# Patient Record
Sex: FEMALE | Race: WHITE | NOT HISPANIC OR LATINO | Employment: UNEMPLOYED | URBAN - METROPOLITAN AREA
[De-identification: names, ages, dates, MRNs, and addresses within clinical notes are randomized per-mention and may not be internally consistent; named-entity substitution may affect disease eponyms.]

---

## 2018-03-15 ENCOUNTER — HOSPITAL ENCOUNTER (OUTPATIENT)
Dept: MAMMOGRAPHY | Facility: HOSPITAL | Age: 58
Discharge: HOME | End: 2018-03-15
Attending: OBSTETRICS & GYNECOLOGY | Admitting: OBSTETRICS & GYNECOLOGY

## 2018-12-04 ENCOUNTER — HOSPITAL ENCOUNTER (OUTPATIENT)
Dept: LAB | Facility: HOSPITAL | Age: 58
Discharge: HOME | End: 2018-12-04
Attending: OTOLARYNGOLOGY | Admitting: OTOLARYNGOLOGY

## 2018-12-06 LAB — Lab: NORMAL

## 2019-08-11 RX ORDER — CLONAZEPAM 1 MG/1
TABLET ORAL
Qty: 90 TABLET | Refills: 0 | OUTPATIENT
Start: 2019-08-11

## 2019-08-17 RX ORDER — CLONAZEPAM 1 MG/1
TABLET ORAL
Qty: 90 TABLET | Refills: 0 | Status: SHIPPED | OUTPATIENT
Start: 2019-08-17 | End: 2019-09-23 | Stop reason: ALTCHOICE

## 2019-09-06 ENCOUNTER — TELEPHONE (OUTPATIENT)
Dept: PSYCHIATRY | Facility: CLINIC | Age: 59
End: 2019-09-06

## 2019-09-06 RX ORDER — HYDROXYZINE PAMOATE 25 MG/1
25 CAPSULE ORAL 3 TIMES DAILY PRN
Qty: 90 CAPSULE | Refills: 0 | Status: SHIPPED | OUTPATIENT
Start: 2019-09-06

## 2019-09-12 ENCOUNTER — TELEPHONE (OUTPATIENT)
Dept: PSYCHIATRY | Facility: CLINIC | Age: 59
End: 2019-09-12

## 2019-09-12 DIAGNOSIS — F41.1 GENERALIZED ANXIETY DISORDER: Primary | ICD-10-CM

## 2019-09-17 ENCOUNTER — TELEPHONE (OUTPATIENT)
Dept: PSYCHIATRY | Facility: CLINIC | Age: 59
End: 2019-09-17

## 2019-09-17 RX ORDER — BLOOD-GLUCOSE METER
KIT MISCELLANEOUS
Refills: 7 | COMMUNITY
Start: 2019-09-05

## 2019-09-17 RX ORDER — ATORVASTATIN CALCIUM 80 MG/1
TABLET, FILM COATED ORAL
Refills: 4 | COMMUNITY
Start: 2019-07-02

## 2019-09-17 RX ORDER — METFORMIN HYDROCHLORIDE 500 MG/1
TABLET, EXTENDED RELEASE ORAL
Refills: 4 | COMMUNITY
Start: 2019-07-29

## 2019-09-17 RX ORDER — AZELASTINE 1 MG/ML
SPRAY, METERED NASAL
Refills: 6 | COMMUNITY
Start: 2019-08-31

## 2019-09-17 RX ORDER — GLIPIZIDE 10 MG/1
10 TABLET ORAL 2 TIMES DAILY
Refills: 3 | COMMUNITY
Start: 2019-07-11

## 2019-09-17 RX ORDER — FENOFIBRATE 48 MG/1
TABLET, COATED ORAL
Refills: 2 | COMMUNITY
Start: 2019-07-01

## 2019-09-17 RX ORDER — CETIRIZINE HYDROCHLORIDE 10 MG/1
TABLET ORAL
Refills: 6 | COMMUNITY
Start: 2019-09-05

## 2019-09-17 RX ORDER — PANTOPRAZOLE SODIUM 40 MG/1
TABLET, DELAYED RELEASE ORAL
Refills: 4 | COMMUNITY
Start: 2019-07-01

## 2019-09-17 RX ORDER — MONTELUKAST SODIUM 10 MG/1
10 TABLET ORAL DAILY
Refills: 11 | COMMUNITY
Start: 2019-07-03

## 2019-09-17 RX ORDER — DOCUSATE SODIUM 100 MG/1
CAPSULE, LIQUID FILLED ORAL
Refills: 2 | COMMUNITY
Start: 2019-07-01

## 2019-09-17 RX ORDER — FLUTICASONE PROPIONATE 50 MCG
SPRAY, SUSPENSION (ML) NASAL
Refills: 6 | COMMUNITY
Start: 2019-08-01

## 2019-09-17 RX ORDER — DICYCLOMINE HYDROCHLORIDE 10 MG/1
CAPSULE ORAL
Refills: 2 | COMMUNITY
Start: 2019-08-28

## 2019-09-23 RX ORDER — ALPRAZOLAM 1 MG/1
1 TABLET ORAL 3 TIMES DAILY PRN
Qty: 21 TABLET | Refills: 0 | Status: SHIPPED | OUTPATIENT
Start: 2019-09-23 | End: 2019-09-26 | Stop reason: SDUPTHER

## 2019-09-24 ENCOUNTER — OFFICE VISIT (OUTPATIENT)
Dept: PSYCHIATRY | Facility: CLINIC | Age: 59
End: 2019-09-24

## 2019-09-24 DIAGNOSIS — F51.05 INSOMNIA DUE TO MENTAL DISORDER: ICD-10-CM

## 2019-09-24 DIAGNOSIS — F41.1 GENERALIZED ANXIETY DISORDER: ICD-10-CM

## 2019-09-24 DIAGNOSIS — F31.60 BIPOLAR AFFECTIVE DISORDER, MIXED (HCC): Primary | ICD-10-CM

## 2019-09-24 PROCEDURE — 99213 OFFICE O/P EST LOW 20 MIN: CPT | Performed by: PHYSICIAN ASSISTANT

## 2019-09-24 RX ORDER — PAROXETINE HYDROCHLORIDE HEMIHYDRATE 37.5 MG/1
37.5 TABLET, FILM COATED, EXTENDED RELEASE ORAL EVERY MORNING
Qty: 90 TABLET | Refills: 1 | Status: SHIPPED | OUTPATIENT
Start: 2019-09-24 | End: 2020-01-27

## 2019-09-24 RX ORDER — AMITRIPTYLINE HYDROCHLORIDE 25 MG/1
25 TABLET, FILM COATED ORAL NIGHTLY
Qty: 90 TABLET | Refills: 1 | Status: SHIPPED | OUTPATIENT
Start: 2019-09-24 | End: 2020-03-19 | Stop reason: SDUPTHER

## 2019-09-24 RX ORDER — ZIPRASIDONE HYDROCHLORIDE 60 MG/1
CAPSULE ORAL
Qty: 90 CAPSULE | Refills: 1 | Status: SHIPPED | OUTPATIENT
Start: 2019-09-24 | End: 2020-03-19 | Stop reason: SDUPTHER

## 2019-09-26 ENCOUNTER — TELEPHONE (OUTPATIENT)
Dept: PSYCHIATRY | Facility: CLINIC | Age: 59
End: 2019-09-26

## 2019-09-26 DIAGNOSIS — F41.1 GENERALIZED ANXIETY DISORDER: ICD-10-CM

## 2019-09-27 RX ORDER — ALPRAZOLAM 1 MG/1
TABLET ORAL
Qty: 90 TABLET | Refills: 2 | Status: SHIPPED | OUTPATIENT
Start: 2019-09-27 | End: 2019-12-16 | Stop reason: SDUPTHER

## 2019-12-16 DIAGNOSIS — F41.1 GENERALIZED ANXIETY DISORDER: ICD-10-CM

## 2019-12-16 RX ORDER — ALPRAZOLAM 1 MG/1
TABLET ORAL
Qty: 90 TABLET | Refills: 2 | Status: SHIPPED | OUTPATIENT
Start: 2019-12-16 | End: 2020-03-10

## 2020-01-27 DIAGNOSIS — F41.1 GENERALIZED ANXIETY DISORDER: ICD-10-CM

## 2020-01-27 DIAGNOSIS — F31.60 BIPOLAR AFFECTIVE DISORDER, MIXED (HCC): ICD-10-CM

## 2020-01-27 RX ORDER — PAROXETINE HYDROCHLORIDE HEMIHYDRATE 37.5 MG/1
37.5 TABLET, FILM COATED, EXTENDED RELEASE ORAL EVERY MORNING
Qty: 90 TABLET | Refills: 0 | Status: SHIPPED | OUTPATIENT
Start: 2020-01-27 | End: 2020-03-19 | Stop reason: SDUPTHER

## 2020-03-10 DIAGNOSIS — F41.1 GENERALIZED ANXIETY DISORDER: ICD-10-CM

## 2020-03-10 RX ORDER — ALPRAZOLAM 1 MG/1
TABLET ORAL
Qty: 90 TABLET | Refills: 0 | Status: SHIPPED | OUTPATIENT
Start: 2020-03-10 | End: 2020-03-19 | Stop reason: SDUPTHER

## 2020-03-19 ENCOUNTER — OFFICE VISIT (OUTPATIENT)
Dept: PSYCHIATRY | Facility: CLINIC | Age: 60
End: 2020-03-19

## 2020-03-19 DIAGNOSIS — F31.60 BIPOLAR AFFECTIVE DISORDER, MIXED (HCC): Primary | ICD-10-CM

## 2020-03-19 DIAGNOSIS — F51.05 INSOMNIA DUE TO MENTAL DISORDER: ICD-10-CM

## 2020-03-19 DIAGNOSIS — F41.1 GENERALIZED ANXIETY DISORDER: ICD-10-CM

## 2020-03-19 PROCEDURE — 99213 OFFICE O/P EST LOW 20 MIN: CPT | Performed by: PHYSICIAN ASSISTANT

## 2020-03-19 RX ORDER — PAROXETINE HYDROCHLORIDE HEMIHYDRATE 37.5 MG/1
37.5 TABLET, FILM COATED, EXTENDED RELEASE ORAL EVERY MORNING
Qty: 90 TABLET | Refills: 1 | Status: SHIPPED | OUTPATIENT
Start: 2020-03-19 | End: 2020-10-05

## 2020-03-19 RX ORDER — AMITRIPTYLINE HYDROCHLORIDE 25 MG/1
25 TABLET, FILM COATED ORAL NIGHTLY
Qty: 90 TABLET | Refills: 1 | Status: SHIPPED | OUTPATIENT
Start: 2020-03-19 | End: 2020-08-31

## 2020-03-19 RX ORDER — ZIPRASIDONE HYDROCHLORIDE 60 MG/1
CAPSULE ORAL
Qty: 90 CAPSULE | Refills: 1 | Status: SHIPPED | OUTPATIENT
Start: 2020-03-19 | End: 2020-08-03

## 2020-03-19 RX ORDER — ALPRAZOLAM 1 MG/1
1 TABLET ORAL 3 TIMES DAILY PRN
Qty: 90 TABLET | Refills: 2 | Status: SHIPPED | OUTPATIENT
Start: 2020-03-19 | End: 2020-06-25

## 2020-03-21 DIAGNOSIS — F31.60 BIPOLAR AFFECTIVE DISORDER, MIXED (HCC): ICD-10-CM

## 2020-03-21 RX ORDER — ZIPRASIDONE HYDROCHLORIDE 60 MG/1
CAPSULE ORAL
Qty: 90 CAPSULE | Refills: 1 | OUTPATIENT
Start: 2020-03-21

## 2020-03-23 DIAGNOSIS — F51.05 INSOMNIA DUE TO MENTAL DISORDER: ICD-10-CM

## 2020-03-23 RX ORDER — AMITRIPTYLINE HYDROCHLORIDE 25 MG/1
25 TABLET, FILM COATED ORAL NIGHTLY
Qty: 90 TABLET | Refills: 1 | OUTPATIENT
Start: 2020-03-23

## 2020-06-01 ENCOUNTER — TELEPHONE (OUTPATIENT)
Dept: PSYCHIATRY | Facility: CLINIC | Age: 60
End: 2020-06-01

## 2020-06-25 DIAGNOSIS — F41.1 GENERALIZED ANXIETY DISORDER: ICD-10-CM

## 2020-06-25 RX ORDER — ALPRAZOLAM 1 MG/1
1 TABLET ORAL 3 TIMES DAILY PRN
Qty: 90 TABLET | Refills: 2 | Status: SHIPPED | OUTPATIENT
Start: 2020-06-25 | End: 2020-09-22

## 2020-08-01 DIAGNOSIS — F31.60 BIPOLAR AFFECTIVE DISORDER, MIXED (HCC): ICD-10-CM

## 2020-08-03 RX ORDER — ZIPRASIDONE HYDROCHLORIDE 60 MG/1
CAPSULE ORAL
Qty: 90 CAPSULE | Refills: 1 | Status: SHIPPED | OUTPATIENT
Start: 2020-08-03 | End: 2020-09-24 | Stop reason: ALTCHOICE

## 2020-08-31 DIAGNOSIS — F51.05 INSOMNIA DUE TO MENTAL DISORDER: ICD-10-CM

## 2020-08-31 RX ORDER — AMITRIPTYLINE HYDROCHLORIDE 25 MG/1
TABLET, FILM COATED ORAL
Qty: 90 TABLET | Refills: 0 | Status: SHIPPED | OUTPATIENT
Start: 2020-08-31 | End: 2021-01-02

## 2020-09-03 ENCOUNTER — TELEPHONE (OUTPATIENT)
Dept: PSYCHIATRY | Facility: CLINIC | Age: 60
End: 2020-09-03

## 2020-09-05 DIAGNOSIS — F41.1 GENERALIZED ANXIETY DISORDER: ICD-10-CM

## 2020-09-10 RX ORDER — ALPRAZOLAM 1 MG/1
1 TABLET ORAL 3 TIMES DAILY PRN
Qty: 90 TABLET | Refills: 2 | OUTPATIENT
Start: 2020-09-10

## 2020-09-14 ENCOUNTER — TELEPHONE (OUTPATIENT)
Dept: PSYCHIATRY | Facility: CLINIC | Age: 60
End: 2020-09-14

## 2020-09-22 DIAGNOSIS — F41.1 GENERALIZED ANXIETY DISORDER: ICD-10-CM

## 2020-09-22 RX ORDER — ALPRAZOLAM 1 MG/1
1 TABLET ORAL 3 TIMES DAILY PRN
Qty: 90 TABLET | Refills: 0 | Status: SHIPPED | OUTPATIENT
Start: 2020-09-22 | End: 2020-10-27 | Stop reason: SDUPTHER

## 2020-09-24 ENCOUNTER — TELEPHONE (OUTPATIENT)
Dept: PSYCHIATRY | Facility: CLINIC | Age: 60
End: 2020-09-24

## 2020-09-24 DIAGNOSIS — F31.60 BIPOLAR AFFECTIVE DISORDER, MIXED (HCC): Primary | ICD-10-CM

## 2020-10-04 DIAGNOSIS — F41.1 GENERALIZED ANXIETY DISORDER: ICD-10-CM

## 2020-10-04 DIAGNOSIS — F31.60 BIPOLAR AFFECTIVE DISORDER, MIXED (HCC): ICD-10-CM

## 2020-10-05 RX ORDER — PAROXETINE HYDROCHLORIDE HEMIHYDRATE 37.5 MG/1
TABLET, FILM COATED, EXTENDED RELEASE ORAL
Qty: 90 TABLET | Refills: 0 | Status: SHIPPED | OUTPATIENT
Start: 2020-10-05 | End: 2020-12-31

## 2020-10-27 ENCOUNTER — OFFICE VISIT (OUTPATIENT)
Dept: PSYCHIATRY | Facility: CLINIC | Age: 60
End: 2020-10-27

## 2020-10-27 DIAGNOSIS — F41.1 GENERALIZED ANXIETY DISORDER: ICD-10-CM

## 2020-10-27 DIAGNOSIS — F51.05 INSOMNIA DUE TO MENTAL DISORDER: ICD-10-CM

## 2020-10-27 DIAGNOSIS — F31.60 BIPOLAR AFFECTIVE DISORDER, MIXED (HCC): Primary | ICD-10-CM

## 2020-10-27 PROCEDURE — 99442 PR PHYS/QHP TELEPHONE EVALUATION 11-20 MIN: CPT | Performed by: PHYSICIAN ASSISTANT

## 2020-10-27 RX ORDER — ALPRAZOLAM 1 MG/1
1 TABLET ORAL 3 TIMES DAILY PRN
Qty: 90 TABLET | Refills: 2 | Status: SHIPPED | OUTPATIENT
Start: 2020-10-27 | End: 2021-01-09

## 2020-12-05 DIAGNOSIS — F31.60 BIPOLAR AFFECTIVE DISORDER, MIXED (HCC): ICD-10-CM

## 2020-12-07 RX ORDER — CARIPRAZINE 1.5 MG/1
CAPSULE, GELATIN COATED ORAL
Qty: 30 CAPSULE | Refills: 5 | Status: SHIPPED | OUTPATIENT
Start: 2020-12-07 | End: 2021-01-27 | Stop reason: DRUGHIGH

## 2020-12-31 DIAGNOSIS — F31.60 BIPOLAR AFFECTIVE DISORDER, MIXED (HCC): ICD-10-CM

## 2020-12-31 DIAGNOSIS — F41.1 GENERALIZED ANXIETY DISORDER: ICD-10-CM

## 2020-12-31 RX ORDER — PAROXETINE HYDROCHLORIDE HEMIHYDRATE 37.5 MG/1
TABLET, FILM COATED, EXTENDED RELEASE ORAL
Qty: 90 TABLET | Refills: 1 | Status: SHIPPED | OUTPATIENT
Start: 2020-12-31 | End: 2021-01-27

## 2021-01-02 DIAGNOSIS — F51.05 INSOMNIA DUE TO MENTAL DISORDER: ICD-10-CM

## 2021-01-02 RX ORDER — AMITRIPTYLINE HYDROCHLORIDE 25 MG/1
TABLET, FILM COATED ORAL
Qty: 90 TABLET | Refills: 1 | Status: SHIPPED | OUTPATIENT
Start: 2021-01-02 | End: 2021-04-26

## 2021-01-07 DIAGNOSIS — F41.1 GENERALIZED ANXIETY DISORDER: ICD-10-CM

## 2021-01-09 RX ORDER — ALPRAZOLAM 1 MG/1
TABLET ORAL
Qty: 90 TABLET | Refills: 2 | Status: SHIPPED | OUTPATIENT
Start: 2021-01-09 | End: 2021-04-26 | Stop reason: SDUPTHER

## 2021-01-13 ENCOUNTER — TELEPHONE (OUTPATIENT)
Dept: PSYCHIATRY | Facility: CLINIC | Age: 61
End: 2021-01-13

## 2021-01-18 ENCOUNTER — TELEPHONE (OUTPATIENT)
Dept: PSYCHIATRY | Facility: CLINIC | Age: 61
End: 2021-01-18

## 2021-01-26 ENCOUNTER — TELEPHONE (OUTPATIENT)
Dept: PSYCHIATRY | Facility: CLINIC | Age: 61
End: 2021-01-26

## 2021-01-26 DIAGNOSIS — F31.60 BIPOLAR AFFECTIVE DISORDER, MIXED (HCC): Primary | ICD-10-CM

## 2021-04-22 ENCOUNTER — TELEPHONE (OUTPATIENT)
Dept: PSYCHIATRY | Facility: CLINIC | Age: 61
End: 2021-04-22

## 2021-04-22 DIAGNOSIS — F41.1 GENERALIZED ANXIETY DISORDER: ICD-10-CM

## 2021-04-26 DIAGNOSIS — F51.05 INSOMNIA DUE TO MENTAL DISORDER: ICD-10-CM

## 2021-04-26 RX ORDER — AMITRIPTYLINE HYDROCHLORIDE 25 MG/1
TABLET, FILM COATED ORAL
Qty: 90 TABLET | Refills: 1 | Status: SHIPPED | OUTPATIENT
Start: 2021-04-26

## 2021-04-26 RX ORDER — ALPRAZOLAM 1 MG/1
1 TABLET ORAL 3 TIMES DAILY PRN
Qty: 90 TABLET | Refills: 0 | Status: SHIPPED | OUTPATIENT
Start: 2021-04-26

## 2021-05-11 ENCOUNTER — TELEPHONE (OUTPATIENT)
Dept: PSYCHIATRY | Facility: CLINIC | Age: 61
End: 2021-05-11

## 2025-06-10 ENCOUNTER — HOSPITAL ENCOUNTER (INPATIENT)
Facility: HOSPITAL | Age: 65
LOS: 1 days | Discharge: NURSING, REHAB OR LONG-TERM CARE | DRG: 683 | End: 2025-06-12
Attending: EMERGENCY MEDICINE | Admitting: INTERNAL MEDICINE
Payer: MEDICARE

## 2025-06-10 DIAGNOSIS — E83.42 HYPOMAGNESEMIA: ICD-10-CM

## 2025-06-10 DIAGNOSIS — R53.1 WEAKNESS: Primary | ICD-10-CM

## 2025-06-10 DIAGNOSIS — E86.0 DEHYDRATION: ICD-10-CM

## 2025-06-10 LAB
ALBUMIN SERPL-MCNC: 3.8 G/DL (ref 3.5–5.2)
ALBUMIN/GLOB SERPL: 1.9 G/DL
ALP SERPL-CCNC: 84 U/L (ref 39–117)
ALT SERPL W P-5'-P-CCNC: 13 U/L (ref 1–33)
ANION GAP SERPL CALCULATED.3IONS-SCNC: 13.2 MMOL/L (ref 5–15)
AST SERPL-CCNC: 14 U/L (ref 1–32)
BACTERIA UR QL AUTO: NORMAL /HPF
BASOPHILS # BLD AUTO: 0.03 10*3/MM3 (ref 0–0.2)
BASOPHILS NFR BLD AUTO: 0.4 % (ref 0–1.5)
BILIRUB SERPL-MCNC: 0.2 MG/DL (ref 0–1.2)
BILIRUB UR QL STRIP: NEGATIVE
BUN SERPL-MCNC: 21.5 MG/DL (ref 8–23)
BUN/CREAT SERPL: 15.9 (ref 7–25)
CALCIUM SPEC-SCNC: 9.7 MG/DL (ref 8.6–10.5)
CHLORIDE SERPL-SCNC: 95 MMOL/L (ref 98–107)
CLARITY UR: CLEAR
CO2 SERPL-SCNC: 24.8 MMOL/L (ref 22–29)
COLOR UR: YELLOW
CREAT SERPL-MCNC: 1.35 MG/DL (ref 0.57–1)
DEPRECATED RDW RBC AUTO: 50.1 FL (ref 37–54)
EGFRCR SERPLBLD CKD-EPI 2021: 43.2 ML/MIN/1.73
EOSINOPHIL # BLD AUTO: 0.36 10*3/MM3 (ref 0–0.4)
EOSINOPHIL NFR BLD AUTO: 5.2 % (ref 0.3–6.2)
ERYTHROCYTE [DISTWIDTH] IN BLOOD BY AUTOMATED COUNT: 15.8 % (ref 12.3–15.4)
GLOBULIN UR ELPH-MCNC: 2 GM/DL
GLUCOSE BLDC GLUCOMTR-MCNC: 209 MG/DL (ref 70–105)
GLUCOSE SERPL-MCNC: 183 MG/DL (ref 65–99)
GLUCOSE UR STRIP-MCNC: NEGATIVE MG/DL
GRAN CASTS URNS QL MICRO: NORMAL /LPF
HCT VFR BLD AUTO: 28.5 % (ref 34–46.6)
HGB BLD-MCNC: 9.1 G/DL (ref 12–15.9)
HGB UR QL STRIP.AUTO: NEGATIVE
HYALINE CASTS UR QL AUTO: NORMAL /LPF
IMM GRANULOCYTES # BLD AUTO: 0.03 10*3/MM3 (ref 0–0.05)
IMM GRANULOCYTES NFR BLD AUTO: 0.4 % (ref 0–0.5)
KETONES UR QL STRIP: ABNORMAL
LEUKOCYTE ESTERASE UR QL STRIP.AUTO: ABNORMAL
LYMPHOCYTES # BLD AUTO: 1.6 10*3/MM3 (ref 0.7–3.1)
LYMPHOCYTES NFR BLD AUTO: 23.3 % (ref 19.6–45.3)
MAGNESIUM SERPL-MCNC: 1.3 MG/DL (ref 1.6–2.4)
MCH RBC QN AUTO: 27.7 PG (ref 26.6–33)
MCHC RBC AUTO-ENTMCNC: 31.9 G/DL (ref 31.5–35.7)
MCV RBC AUTO: 86.6 FL (ref 79–97)
MONOCYTES # BLD AUTO: 0.6 10*3/MM3 (ref 0.1–0.9)
MONOCYTES NFR BLD AUTO: 8.7 % (ref 5–12)
NEUTROPHILS NFR BLD AUTO: 4.24 10*3/MM3 (ref 1.7–7)
NEUTROPHILS NFR BLD AUTO: 62 % (ref 42.7–76)
NITRITE UR QL STRIP: NEGATIVE
NRBC BLD AUTO-RTO: 0 /100 WBC (ref 0–0.2)
NT-PROBNP SERPL-MCNC: 567 PG/ML (ref 0–900)
PH UR STRIP.AUTO: 5.5 [PH] (ref 5–8)
PLATELET # BLD AUTO: 388 10*3/MM3 (ref 140–450)
PMV BLD AUTO: 8.9 FL (ref 6–12)
POTASSIUM SERPL-SCNC: 4.6 MMOL/L (ref 3.5–5.2)
PROT SERPL-MCNC: 5.8 G/DL (ref 6–8.5)
PROT UR QL STRIP: ABNORMAL
RBC # BLD AUTO: 3.29 10*6/MM3 (ref 3.77–5.28)
RBC # UR STRIP: NORMAL /HPF
REF LAB TEST METHOD: NORMAL
SODIUM SERPL-SCNC: 133 MMOL/L (ref 136–145)
SP GR UR STRIP: 1.01 (ref 1–1.03)
SQUAMOUS #/AREA URNS HPF: NORMAL /HPF
TSH SERPL DL<=0.05 MIU/L-ACNC: 1.26 UIU/ML (ref 0.27–4.2)
UROBILINOGEN UR QL STRIP: ABNORMAL
WBC # UR STRIP: NORMAL /HPF
WBC NRBC COR # BLD AUTO: 6.86 10*3/MM3 (ref 3.4–10.8)

## 2025-06-10 PROCEDURE — 25810000003 SODIUM CHLORIDE 0.9 % SOLUTION

## 2025-06-10 PROCEDURE — 84443 ASSAY THYROID STIM HORMONE: CPT | Performed by: EMERGENCY MEDICINE

## 2025-06-10 PROCEDURE — 99285 EMERGENCY DEPT VISIT HI MDM: CPT

## 2025-06-10 PROCEDURE — G0378 HOSPITAL OBSERVATION PER HR: HCPCS

## 2025-06-10 PROCEDURE — P9612 CATHETERIZE FOR URINE SPEC: HCPCS

## 2025-06-10 PROCEDURE — 81001 URINALYSIS AUTO W/SCOPE: CPT | Performed by: EMERGENCY MEDICINE

## 2025-06-10 PROCEDURE — 94640 AIRWAY INHALATION TREATMENT: CPT

## 2025-06-10 PROCEDURE — 85025 COMPLETE CBC W/AUTO DIFF WBC: CPT | Performed by: EMERGENCY MEDICINE

## 2025-06-10 PROCEDURE — 93005 ELECTROCARDIOGRAM TRACING: CPT | Performed by: EMERGENCY MEDICINE

## 2025-06-10 PROCEDURE — 63710000001 INSULIN LISPRO (HUMAN) PER 5 UNITS: Performed by: NURSE PRACTITIONER

## 2025-06-10 PROCEDURE — 82948 REAGENT STRIP/BLOOD GLUCOSE: CPT | Performed by: NURSE PRACTITIONER

## 2025-06-10 PROCEDURE — 94799 UNLISTED PULMONARY SVC/PX: CPT

## 2025-06-10 PROCEDURE — 25010000002 MAGNESIUM SULFATE 2 GM/50ML SOLUTION: Performed by: STUDENT IN AN ORGANIZED HEALTH CARE EDUCATION/TRAINING PROGRAM

## 2025-06-10 PROCEDURE — 83880 ASSAY OF NATRIURETIC PEPTIDE: CPT | Performed by: NURSE PRACTITIONER

## 2025-06-10 PROCEDURE — 80053 COMPREHEN METABOLIC PANEL: CPT | Performed by: EMERGENCY MEDICINE

## 2025-06-10 PROCEDURE — 36415 COLL VENOUS BLD VENIPUNCTURE: CPT

## 2025-06-10 PROCEDURE — 25810000003 LACTATED RINGERS SOLUTION: Performed by: EMERGENCY MEDICINE

## 2025-06-10 PROCEDURE — 83735 ASSAY OF MAGNESIUM: CPT | Performed by: EMERGENCY MEDICINE

## 2025-06-10 RX ORDER — ONDANSETRON 4 MG/1
4 TABLET, ORALLY DISINTEGRATING ORAL EVERY 6 HOURS PRN
Status: DISCONTINUED | OUTPATIENT
Start: 2025-06-10 | End: 2025-06-12 | Stop reason: HOSPADM

## 2025-06-10 RX ORDER — MAGNESIUM SULFATE HEPTAHYDRATE 40 MG/ML
2 INJECTION, SOLUTION INTRAVENOUS
Status: COMPLETED | OUTPATIENT
Start: 2025-06-10 | End: 2025-06-11

## 2025-06-10 RX ORDER — ALPRAZOLAM 2 MG/1
2 TABLET ORAL 3 TIMES DAILY PRN
COMMUNITY

## 2025-06-10 RX ORDER — ACETAMINOPHEN 160 MG/5ML
650 SOLUTION ORAL EVERY 4 HOURS PRN
Status: DISCONTINUED | OUTPATIENT
Start: 2025-06-10 | End: 2025-06-12 | Stop reason: HOSPADM

## 2025-06-10 RX ORDER — LETROZOLE 2.5 MG/1
2.5 TABLET, FILM COATED ORAL DAILY
Status: DISCONTINUED | OUTPATIENT
Start: 2025-06-11 | End: 2025-06-12 | Stop reason: HOSPADM

## 2025-06-10 RX ORDER — SODIUM CHLORIDE 0.9 % (FLUSH) 0.9 %
10 SYRINGE (ML) INJECTION AS NEEDED
Status: DISCONTINUED | OUTPATIENT
Start: 2025-06-10 | End: 2025-06-12 | Stop reason: HOSPADM

## 2025-06-10 RX ORDER — DOCUSATE SODIUM 100 MG/1
100 CAPSULE, LIQUID FILLED ORAL 2 TIMES DAILY PRN
COMMUNITY

## 2025-06-10 RX ORDER — POLYETHYLENE GLYCOL 3350 17 G/17G
17 POWDER, FOR SOLUTION ORAL DAILY PRN
Status: DISCONTINUED | OUTPATIENT
Start: 2025-06-10 | End: 2025-06-12 | Stop reason: HOSPADM

## 2025-06-10 RX ORDER — DEXTROSE MONOHYDRATE 25 G/50ML
25 INJECTION, SOLUTION INTRAVENOUS
Status: DISCONTINUED | OUTPATIENT
Start: 2025-06-10 | End: 2025-06-12 | Stop reason: HOSPADM

## 2025-06-10 RX ORDER — LAMOTRIGINE 100 MG/1
100 TABLET ORAL NIGHTLY
Status: DISCONTINUED | OUTPATIENT
Start: 2025-06-10 | End: 2025-06-12 | Stop reason: HOSPADM

## 2025-06-10 RX ORDER — BISACODYL 10 MG
10 SUPPOSITORY, RECTAL RECTAL DAILY PRN
Status: DISCONTINUED | OUTPATIENT
Start: 2025-06-10 | End: 2025-06-12 | Stop reason: HOSPADM

## 2025-06-10 RX ORDER — LETROZOLE 2.5 MG/1
2.5 TABLET, FILM COATED ORAL DAILY
COMMUNITY

## 2025-06-10 RX ORDER — NITROGLYCERIN 0.4 MG/1
0.4 TABLET SUBLINGUAL
Status: DISCONTINUED | OUTPATIENT
Start: 2025-06-10 | End: 2025-06-12 | Stop reason: HOSPADM

## 2025-06-10 RX ORDER — IBUPROFEN 600 MG/1
1 TABLET ORAL
Status: DISCONTINUED | OUTPATIENT
Start: 2025-06-10 | End: 2025-06-12 | Stop reason: HOSPADM

## 2025-06-10 RX ORDER — ATORVASTATIN CALCIUM 40 MG/1
80 TABLET, FILM COATED ORAL DAILY
Status: DISCONTINUED | OUTPATIENT
Start: 2025-06-10 | End: 2025-06-12 | Stop reason: HOSPADM

## 2025-06-10 RX ORDER — ONDANSETRON 2 MG/ML
4 INJECTION INTRAMUSCULAR; INTRAVENOUS EVERY 6 HOURS PRN
Status: DISCONTINUED | OUTPATIENT
Start: 2025-06-10 | End: 2025-06-12 | Stop reason: HOSPADM

## 2025-06-10 RX ORDER — DULOXETIN HYDROCHLORIDE 60 MG/1
60 CAPSULE, DELAYED RELEASE ORAL NIGHTLY
COMMUNITY

## 2025-06-10 RX ORDER — SODIUM CHLORIDE 9 MG/ML
40 INJECTION, SOLUTION INTRAVENOUS AS NEEDED
Status: DISCONTINUED | OUTPATIENT
Start: 2025-06-10 | End: 2025-06-12 | Stop reason: HOSPADM

## 2025-06-10 RX ORDER — PANTOPRAZOLE SODIUM 40 MG/1
40 TABLET, DELAYED RELEASE ORAL DAILY
Status: DISCONTINUED | OUTPATIENT
Start: 2025-06-11 | End: 2025-06-12 | Stop reason: HOSPADM

## 2025-06-10 RX ORDER — ALBUTEROL SULFATE 0.83 MG/ML
2.5 SOLUTION RESPIRATORY (INHALATION) EVERY 4 HOURS PRN
Status: DISCONTINUED | OUTPATIENT
Start: 2025-06-10 | End: 2025-06-12 | Stop reason: HOSPADM

## 2025-06-10 RX ORDER — OLANZAPINE 10 MG/1
10 TABLET, FILM COATED ORAL NIGHTLY
Status: DISCONTINUED | OUTPATIENT
Start: 2025-06-10 | End: 2025-06-12 | Stop reason: HOSPADM

## 2025-06-10 RX ORDER — HYDRALAZINE HYDROCHLORIDE 20 MG/ML
10 INJECTION INTRAMUSCULAR; INTRAVENOUS EVERY 6 HOURS PRN
Status: DISPENSED | OUTPATIENT
Start: 2025-06-10 | End: 2025-06-11

## 2025-06-10 RX ORDER — DULOXETIN HYDROCHLORIDE 30 MG/1
60 CAPSULE, DELAYED RELEASE ORAL NIGHTLY
Status: DISCONTINUED | OUTPATIENT
Start: 2025-06-10 | End: 2025-06-12

## 2025-06-10 RX ORDER — BUDESONIDE AND FORMOTEROL FUMARATE DIHYDRATE 160; 4.5 UG/1; UG/1
2 AEROSOL RESPIRATORY (INHALATION)
Status: DISCONTINUED | OUTPATIENT
Start: 2025-06-10 | End: 2025-06-12 | Stop reason: HOSPADM

## 2025-06-10 RX ORDER — ASPIRIN 81 MG/1
81 TABLET ORAL DAILY
Status: DISCONTINUED | OUTPATIENT
Start: 2025-06-10 | End: 2025-06-12 | Stop reason: HOSPADM

## 2025-06-10 RX ORDER — LAMOTRIGINE 100 MG/1
100 TABLET ORAL NIGHTLY
COMMUNITY

## 2025-06-10 RX ORDER — AMOXICILLIN 250 MG
2 CAPSULE ORAL 2 TIMES DAILY PRN
Status: DISCONTINUED | OUTPATIENT
Start: 2025-06-10 | End: 2025-06-12 | Stop reason: HOSPADM

## 2025-06-10 RX ORDER — BISACODYL 5 MG/1
5 TABLET, DELAYED RELEASE ORAL DAILY PRN
Status: DISCONTINUED | OUTPATIENT
Start: 2025-06-10 | End: 2025-06-12 | Stop reason: HOSPADM

## 2025-06-10 RX ORDER — ALPRAZOLAM 1 MG/1
2 TABLET ORAL 3 TIMES DAILY PRN
Status: DISCONTINUED | OUTPATIENT
Start: 2025-06-10 | End: 2025-06-12 | Stop reason: HOSPADM

## 2025-06-10 RX ORDER — ACETAMINOPHEN 325 MG/1
650 TABLET ORAL EVERY 4 HOURS PRN
Status: DISCONTINUED | OUTPATIENT
Start: 2025-06-10 | End: 2025-06-12 | Stop reason: HOSPADM

## 2025-06-10 RX ORDER — MONTELUKAST SODIUM 10 MG/1
10 TABLET ORAL NIGHTLY
Status: DISCONTINUED | OUTPATIENT
Start: 2025-06-10 | End: 2025-06-12 | Stop reason: HOSPADM

## 2025-06-10 RX ORDER — DOCUSATE SODIUM 100 MG/1
100 CAPSULE, LIQUID FILLED ORAL 2 TIMES DAILY PRN
Status: DISCONTINUED | OUTPATIENT
Start: 2025-06-10 | End: 2025-06-12 | Stop reason: HOSPADM

## 2025-06-10 RX ORDER — CETIRIZINE HYDROCHLORIDE 10 MG/1
10 TABLET ORAL DAILY PRN
Status: DISCONTINUED | OUTPATIENT
Start: 2025-06-10 | End: 2025-06-12 | Stop reason: HOSPADM

## 2025-06-10 RX ORDER — INSULIN LISPRO 100 [IU]/ML
2-7 INJECTION, SOLUTION INTRAVENOUS; SUBCUTANEOUS
Status: DISCONTINUED | OUTPATIENT
Start: 2025-06-10 | End: 2025-06-12 | Stop reason: HOSPADM

## 2025-06-10 RX ORDER — SODIUM CHLORIDE 9 MG/ML
100 INJECTION, SOLUTION INTRAVENOUS CONTINUOUS
Status: DISPENSED | OUTPATIENT
Start: 2025-06-10 | End: 2025-06-11

## 2025-06-10 RX ORDER — SODIUM CHLORIDE 0.9 % (FLUSH) 0.9 %
10 SYRINGE (ML) INJECTION EVERY 12 HOURS SCHEDULED
Status: DISCONTINUED | OUTPATIENT
Start: 2025-06-10 | End: 2025-06-12 | Stop reason: HOSPADM

## 2025-06-10 RX ORDER — ECONAZOLE NITRATE 10 MG/G
1 CREAM TOPICAL DAILY PRN
COMMUNITY

## 2025-06-10 RX ORDER — HYDROCODONE BITARTRATE AND ACETAMINOPHEN 7.5; 325 MG/1; MG/1
1 TABLET ORAL ONCE AS NEEDED
Refills: 0 | Status: COMPLETED | OUTPATIENT
Start: 2025-06-10 | End: 2025-06-10

## 2025-06-10 RX ORDER — OLANZAPINE 10 MG/1
10 TABLET, FILM COATED ORAL NIGHTLY
COMMUNITY

## 2025-06-10 RX ORDER — FLUTICASONE PROPIONATE AND SALMETEROL 250; 50 UG/1; UG/1
1 POWDER RESPIRATORY (INHALATION)
COMMUNITY

## 2025-06-10 RX ORDER — DICYCLOMINE HYDROCHLORIDE 10 MG/1
10 CAPSULE ORAL 4 TIMES DAILY PRN
Status: DISCONTINUED | OUTPATIENT
Start: 2025-06-10 | End: 2025-06-12 | Stop reason: HOSPADM

## 2025-06-10 RX ORDER — ALBUTEROL SULFATE 0.83 MG/ML
2.5 SOLUTION RESPIRATORY (INHALATION) EVERY 4 HOURS PRN
COMMUNITY

## 2025-06-10 RX ORDER — NICOTINE POLACRILEX 4 MG
15 LOZENGE BUCCAL
Status: DISCONTINUED | OUTPATIENT
Start: 2025-06-10 | End: 2025-06-12 | Stop reason: HOSPADM

## 2025-06-10 RX ORDER — FENOFIBRATE 48 MG/1
48 TABLET, FILM COATED ORAL DAILY
Status: DISCONTINUED | OUTPATIENT
Start: 2025-06-11 | End: 2025-06-12 | Stop reason: HOSPADM

## 2025-06-10 RX ORDER — ACETAMINOPHEN 650 MG/1
650 SUPPOSITORY RECTAL EVERY 4 HOURS PRN
Status: DISCONTINUED | OUTPATIENT
Start: 2025-06-10 | End: 2025-06-12 | Stop reason: HOSPADM

## 2025-06-10 RX ORDER — MICONAZOLE NITRATE 20 MG/G
1 CREAM TOPICAL DAILY PRN
Status: DISCONTINUED | OUTPATIENT
Start: 2025-06-10 | End: 2025-06-12 | Stop reason: HOSPADM

## 2025-06-10 RX ORDER — CANDESARTAN 8 MG/1
8 TABLET ORAL DAILY
COMMUNITY

## 2025-06-10 RX ORDER — CALCIUM CARBONATE/VITAMIN D3 600 MG-10
1 TABLET ORAL DAILY
COMMUNITY

## 2025-06-10 RX ORDER — IBUPROFEN 600 MG/1
600 TABLET, FILM COATED ORAL 2 TIMES DAILY
COMMUNITY
End: 2025-06-12 | Stop reason: HOSPADM

## 2025-06-10 RX ORDER — GABAPENTIN 400 MG/1
400 CAPSULE ORAL 3 TIMES DAILY
COMMUNITY

## 2025-06-10 RX ORDER — GABAPENTIN 400 MG/1
400 CAPSULE ORAL 3 TIMES DAILY
Status: DISCONTINUED | OUTPATIENT
Start: 2025-06-10 | End: 2025-06-12 | Stop reason: HOSPADM

## 2025-06-10 RX ADMIN — MAGNESIUM SULFATE HEPTAHYDRATE 2 G: 40 INJECTION, SOLUTION INTRAVENOUS at 23:08

## 2025-06-10 RX ADMIN — MONTELUKAST 10 MG: 10 TABLET, FILM COATED ORAL at 22:07

## 2025-06-10 RX ADMIN — Medication 10 ML: at 21:14

## 2025-06-10 RX ADMIN — LAMOTRIGINE 100 MG: 100 TABLET ORAL at 22:08

## 2025-06-10 RX ADMIN — SODIUM CHLORIDE 100 ML/HR: 900 INJECTION, SOLUTION INTRAVENOUS at 21:32

## 2025-06-10 RX ADMIN — SODIUM CHLORIDE, SODIUM LACTATE, POTASSIUM CHLORIDE, AND CALCIUM CHLORIDE 500 ML: .6; .31; .03; .02 INJECTION, SOLUTION INTRAVENOUS at 17:44

## 2025-06-10 RX ADMIN — OLANZAPINE 10 MG: 10 TABLET, FILM COATED ORAL at 22:07

## 2025-06-10 RX ADMIN — ASPIRIN 81 MG: 81 TABLET, COATED ORAL at 21:14

## 2025-06-10 RX ADMIN — INSULIN LISPRO 3 UNITS: 100 INJECTION, SOLUTION INTRAVENOUS; SUBCUTANEOUS at 22:25

## 2025-06-10 RX ADMIN — MAGNESIUM SULFATE HEPTAHYDRATE 2 G: 40 INJECTION, SOLUTION INTRAVENOUS at 21:14

## 2025-06-10 RX ADMIN — DULOXETINE 60 MG: 30 CAPSULE, DELAYED RELEASE ORAL at 22:07

## 2025-06-10 RX ADMIN — HYDROCODONE BITARTRATE AND ACETAMINOPHEN 1 TABLET: 7.5; 325 TABLET ORAL at 22:07

## 2025-06-10 RX ADMIN — GABAPENTIN 400 MG: 400 CAPSULE ORAL at 22:06

## 2025-06-10 RX ADMIN — BUDESONIDE AND FORMOTEROL FUMARATE DIHYDRATE 2 PUFF: 160; 4.5 AEROSOL RESPIRATORY (INHALATION) at 23:11

## 2025-06-10 NOTE — Clinical Note
Level of Care: Telemetry [5]  Diagnosis: Generalized weakness [374361]  Admitting Physician: TANA CARCAMO [[id number]]  Attending Physician: TANA CARCAMO [[id number]]

## 2025-06-10 NOTE — ED PROVIDER NOTES
Subjective   History of Present Illness  65-year-old female presents from the senior living facility with reports of 2 falls today.  She states she is having difficulty bearing weight secondary to generalized weakness.  She apparently had 2 falls yesterday and seen in outlying facility in the emergency room for workup.  She denies any injury from her falls today.  She reports no headache or chest or abdominal pain or focal numbness or weakness.  States she has has a chronic knee pain for which she is seeing orthopedics and scheduled for upcoming surgery.  She reports no fevers or chills.  Review of Systems    Past Medical History:   Diagnosis Date    Anxiety     Bipolar disorder     Depression        Allergies   Allergen Reactions    Lithium Hives    Venlafaxine Hcl Er Hives       No past surgical history on file.    Family History   Problem Relation Age of Onset    Anxiety disorder Brother        Social History     Socioeconomic History    Marital status:    Tobacco Use    Smoking status: Every Day     Current packs/day: 1.50     Types: Cigarettes    Smokeless tobacco: Never   Substance and Sexual Activity    Alcohol use: Not Currently    Drug use: Never    Sexual activity: Defer       Prior to Admission medications    Medication Sig Start Date End Date Taking? Authorizing Provider   ALPRAZolam (XANAX) 1 MG tablet Take 1 tablet by mouth 3 (Three) Times a Day As Needed for Anxiety. 4/26/21   Monet Woody PA-C   amitriptyline (ELAVIL) 25 MG tablet TAKE 1 TABLET BY MOUTH EVERY DAY AT NIGHT 4/26/21   Monet Woody PA-C   aspirin 81 MG EC tablet ASPIRIN 81 MG TBEC 9/16/16   ProviderDarnell MD   atorvastatin (LIPITOR) 80 MG tablet  7/2/19   ProviderDarnell MD   azelastine (ASTELIN) 0.1 % nasal spray U 2 SPRAYS IEN QD IN THE BARBARA 8/31/19   ProviderDarnell MD   Cariprazine HCl (Vraylar) 3 MG capsule capsule Take 1 capsule by mouth Daily. 1/27/21   Monet Woody PA-C   cetirizine (zyrTEC)  "10 MG tablet  9/5/19   Darnell Knott MD   dicyclomine (BENTYL) 10 MG capsule TK 1 C PO QID PRF SPASMS 8/28/19   Darnell Knott MD    MG capsule TK ONE C PO D FOR CONSTIPATION 7/1/19   Darnell Knott MD   fenofibrate (TRICOR) 48 MG tablet TK 1 T PO D FOR TRIGLYCERIDES 7/1/19   Darnell Knott MD   fluticasone (FLONASE) 50 MCG/ACT nasal spray INHALE 2 PUFFS BY NASAL ROUTE D 8/1/19   Darnell Knott MD   FREESTYLE LITE test strip USE 1 STRIP DAILY AND PRN 9/5/19   Darnell Knott MD   glipiZIDE (GLUCOTROL) 10 MG tablet Take 10 mg by mouth 2 (Two) Times a Day. 7/11/19   Darnell Knott MD   HYDROcodone-acetaminophen (NORCO) 7.5-325 MG per tablet HYDROCODONE-ACETAMINOPHEN 7.5-325 MG TABS 9/16/16   Darnell Knott MD   hydrOXYzine pamoate (VISTARIL) 25 MG capsule Take 1 capsule by mouth 3 (Three) Times a Day As Needed for Anxiety. 9/6/19   Monet Woody PA-C   LOSARTAN POTASSIUM PO LOSARTAN POTASSIUM TABS 9/16/16   Darnell Knott MD   metFORMIN ER (GLUCOPHAGE-XR) 500 MG 24 hr tablet TK 3 TS PO WITH THE BARBARA MEAL 7/29/19   Darnell Knott MD   montelukast (SINGULAIR) 10 MG tablet Take 10 mg by mouth Daily. 7/3/19   Darnell Knott MD   pantoprazole (PROTONIX) 40 MG EC tablet TK 1 T PO D FOR STOMACH ACID 7/1/19   Darnell Knott MD   PROAIR  (90 Base) MCG/ACT inhaler INHALE 2 PUFFS Q 4 TO 6 H PRN 8/2/19   Darnell Knott MD     /58   Pulse 85   Temp 98 °F (36.7 °C)   Resp 18   Ht 162.6 cm (64.02\")   Wt 118 kg (260 lb 8 oz)   SpO2 93%   BMI 44.69 kg/m²       Objective   Physical Exam  General: Obese female awake and alert, no acute distress  Eyes: Pupils round and reactive, sclera nonicteric  HEENT: Mucous membranes somewhat dry, no mucosal swelling, normocephalic, atraumatic  Neck: C-spine nontender, supple, no nuchal rigidity, no JVD  Thoracic and lumbar spine nontender   Respirations: Respirations nonlabored, " equal breath sounds bilaterally, clear lungs  Heart regular rate and rhythm,    Abdomen soft nontender nondistended,   Extremities no clubbing cyanosis or edema, calves are symmetric and nontender, no point bony tenderness in extremities  Neuro cranial nerves grossly intact, no focal limb deficits, generally weak  Psych oriented, pleasant affect  Skin no rash, brisk cap refill  Procedures           ED Course      Results for orders placed or performed during the hospital encounter of 06/10/25   ECG 12 Lead Other; weakness    Collection Time: 06/10/25  5:22 PM   Result Value Ref Range    QT Interval 379 ms    QTC Interval 465 ms   Urinalysis With Culture If Indicated - Straight Cath    Collection Time: 06/10/25  5:38 PM    Specimen: Straight Cath; Urine   Result Value Ref Range    Color, UA Yellow Yellow, Straw    Appearance, UA Clear Clear    pH, UA 5.5 5.0 - 8.0    Specific Gravity, UA 1.015 1.005 - 1.030    Glucose, UA Negative Negative    Ketones, UA Trace (A) Negative    Bilirubin, UA Negative Negative    Blood, UA Negative Negative    Protein, UA 30 mg/dL (1+) (A) Negative    Leuk Esterase, UA Trace (A) Negative    Nitrite, UA Negative Negative    Urobilinogen, UA 1.0 E.U./dL 0.2 - 1.0 E.U./dL   Urinalysis, Microscopic Only - Straight Cath    Collection Time: 06/10/25  5:38 PM    Specimen: Straight Cath; Urine   Result Value Ref Range    RBC, UA None Seen None Seen, 0-2 /HPF    WBC, UA 0-2 None Seen, 0-2 /HPF    Bacteria, UA None Seen None Seen /HPF    Squamous Epithelial Cells, UA 0-2 None Seen, 0-2 /HPF    Hyaline Casts, UA 21-30 None Seen /LPF    Granular Casts, UA 0-2 None Seen /LPF    Methodology Manual Light Microscopy    Comprehensive Metabolic Panel    Collection Time: 06/10/25  5:47 PM    Specimen: Blood   Result Value Ref Range    Glucose 183 (H) 65 - 99 mg/dL    BUN 21.5 8.0 - 23.0 mg/dL    Creatinine 1.35 (H) 0.57 - 1.00 mg/dL    Sodium 133 (L) 136 - 145 mmol/L    Potassium 4.6 3.5 - 5.2 mmol/L     Chloride 95 (L) 98 - 107 mmol/L    CO2 24.8 22.0 - 29.0 mmol/L    Calcium 9.7 8.6 - 10.5 mg/dL    Total Protein 5.8 (L) 6.0 - 8.5 g/dL    Albumin 3.8 3.5 - 5.2 g/dL    ALT (SGPT) 13 1 - 33 U/L    AST (SGOT) 14 1 - 32 U/L    Alkaline Phosphatase 84 39 - 117 U/L    Total Bilirubin 0.2 0.0 - 1.2 mg/dL    Globulin 2.0 gm/dL    A/G Ratio 1.9 g/dL    BUN/Creatinine Ratio 15.9 7.0 - 25.0    Anion Gap 13.2 5.0 - 15.0 mmol/L    eGFR 43.2 (L) >60.0 mL/min/1.73   Magnesium    Collection Time: 06/10/25  5:47 PM    Specimen: Blood   Result Value Ref Range    Magnesium 1.3 (L) 1.6 - 2.4 mg/dL   TSH Rfx On Abnormal To Free T4    Collection Time: 06/10/25  5:47 PM    Specimen: Blood   Result Value Ref Range    TSH 1.260 0.270 - 4.200 uIU/mL   CBC Auto Differential    Collection Time: 06/10/25  5:47 PM    Specimen: Blood   Result Value Ref Range    WBC 6.86 3.40 - 10.80 10*3/mm3    RBC 3.29 (L) 3.77 - 5.28 10*6/mm3    Hemoglobin 9.1 (L) 12.0 - 15.9 g/dL    Hematocrit 28.5 (L) 34.0 - 46.6 %    MCV 86.6 79.0 - 97.0 fL    MCH 27.7 26.6 - 33.0 pg    MCHC 31.9 31.5 - 35.7 g/dL    RDW 15.8 (H) 12.3 - 15.4 %    RDW-SD 50.1 37.0 - 54.0 fl    MPV 8.9 6.0 - 12.0 fL    Platelets 388 140 - 450 10*3/mm3    Neutrophil % 62.0 42.7 - 76.0 %    Lymphocyte % 23.3 19.6 - 45.3 %    Monocyte % 8.7 5.0 - 12.0 %    Eosinophil % 5.2 0.3 - 6.2 %    Basophil % 0.4 0.0 - 1.5 %    Immature Grans % 0.4 0.0 - 0.5 %    Neutrophils, Absolute 4.24 1.70 - 7.00 10*3/mm3    Lymphocytes, Absolute 1.60 0.70 - 3.10 10*3/mm3    Monocytes, Absolute 0.60 0.10 - 0.90 10*3/mm3    Eosinophils, Absolute 0.36 0.00 - 0.40 10*3/mm3    Basophils, Absolute 0.03 0.00 - 0.20 10*3/mm3    Immature Grans, Absolute 0.03 0.00 - 0.05 10*3/mm3    nRBC 0.0 0.0 - 0.2 /100 WBC                                                      Medical Decision Making  Orthostatic measurements were attempted but patient unable to stand or walk.  She had some mild drop from laying to sitting.  She was ordered  some IV fluids she does appear mildly dehydrated as well.  In discussion with the patient she does not receive care from the senior living facility and is unable to manage at her current facility.  She is agreeable plan of admission with plan for further functional assessment and potential disposition to a skilled nursing or rehab facility.  Case discussed with Mariana with hospitalist service who is agreeable plan of admission.    Problems Addressed:  Dehydration: complicated acute illness or injury  Hypomagnesemia: complicated acute illness or injury  Weakness: complicated acute illness or injury    Amount and/or Complexity of Data Reviewed  External Data Reviewed: radiology.     Details: I reviewed the radiology reports from yesterday showing negative head CT and C-spine CT as well as chest x-ray and bilateral knee x-ray  Labs: ordered. Decision-making details documented in ED Course.     Details: CBC shows some anemia, no leukocytosis, TSH normal, magnesium somewhat low, comprehensive metabolic panel shows some mild hyponatremia and renal insufficiency, urinalysis negative for bacteria  ECG/medicine tests: ordered and independent interpretation performed.     Details: My EKG interpretation sinus rhythm rate of 90, right bundle branch block    Risk  OTC drugs.  Prescription drug management.  Decision regarding hospitalization.        Final diagnoses:   Weakness   Dehydration   Hypomagnesemia       ED Disposition  ED Disposition       ED Disposition   Decision to Admit    Condition   --    Comment   Level of Care: Med/Surg [1]   Admitting Physician: TANA CARCAMO [[id number]]   Attending Physician: TANA CARCAMO [[id number]]                 No follow-up provider specified.       Medication List      No changes were made to your prescriptions during this visit.            Nick Delgado MD  06/10/25 [id number]       Nick Delgado MD  06/10/25 [id number]

## 2025-06-10 NOTE — LETTER
EMS Transport Request  For use at [facility], [place], [place], [place], and [place] only   Patient Name: Jayda Mclean : 1960   Weight:120 kg (264 lb 8.8 oz) Pick-up Location: [facility] BLS/ALS: BLS/ALS: BLS   Insurance: [employer] MEDICARE REPLACEMENT Auth End Date:    Pre-Cert #: D/C Summary complete:    Destination: Other [facility] in [place]  Room 218.   Contact Precautions: None   Equipment (O2, Fluids, etc.): None   Arrive By Date/Time: 26 Stretcher/WC: Wheelchair   CM Requesting: Chrissie Becerra RN Ext:   [phone]   Notes/Medical Necessity: Mod assist for transfers.  (WILL CALL Please)     ______________________________________________________________________    *Only 2 patient bags OR 1 carry-on size bag are permitted.  Wheelchairs and walkers CANNOT transported with the patient. Acknowledge: Yes

## 2025-06-10 NOTE — H&P
[facility] Medicine Services    Hospitalist History and Physical     Jayda Mclean : 1960 MRN:[id number] LOS:0 ROOM: 260/1     Reason for admission: Generalized weakness     Assessment / Plan     Generalized Weakness  Frequent falls  Impaired mobility  Bilateral knee pain/ osteoarthrosis causing all above  Bilateral lower extremity edema (suspected dependent edema from impaired mobility)  - XR's bilateral knees with no acute fractures, osteoarthrosis  - pt awaiting knee surgery  - her knee pain has increased causing decreased mobility and increased falls  - pt states she cannot take hydrocodone any longer because her psychiatrist said she would have her anxiety medication taken away if she is on hydrocodone.   - start voltaren gel   - PT/OT eval, case management, may need placement   - check echo, proBNP (no c/o soa)    Acute kidney injury  Hypomagnesemia  - creatinine 1.35, Mg 1.3  - low rate IVFs for 12 hours started in ER  - electrolyte replacement protocol  - holding NSAIDs overnight, hold ARB, hold metformin     H/o DCIS right breast   - cont home letrozole     COPD  - not in exacerbation  - duonebs prn, symbicort, singulair    Hypertension  - hold home ARB due to JANUSZ above   - prn hydralazine     DM type II  - glucose 183  - hold metformin  - add SSI    Bipolar depression/PTSD  - cont home lamictal, cymbalta, zyprexa, prn xanax       Code Status (Patient has no pulse and is not breathing): CPR (Attempt to Resuscitate)  Medical Interventions (Patient has pulse or is breathing): Full Support       Nutrition:   Diet: Diabetic; Consistent Carbohydrate; Fluid Consistency: Thin (IDDSI 0)     VTE Prophylaxis:  Mechanical VTE prophylaxis orders are present.         History of Present illness     Jayda Mclean is a 65 y.o. female with PMH of DCIS right breast, COPD, HTN, DM type II, bipolar disorder/PTSD, chronic back pain who presented to [facility] ED from [facility] 6/10/2025 with  "reports of 2-3 falls. She stated her knees are \"bone on bone\" and she is scheduled for surgery on her knees August 17th. She is unsure which knee or what type of surgery she is having. She states the pain in her knees continues to worsen, which she feels has caused her falls. She states she feels she is becoming weaker and weaker due to her knee pain. She stated she is off hydrocodone because her psychiatrist told her that she either needs to stop her anxiety medications or stop the hydrocodone because she cannot take both. She has been off the hydrocodone for quite some time and takes ibuprofen 600mg every 8 hours for pain. She was at [facility] yesterday and knee xrays that showed No evidence of dislocation.  No significant joint effusion.  Mild-moderate joint space narrowing is present, worst in the medial compartment in the right knee and worst in the lateral compartment in the left knee. SOFT TISSUES: No radiographic evidence of soft tissue swelling.  No radiopaque foreign body. If the patient has persistent pain, a follow-up radiographic evaluation in 10-14 days, MRI, or three phase bone scan would be recommended to exclude an occult fracture.   She has bilateral lower extremity edema from her knees down and patient stated she was not aware her legs were swollen. She denies any chest pain or shortness of breath or history of Chf.     In the ED here the patient was found to have creatinine 1.35, Mg 1.3, glucose 183, hgb 9.1. UA showed trace leukocytes but otherwise unremarkable. She was given Mg replacement, 500cc LR. She was not able to ambulate in the ER. She was admitted for further treatment of frequent falls, weakness, JANUSZ, hypomagnesemia.       Subjective / Review of systems     Review of Systems   HENT: Negative.     Eyes: Negative.    Respiratory: Negative.     Cardiovascular: Negative.    Gastrointestinal: Negative.    Genitourinary: Negative.    Musculoskeletal:         Bilateral knee " pain    Skin: Negative.    Neurological:  Positive for weakness.        Weakness in bilateral legs due to bilateral knee pain   Psychiatric/Behavioral: Negative.          Past Medical/Surgical/Social/Family History & Allergies     Past Medical History:   Diagnosis Date    Anxiety     Bipolar disorder     Depression       Past Surgical History:   Procedure Laterality Date     SECTION      KNEE CARTILAGE SURGERY Right       Social History     Socioeconomic History    Marital status:    Tobacco Use    Smoking status: Every Day     Current packs/day: 1.50     Types: Cigarettes    Smokeless tobacco: Never   Vaping Use    Vaping status: Never Used   Substance and Sexual Activity    Alcohol use: Not Currently    Drug use: Never    Sexual activity: Defer      Family History   Problem Relation Age of Onset    Anxiety disorder Brother       Allergies   Allergen Reactions    Lithium Hives    Venlafaxine Hcl Er Hives    Latuda [Lurasidone] Hives    Vraylar [Cariprazine Hcl] Dizziness      Social Drivers of Health     Tobacco Use: High Risk (6/10/2025)    Patient History     Smoking Tobacco Use: Every Day     Smokeless Tobacco Use: Never     Passive Exposure: Not on file   Alcohol Use: Not At Risk (6/10/2025)    AUDIT-C     Frequency of Alcohol Consumption: Never     Average Number of Drinks: Patient does not drink     Frequency of Binge Drinking: Never   Financial Resource Strain: Not on file   Food Insecurity: Not on file   Transportation Needs: Not on file   Physical Activity: Not on file   Stress: Not on file   Social Connections: Not on file   Interpersonal Safety: Not At Risk (6/10/2025)    Abuse Screen     Unsafe at Home or Work/School: no     Feels Threatened by Someone?: no     Does Anyone Keep You from Contacting Others or Doint Things Outside the Home?: no     Physical Sign of Abuse Present: no   Depression: At risk (10/27/2020)    PHQ-2     PHQ-2 Score: 4   Housing Stability: Unknown (6/10/2025)     Housing Stability     Current Living Arrangements: home     Potentially Unsafe Housing Conditions: Not on file   Utilities: Not on file   Health Literacy: Not on file   Employment: Not on file   Disabilities: At Risk (6/10/2025)    Disabilities     Concentrating, Remembering, or Making Decisions Difficulty: no     Doing Errands Independently Difficulty: yes        Home Medications     Prior to Admission medications    Medication Sig Start Date End Date Taking? Authorizing Provider   albuterol (PROVENTIL) (2.5 MG/3ML) 0.083% nebulizer solution Take 2.5 mg by nebulization Every 4 (Four) Hours As Needed for Wheezing.   Yes Darnell Knott MD   ALPRAZolam (XANAX) 2 MG tablet Take 1 tablet by mouth 3 (Three) Times a Day As Needed for Anxiety.   Yes Darnell Knott MD   calcium carb-cholecalciferol (Calcium + Vitamin D3) 600-10 MG-MCG tablet per tablet Take 1 tablet by mouth Daily.   Yes Darnell Knott MD   candesartan (ATACAND) 8 MG tablet Take 1 tablet by mouth Daily.   Yes Darnell Knott MD   docusate sodium (COLACE) 100 MG capsule Take 1 capsule by mouth 2 (Two) Times a Day As Needed for Constipation.   Yes Darnell Knott MD   DULoxetine (CYMBALTA) 60 MG capsule Take 1 capsule by mouth Every Night.   Yes Darnell Knott MD   econazole nitrate (SPECTAZOLE) 1 % cream Apply 1 Application topically to the appropriate area as directed Daily As Needed.   Yes Darnell Knott MD   Fluticasone-Salmeterol (Wixela Inhub) 250-50 MCG/ACT DISKUS Inhale 1 puff 2 (Two) Times a Day.   Yes Darnell Knott MD   gabapentin (NEURONTIN) 400 MG capsule Take 1 capsule by mouth 3 (Three) Times a Day.   Yes Darnell Knott MD   ibuprofen (ADVIL,MOTRIN) 600 MG tablet Take 1 tablet by mouth 2 (Two) Times a Day.   Yes Darnell Knott MD   lamoTRIgine (LaMICtal) 100 MG tablet Take 1 tablet by mouth Every Night.   Yes Darnell Knott MD   letrozole (FEMARA) 2.5 MG tablet Take 1  tablet by mouth Daily.   Yes Darnell Knott MD   OLANZapine (zyPREXA) 10 MG tablet Take 1 tablet by mouth Every Night.   Yes Darnell Knott MD   aspirin 81 MG EC tablet Take 1 tablet by mouth Daily. 9/16/16  Yes Darnell Knott MD   atorvastatin (LIPITOR) 80 MG tablet Take 1 tablet by mouth Daily. 7/2/19  Yes Darnell Knott MD   cetirizine (zyrTEC) 10 MG tablet Take 1 tablet by mouth Daily As Needed. 9/5/19  Yes Darnell Knott MD   dicyclomine (BENTYL) 10 MG capsule Take 1 capsule by mouth 4 (Four) Times a Day As Needed. 8/28/19  Yes Darnell Knott MD   fenofibrate (TRICOR) 48 MG tablet Take 1 tablet by mouth Daily. 7/1/19  Yes Darnell Knott MD   fluticasone (FLONASE) 50 MCG/ACT nasal spray Administer 1 spray into the nostril(s) as directed by provider Daily. 8/1/19  Yes Darnell Knott MD   FREESTYLE LITE test strip USE 1 STRIP DAILY AND PRN 9/5/19   Darnell Knott MD   metFORMIN ER (GLUCOPHAGE-XR) 500 MG 24 hr tablet Take 2 tablets by mouth 2 (Two) Times a Day. 7/29/19  Yes Darnell Knott MD   montelukast (SINGULAIR) 10 MG tablet Take 1 tablet by mouth Every Night. 7/3/19  Yes Darnell Knott MD   pantoprazole (PROTONIX) 40 MG EC tablet Take 1 tablet by mouth Daily. 7/1/19  Yes ProviderDarnell MD   PROAIR  (90 Base) MCG/ACT inhaler Inhale 1 puff Every 4 (Four) Hours As Needed. 8/2/19  Yes Darnell Knott MD   ALPRAZolam (XANAX) 1 MG tablet Take 1 tablet by mouth 3 (Three) Times a Day As Needed for Anxiety. 4/26/21 6/10/25  Monet Woody PA-C   amitriptyline (ELAVIL) 25 MG tablet TAKE 1 TABLET BY MOUTH EVERY DAY AT NIGHT 4/26/21 6/10/25  Monet Woody PA-C   azelastine (ASTELIN) 0.1 % nasal spray U 2 SPRAYS IEN QD IN THE BARBARA 8/31/19 6/10/25  Provider, MD Darnell   Cariprazine HCl (Vraylar) 3 MG capsule capsule Take 1 capsule by mouth Daily. 1/27/21 6/10/25  Monet Woody PA-C    MG capsule Take 1  capsule by mouth 2 (Two) Times a Day As Needed. 7/1/19 6/10/25 Yes ProviderDarnell MD   glipiZIDE (GLUCOTROL) 10 MG tablet Take 10 mg by mouth 2 (Two) Times a Day. 7/11/19 6/10/25  Darnell Knott MD   HYDROcodone-acetaminophen (NORCO) 7.5-325 MG per tablet HYDROCODONE-ACETAMINOPHEN 7.5-325 MG TABS 9/16/16 6/10/25  Darnell Knott MD   hydrOXYzine pamoate (VISTARIL) 25 MG capsule Take 1 capsule by mouth 3 (Three) Times a Day As Needed for Anxiety. 9/6/19 6/10/25  Monet Woody PA-C   LOSARTAN POTASSIUM PO LOSARTAN POTASSIUM TABS 9/16/16 6/10/25  Darnell Knott MD        Objective / Physical Exam     Vital signs:  Temp: 97.4 °F (36.3 °C)  BP: (!) 187/73  Heart Rate: 89  Resp: 18  SpO2: 96 %  Weight: 118 kg (259 lb 0.7 oz)    Admission Weight: Weight: 118 kg (260 lb 8 oz)    Physical Exam  Vitals and nursing note reviewed.   Constitutional:       Appearance: She is obese.   HENT:      Head: Normocephalic and atraumatic.   Eyes:      Extraocular Movements: Extraocular movements intact.      Pupils: Pupils are equal, round, and reactive to light.   Cardiovascular:      Rate and Rhythm: Normal rate and regular rhythm.      Pulses: Normal pulses.      Heart sounds: Murmur heard.   Pulmonary:      Effort: Pulmonary effort is normal.      Breath sounds: Normal breath sounds.   Abdominal:      General: Bowel sounds are normal.      Palpations: Abdomen is soft.      Tenderness: There is no abdominal tenderness.   Musculoskeletal:      Right lower leg: Edema present.      Left lower leg: Edema present.      Comments: Patient is unable to move bilateral lower extremities   2+ pitting edema bilateral lower extremities    Skin:     General: Skin is warm and dry.   Neurological:      Mental Status: She is alert and oriented to person, place, and time.   Psychiatric:         Mood and Affect: Mood normal.         Behavior: Behavior normal.          Labs     Results from last 7 days   Lab Units  06/10/25  1747   WBC 10*3/mm3 6.86   HEMOGLOBIN g/dL 9.1*   HEMATOCRIT % 28.5*   PLATELETS 10*3/mm3 388      Results from last 7 days   Lab Units 06/10/25  1747   ALK PHOS U/L 84   AST (SGOT) U/L 14   ALT (SGPT) U/L 13           Results from last 7 days   Lab Units 06/10/25  1747   SODIUM mmol/L 133*   POTASSIUM mmol/L 4.6   CHLORIDE mmol/L 95*   CO2 mmol/L 24.8   BUN mg/dL 21.5   CREATININE mg/dL 1.35*   GLUCOSE mg/dL 183*     Current Medications     Scheduled Meds:  aspirin, 81 mg, Oral, Daily  atorvastatin, 80 mg, Oral, Daily  budesonide-formoterol, 2 puff, Inhalation, BID - RT  [START ON 6/11/2025] calcium 500 mg vitamin D 5 mcg (200 UT), 1 tablet, Oral, Daily  Diclofenac Sodium, 4 g, Topical, 4x Daily  DULoxetine, 60 mg, Oral, Nightly  [START ON 6/11/2025] fenofibrate, 48 mg, Oral, Daily  gabapentin, 400 mg, Oral, TID  insulin lispro, 2-7 Units, Subcutaneous, 4x Daily AC & at Bedtime  lamoTRIgine, 100 mg, Oral, Nightly  [START ON 6/11/2025] letrozole, 2.5 mg, Oral, Daily  magnesium sulfate, 2 g, Intravenous, Q2H  montelukast, 10 mg, Oral, Nightly  OLANZapine, 10 mg, Oral, Nightly  [START ON 6/11/2025] pantoprazole, 40 mg, Oral, Daily  sodium chloride, 10 mL, Intravenous, Q12H         Continuous Infusions:  sodium chloride, 100 mL/hr, Last Rate: 100 mL/hr (06/10/25 2132)           Casie Mora, [facility] Medicine  06/10/25   22:05 EDT

## 2025-06-11 ENCOUNTER — APPOINTMENT (OUTPATIENT)
Dept: CARDIOLOGY | Facility: HOSPITAL | Age: 65
DRG: 683 | End: 2025-06-11
Payer: MEDICARE

## 2025-06-11 LAB
ANION GAP SERPL CALCULATED.3IONS-SCNC: 9.5 MMOL/L (ref 5–15)
AORTIC DIMENSIONLESS INDEX: 0.61 (DI)
AV MEAN PRESS GRAD SYS DOP V1V2: 20.5 MMHG
AV VMAX SYS DOP: 288.3 CM/SEC
BASOPHILS # BLD AUTO: 0.04 10*3/MM3 (ref 0–0.2)
BASOPHILS NFR BLD AUTO: 0.7 % (ref 0–1.5)
BH CV ECHO LEFT VENTRICLE GLOBAL LONGITUDINAL STRAIN: -16 %
BH CV ECHO MEAS - ACS: 1.7 CM
BH CV ECHO MEAS - AO MAX PG: 33.2 MMHG
BH CV ECHO MEAS - AO V2 VTI: 72.7 CM
BH CV ECHO MEAS - AVA(I,D): 1.65 CM2
BH CV ECHO MEAS - EDV(CUBED): 47.2 ML
BH CV ECHO MEAS - EDV(MOD-SP4): 200 ML
BH CV ECHO MEAS - EF(MOD-SP4): 70 %
BH CV ECHO MEAS - ESV(CUBED): 11.6 ML
BH CV ECHO MEAS - ESV(MOD-SP4): 60.1 ML
BH CV ECHO MEAS - FS: 37.3 %
BH CV ECHO MEAS - IVS/LVPW: 0.95 CM
BH CV ECHO MEAS - IVSD: 1.16 CM
BH CV ECHO MEAS - LA DIMENSION: 4.1 CM
BH CV ECHO MEAS - LAT PEAK E' VEL: 7.6 CM/SEC
BH CV ECHO MEAS - LV DIASTOLIC VOL/BSA (35-75): 91.6 CM2
BH CV ECHO MEAS - LV MASS(C)D: 140.6 GRAMS
BH CV ECHO MEAS - LV MAX PG: 10.9 MMHG
BH CV ECHO MEAS - LV MEAN PG: 6.2 MMHG
BH CV ECHO MEAS - LV SYSTOLIC VOL/BSA (12-30): 27.5 CM2
BH CV ECHO MEAS - LV V1 MAX: 165.3 CM/SEC
BH CV ECHO MEAS - LV V1 VTI: 44 CM
BH CV ECHO MEAS - LVIDD: 3.6 CM
BH CV ECHO MEAS - LVIDS: 2.26 CM
BH CV ECHO MEAS - LVOT AREA: 2.7 CM2
BH CV ECHO MEAS - LVOT DIAM: 1.86 CM
BH CV ECHO MEAS - LVPWD: 1.22 CM
BH CV ECHO MEAS - MED PEAK E' VEL: 6.7 CM/SEC
BH CV ECHO MEAS - MV A MAX VEL: 143.3 CM/SEC
BH CV ECHO MEAS - MV DEC SLOPE: 837.9 CM/SEC2
BH CV ECHO MEAS - MV DEC TIME: 0.17 SEC
BH CV ECHO MEAS - MV E MAX VEL: 197 CM/SEC
BH CV ECHO MEAS - MV E/A: 1.37
BH CV ECHO MEAS - MV MAX PG: 15.5 MMHG
BH CV ECHO MEAS - MV MEAN PG: 6.3 MMHG
BH CV ECHO MEAS - MV P1/2T: 68.3 MSEC
BH CV ECHO MEAS - MV V2 VTI: 44.4 CM
BH CV ECHO MEAS - MVA(P1/2T): 3.2 CM2
BH CV ECHO MEAS - MVA(VTI): 2.7 CM2
BH CV ECHO MEAS - PA ACC TIME: 0.14 SEC
BH CV ECHO MEAS - PA V2 MAX: 128.3 CM/SEC
BH CV ECHO MEAS - RAP SYSTOLE: 8 MMHG
BH CV ECHO MEAS - RV MAX PG: 3.5 MMHG
BH CV ECHO MEAS - RV V1 MAX: 93.3 CM/SEC
BH CV ECHO MEAS - RV V1 VTI: 20.2 CM
BH CV ECHO MEAS - SV(LVOT): 120.1 ML
BH CV ECHO MEAS - SV(MOD-SP4): 139.9 ML
BH CV ECHO MEAS - SVI(LVOT): 55 ML/M2
BH CV ECHO MEAS - SVI(MOD-SP4): 64.1 ML/M2
BH CV ECHO MEAS - TAPSE (>1.6): 2.7 CM
BH CV ECHO MEASUREMENTS AVERAGE E/E' RATIO: 27.55
BH CV LOWER VASCULAR LEFT COMMON FEMORAL AUGMENT: NORMAL
BH CV LOWER VASCULAR LEFT COMMON FEMORAL COMPETENT: NORMAL
BH CV LOWER VASCULAR LEFT COMMON FEMORAL COMPRESS: NORMAL
BH CV LOWER VASCULAR LEFT COMMON FEMORAL PHASIC: NORMAL
BH CV LOWER VASCULAR LEFT COMMON FEMORAL SPONT: NORMAL
BH CV LOWER VASCULAR LEFT DISTAL FEMORAL COMPRESS: NORMAL
BH CV LOWER VASCULAR LEFT GREATER SAPH AK COMPRESS: NORMAL
BH CV LOWER VASCULAR LEFT MID FEMORAL AUGMENT: NORMAL
BH CV LOWER VASCULAR LEFT MID FEMORAL COMPETENT: NORMAL
BH CV LOWER VASCULAR LEFT MID FEMORAL COMPRESS: NORMAL
BH CV LOWER VASCULAR LEFT MID FEMORAL PHASIC: NORMAL
BH CV LOWER VASCULAR LEFT MID FEMORAL SPONT: NORMAL
BH CV LOWER VASCULAR LEFT PERONEAL COMPRESS: NORMAL
BH CV LOWER VASCULAR LEFT POPLITEAL AUGMENT: NORMAL
BH CV LOWER VASCULAR LEFT POPLITEAL COMPETENT: NORMAL
BH CV LOWER VASCULAR LEFT POPLITEAL COMPRESS: NORMAL
BH CV LOWER VASCULAR LEFT POPLITEAL PHASIC: NORMAL
BH CV LOWER VASCULAR LEFT POPLITEAL SPONT: NORMAL
BH CV LOWER VASCULAR LEFT POSTERIOR TIBIAL COMPRESS: NORMAL
BH CV LOWER VASCULAR LEFT PROXIMAL FEMORAL COMPRESS: NORMAL
BH CV LOWER VASCULAR LEFT SAPHENOFEMORAL JUNCTION COMPRESS: NORMAL
BH CV LOWER VASCULAR RIGHT COMMON FEMORAL AUGMENT: NORMAL
BH CV LOWER VASCULAR RIGHT COMMON FEMORAL COMPETENT: NORMAL
BH CV LOWER VASCULAR RIGHT COMMON FEMORAL COMPRESS: NORMAL
BH CV LOWER VASCULAR RIGHT COMMON FEMORAL PHASIC: NORMAL
BH CV LOWER VASCULAR RIGHT COMMON FEMORAL SPONT: NORMAL
BH CV LOWER VASCULAR RIGHT DISTAL FEMORAL COMPRESS: NORMAL
BH CV LOWER VASCULAR RIGHT GREATER SAPH AK COMPRESS: NORMAL
BH CV LOWER VASCULAR RIGHT MID FEMORAL AUGMENT: NORMAL
BH CV LOWER VASCULAR RIGHT MID FEMORAL COMPETENT: NORMAL
BH CV LOWER VASCULAR RIGHT MID FEMORAL COMPRESS: NORMAL
BH CV LOWER VASCULAR RIGHT MID FEMORAL PHASIC: NORMAL
BH CV LOWER VASCULAR RIGHT MID FEMORAL SPONT: NORMAL
BH CV LOWER VASCULAR RIGHT POPLITEAL AUGMENT: NORMAL
BH CV LOWER VASCULAR RIGHT POPLITEAL COMPETENT: NORMAL
BH CV LOWER VASCULAR RIGHT POPLITEAL COMPRESS: NORMAL
BH CV LOWER VASCULAR RIGHT POPLITEAL PHASIC: NORMAL
BH CV LOWER VASCULAR RIGHT POPLITEAL SPONT: NORMAL
BH CV LOWER VASCULAR RIGHT PROXIMAL FEMORAL COMPRESS: NORMAL
BH CV LOWER VASCULAR RIGHT SAPHENOFEMORAL JUNCTION COMPRESS: NORMAL
BH CV XLRA - RV BASE: 4.3 CM
BH CV XLRA - RV LENGTH: 9.4 CM
BH CV XLRA - RV MID: 3.4 CM
BH CV XLRA - TDI S': 13.4 CM/SEC
BUN SERPL-MCNC: 20.6 MG/DL (ref 8–23)
BUN/CREAT SERPL: 18.1 (ref 7–25)
CALCIUM SPEC-SCNC: 9.4 MG/DL (ref 8.6–10.5)
CHLORIDE SERPL-SCNC: 95 MMOL/L (ref 98–107)
CO2 SERPL-SCNC: 26.5 MMOL/L (ref 22–29)
CREAT SERPL-MCNC: 1.14 MG/DL (ref 0.57–1)
DEPRECATED RDW RBC AUTO: 51.8 FL (ref 37–54)
EGFRCR SERPLBLD CKD-EPI 2021: 52.9 ML/MIN/1.73
EOSINOPHIL # BLD AUTO: 0.52 10*3/MM3 (ref 0–0.4)
EOSINOPHIL NFR BLD AUTO: 8.6 % (ref 0.3–6.2)
ERYTHROCYTE [DISTWIDTH] IN BLOOD BY AUTOMATED COUNT: 16.1 % (ref 12.3–15.4)
GLUCOSE BLDC GLUCOMTR-MCNC: 133 MG/DL (ref 70–105)
GLUCOSE BLDC GLUCOMTR-MCNC: 167 MG/DL (ref 70–105)
GLUCOSE BLDC GLUCOMTR-MCNC: 169 MG/DL (ref 70–105)
GLUCOSE BLDC GLUCOMTR-MCNC: 241 MG/DL (ref 70–105)
GLUCOSE SERPL-MCNC: 129 MG/DL (ref 65–99)
HCT VFR BLD AUTO: 28.7 % (ref 34–46.6)
HGB BLD-MCNC: 9.2 G/DL (ref 12–15.9)
IMM GRANULOCYTES # BLD AUTO: 0.04 10*3/MM3 (ref 0–0.05)
IMM GRANULOCYTES NFR BLD AUTO: 0.7 % (ref 0–0.5)
LEFT ATRIUM VOLUME INDEX: 31.2 ML/M2
LYMPHOCYTES # BLD AUTO: 1.88 10*3/MM3 (ref 0.7–3.1)
LYMPHOCYTES NFR BLD AUTO: 31.1 % (ref 19.6–45.3)
MAGNESIUM SERPL-MCNC: 3.3 MG/DL (ref 1.6–2.4)
MCH RBC QN AUTO: 28.1 PG (ref 26.6–33)
MCHC RBC AUTO-ENTMCNC: 32.1 G/DL (ref 31.5–35.7)
MCV RBC AUTO: 87.8 FL (ref 79–97)
MONOCYTES # BLD AUTO: 0.58 10*3/MM3 (ref 0.1–0.9)
MONOCYTES NFR BLD AUTO: 9.6 % (ref 5–12)
NEUTROPHILS NFR BLD AUTO: 2.99 10*3/MM3 (ref 1.7–7)
NEUTROPHILS NFR BLD AUTO: 49.3 % (ref 42.7–76)
NRBC BLD AUTO-RTO: 0 /100 WBC (ref 0–0.2)
PLATELET # BLD AUTO: 384 10*3/MM3 (ref 140–450)
PMV BLD AUTO: 9 FL (ref 6–12)
POTASSIUM SERPL-SCNC: 4.5 MMOL/L (ref 3.5–5.2)
QT INTERVAL: 379 MS
QTC INTERVAL: 465 MS
RBC # BLD AUTO: 3.27 10*6/MM3 (ref 3.77–5.28)
SINUS: 2.9 CM
SODIUM SERPL-SCNC: 131 MMOL/L (ref 136–145)
STJ: 2.38 CM
WBC NRBC COR # BLD AUTO: 6.05 10*3/MM3 (ref 3.4–10.8)

## 2025-06-11 PROCEDURE — 93306 TTE W/DOPPLER COMPLETE: CPT

## 2025-06-11 PROCEDURE — 63710000001 INSULIN LISPRO (HUMAN) PER 5 UNITS: Performed by: NURSE PRACTITIONER

## 2025-06-11 PROCEDURE — 93970 EXTREMITY STUDY: CPT

## 2025-06-11 PROCEDURE — 85025 COMPLETE CBC W/AUTO DIFF WBC: CPT

## 2025-06-11 PROCEDURE — 97166 OT EVAL MOD COMPLEX 45 MIN: CPT

## 2025-06-11 PROCEDURE — 83735 ASSAY OF MAGNESIUM: CPT

## 2025-06-11 PROCEDURE — 94761 N-INVAS EAR/PLS OXIMETRY MLT: CPT

## 2025-06-11 PROCEDURE — 82948 REAGENT STRIP/BLOOD GLUCOSE: CPT | Performed by: NURSE PRACTITIONER

## 2025-06-11 PROCEDURE — 93970 EXTREMITY STUDY: CPT | Performed by: STUDENT IN AN ORGANIZED HEALTH CARE EDUCATION/TRAINING PROGRAM

## 2025-06-11 PROCEDURE — 94799 UNLISTED PULMONARY SVC/PX: CPT

## 2025-06-11 PROCEDURE — 25010000002 HYDRALAZINE PER 20 MG: Performed by: NURSE PRACTITIONER

## 2025-06-11 PROCEDURE — 80048 BASIC METABOLIC PNL TOTAL CA: CPT

## 2025-06-11 PROCEDURE — G0378 HOSPITAL OBSERVATION PER HR: HCPCS

## 2025-06-11 PROCEDURE — 25010000002 MAGNESIUM SULFATE 2 GM/50ML SOLUTION: Performed by: STUDENT IN AN ORGANIZED HEALTH CARE EDUCATION/TRAINING PROGRAM

## 2025-06-11 PROCEDURE — 93356 MYOCRD STRAIN IMG SPCKL TRCK: CPT | Performed by: INTERNAL MEDICINE

## 2025-06-11 PROCEDURE — 94664 DEMO&/EVAL PT USE INHALER: CPT

## 2025-06-11 PROCEDURE — 93356 MYOCRD STRAIN IMG SPCKL TRCK: CPT

## 2025-06-11 PROCEDURE — 82948 REAGENT STRIP/BLOOD GLUCOSE: CPT

## 2025-06-11 PROCEDURE — 97162 PT EVAL MOD COMPLEX 30 MIN: CPT

## 2025-06-11 PROCEDURE — 93306 TTE W/DOPPLER COMPLETE: CPT | Performed by: INTERNAL MEDICINE

## 2025-06-11 RX ORDER — METOPROLOL TARTRATE 25 MG/1
25 TABLET, FILM COATED ORAL EVERY 12 HOURS SCHEDULED
Status: DISCONTINUED | OUTPATIENT
Start: 2025-06-11 | End: 2025-06-11

## 2025-06-11 RX ORDER — HYDRALAZINE HYDROCHLORIDE 25 MG/1
25 TABLET, FILM COATED ORAL EVERY 8 HOURS SCHEDULED
Status: DISCONTINUED | OUTPATIENT
Start: 2025-06-11 | End: 2025-06-12 | Stop reason: HOSPADM

## 2025-06-11 RX ORDER — HYDROCODONE BITARTRATE AND ACETAMINOPHEN 10; 325 MG/1; MG/1
1 TABLET ORAL ONCE AS NEEDED
Refills: 0 | Status: COMPLETED | OUTPATIENT
Start: 2025-06-11 | End: 2025-06-11

## 2025-06-11 RX ORDER — HYDRALAZINE HYDROCHLORIDE 25 MG/1
25 TABLET, FILM COATED ORAL EVERY 8 HOURS SCHEDULED
Status: DISCONTINUED | OUTPATIENT
Start: 2025-06-11 | End: 2025-06-11

## 2025-06-11 RX ORDER — METOPROLOL TARTRATE 25 MG/1
25 TABLET, FILM COATED ORAL EVERY 12 HOURS SCHEDULED
Status: DISCONTINUED | OUTPATIENT
Start: 2025-06-11 | End: 2025-06-12 | Stop reason: HOSPADM

## 2025-06-11 RX ADMIN — Medication 10 ML: at 19:32

## 2025-06-11 RX ADMIN — OLANZAPINE 10 MG: 10 TABLET, FILM COATED ORAL at 19:31

## 2025-06-11 RX ADMIN — FENOFIBRATE 48 MG: 48 TABLET ORAL at 08:23

## 2025-06-11 RX ADMIN — LETROZOLE 2.5 MG: 2.5 TABLET ORAL at 08:23

## 2025-06-11 RX ADMIN — SENNOSIDES AND DOCUSATE SODIUM 2 TABLET: 50; 8.6 TABLET ORAL at 08:31

## 2025-06-11 RX ADMIN — PANTOPRAZOLE SODIUM 40 MG: 40 TABLET, DELAYED RELEASE ORAL at 08:23

## 2025-06-11 RX ADMIN — BUDESONIDE AND FORMOTEROL FUMARATE DIHYDRATE 2 PUFF: 160; 4.5 AEROSOL RESPIRATORY (INHALATION) at 08:44

## 2025-06-11 RX ADMIN — MONTELUKAST 10 MG: 10 TABLET, FILM COATED ORAL at 19:31

## 2025-06-11 RX ADMIN — LAMOTRIGINE 100 MG: 100 TABLET ORAL at 19:31

## 2025-06-11 RX ADMIN — INSULIN LISPRO 3 UNITS: 100 INJECTION, SOLUTION INTRAVENOUS; SUBCUTANEOUS at 20:56

## 2025-06-11 RX ADMIN — ACETAMINOPHEN 650 MG: 325 TABLET, FILM COATED ORAL at 08:31

## 2025-06-11 RX ADMIN — DICLOFENAC SODIUM 4 G: 10 GEL TOPICAL at 08:23

## 2025-06-11 RX ADMIN — METOPROLOL TARTRATE 25 MG: 25 TABLET, FILM COATED ORAL at 18:27

## 2025-06-11 RX ADMIN — HYDRALAZINE HYDROCHLORIDE 10 MG: 20 INJECTION INTRAMUSCULAR; INTRAVENOUS at 16:33

## 2025-06-11 RX ADMIN — GABAPENTIN 400 MG: 400 CAPSULE ORAL at 19:31

## 2025-06-11 RX ADMIN — ATORVASTATIN CALCIUM 80 MG: 40 TABLET, FILM COATED ORAL at 08:23

## 2025-06-11 RX ADMIN — HYDRALAZINE HYDROCHLORIDE 25 MG: 25 TABLET ORAL at 18:26

## 2025-06-11 RX ADMIN — DULOXETINE 60 MG: 30 CAPSULE, DELAYED RELEASE ORAL at 19:31

## 2025-06-11 RX ADMIN — DICLOFENAC SODIUM 4 G: 10 GEL TOPICAL at 12:15

## 2025-06-11 RX ADMIN — ASPIRIN 81 MG: 81 TABLET, COATED ORAL at 08:23

## 2025-06-11 RX ADMIN — MAGNESIUM SULFATE HEPTAHYDRATE 2 G: 40 INJECTION, SOLUTION INTRAVENOUS at 01:33

## 2025-06-11 RX ADMIN — GABAPENTIN 400 MG: 400 CAPSULE ORAL at 08:23

## 2025-06-11 RX ADMIN — INSULIN LISPRO 2 UNITS: 100 INJECTION, SOLUTION INTRAVENOUS; SUBCUTANEOUS at 16:38

## 2025-06-11 RX ADMIN — ACETAMINOPHEN 650 MG: 325 TABLET, FILM COATED ORAL at 18:29

## 2025-06-11 RX ADMIN — GABAPENTIN 400 MG: 400 CAPSULE ORAL at 15:45

## 2025-06-11 RX ADMIN — HYDROCODONE BITARTRATE AND ACETAMINOPHEN 1 TABLET: 10; 325 TABLET ORAL at 20:56

## 2025-06-11 RX ADMIN — Medication 10 ML: at 08:23

## 2025-06-11 RX ADMIN — DICLOFENAC SODIUM 4 G: 10 GEL TOPICAL at 17:10

## 2025-06-11 RX ADMIN — Medication 1 TABLET: at 08:23

## 2025-06-11 RX ADMIN — BUDESONIDE AND FORMOTEROL FUMARATE DIHYDRATE 2 PUFF: 160; 4.5 AEROSOL RESPIRATORY (INHALATION) at 20:13

## 2025-06-11 RX ADMIN — INSULIN LISPRO 2 UNITS: 100 INJECTION, SOLUTION INTRAVENOUS; SUBCUTANEOUS at 12:15

## 2025-06-11 RX ADMIN — DICLOFENAC SODIUM 4 G: 10 GEL TOPICAL at 19:31

## 2025-06-11 NOTE — PLAN OF CARE
Goal Outcome Evaluation:  Plan of Care Reviewed With: patient           Outcome Evaluation: 65 y.o. female with PMH of DCIS right breast, COPD, HTN, DM type II, bipolar disorder/PTSD, chronic back pain who presented to [facility] ED from [facility] independent living facility 6/10/2025 with reports of 2-3 falls, reportedly due to bilateral knee pain which has caused progressive weakness. Imaging of knees done at [facility] shows mild-moderate joint space narrowing in both knees. Also noted to have BLE swelling. She is scheduled for knee surgery 8/17/25, though she reports she doesn't know what the operation is going to be. At baseline, she lives in an independent living facility where she is IND with functional mobility using a rollator. She uses a w/c for mobility in the community. She is IND with dressing, though she has a caregiver 4 days/week who assists with bathing and all IADLs. She does however admit to  falling several times recently. This date, pt A&Ox4. She is in the chair upon arrival. From chair level, she is Max A-Dependent for lower body self-care including dressing. She requires Mod A to come to standing with RW. She declined attempts to ambulate secondary to pain, however was willing to attempt marching in place. After marching ~10 seconds, she reported pain in knees to be unbearable and uncontrollably returned to sitting in recliner. OT used gait belt to slow the sit for safety concerns. Currently, pt is far from her baseline level. She is unsafe to return to ILF in current state, with concerns for safety during mobility and functional task completion. OT will follow, recommending SNF at [place].    Anticipated Discharge Disposition (OT): skilled nursing facility

## 2025-06-11 NOTE — CASE MANAGEMENT/SOCIAL WORK
Discharge Planning Assessment   [place]     Patient Name: Jayda Mclean  MRN: [id number]  Today's Date: 6/11/2025    Admit Date: 6/10/2025    Plan: D/C Plan: [facility].  SHEILA approved. Ins auth initiated 6/11/25 at 1445.  Transport TBD   Discharge Needs Assessment       Row Name 06/11/25 1525       Living Environment    People in Home alone    Current Living Arrangements home    Potentially Unsafe Housing Conditions none    In the past 12 months has the electric, gas, oil, or water company threatened to shut off services in your home? No    Primary Care Provided by self    Provides Primary Care For no one    Quality of Family Relationships unable to assess    Able to Return to Prior Arrangements other (see comments)  Skilled rehab is recommended       Resource/Environmental Concerns    Resource/Environmental Concerns none    Transportation Concerns none       Transportation Needs    In the past 12 months, has lack of transportation kept you from medical appointments or from getting medications? no    In the past 12 months, has lack of transportation kept you from meetings, work, or from getting things needed for daily living? No       Food Insecurity    Within the past 12 months, you worried that your food would run out before you got the money to buy more. Never true    Within the past 12 months, the food you bought just didn't last and you didn't have money to get more. Never true       Transition Planning    Patient/Family Anticipates Transition to long-term care facility    Patient/Family Anticipated Services at Transition skilled nursing    Transportation Anticipated other (see comments)       Discharge Needs Assessment    Readmission Within the Last 30 Days no previous admission in last 30 days    Concerns to be Addressed discharge planning    Do you want help finding or keeping work or a job? I do not need or want help    Do you want help with school or training? For example, starting or  completing job training or getting a high school diploma, GED or equivalent No    Anticipated Changes Related to Illness none    Equipment Needed After Discharge none    Outpatient/Agency/Support Group Needs skilled nursing facility    Discharge Facility/Level of Care Needs nursing facility, skilled    Provided Post Acute Provider List? Yes    Post Acute Provider List Nursing Home    Delivered To Patient    Method of Delivery In person    Offered/Gave Vendor List yes    Patient's Choice of Community Agency(s) [place]    Current Discharge Risk lives alone                   Discharge Plan       Row Name 06/11/25 1527       Plan    Plan D/C Plan: [facility] skillled unit.  PASRR approved. Ins auth initiated 6/11/25 at 1445.  Transport TBD    Plan Comments Met with pt at bedside. Lives at [facility] apts. Has 30hr a week caregiver thru [facility] waiver program. Pt has needed DME for home use. Caregiver assist with bathing cooking cleaning and transportation. No difficulties paying for meds. Not eligible for M2B's.  Will need skilled rehab prior to rerturning to ..  Pt chose [facility] which is on same campus as her apt. Referral made.  Barrier to D/C: Medical clearance.                  Continued Care and Services - Admitted Since 6/10/2025       Destination       Service Provider Request Status Services Address Phone Fax Patient Preferred    [facility] Accepted -- [address] [place] [address] [phone] --                  Expected Discharge Date and Time       Expected Discharge Date Expected Discharge Time    Jun 12, 2025            Demographic Summary       Row Name 06/11/25 1524       General Information    Admission Type observation    Arrived From emergency department    Required Notices Provided Observation Status Notice    Referral Source admission list    Reason for Consult discharge planning    Preferred Language English      Row Name 06/11/25 1519       General  Information    Admission Type inpatient    Arrived From emergency department    Required Notices Provided Important Message from Medicare    Referral Source admission list    Reason for Consult discharge planning    Preferred Language English                   Functional Status       Row Name 06/11/25 1524       Functional Status    Usual Activity Tolerance good    Current Activity Tolerance moderate       Functional Status, IADL    Medications independent    Meal Preparation assistive person    Housekeeping assistive person    Laundry assistive person    Shopping assistive person    If for any reason you need help with day-to-day activities such as bathing, preparing meals, shopping, managing finances, etc., do you get the help you need? I get all the help I need       Mental Status    General Appearance WDL WDL       Mental Status Summary    Recent Changes in Mental Status/Cognitive Functioning no changes       Employment/    Employment Status disabled    Current or Previous Occupation not applicable                        Chrissie Becerra RN  RN/.  Office Ph. [phone]  Cell Ph.  [phone]

## 2025-06-11 NOTE — THERAPY EVALUATION
Patient Name: Jayda Mclean  : 1960    MRN: [id number]                              Today's Date: 2025       Admit Date: 6/10/2025    Visit Dx:     ICD-10-CM ICD-9-CM   1. Weakness  R53.1 780.79   2. Dehydration  E86.0 276.51   3. Hypomagnesemia  E83.42 275.2     Patient Active Problem List   Diagnosis    Bipolar affective disorder, mixed    Generalized anxiety disorder    Insomnia due to mental disorder    Generalized weakness     Past Medical History:   Diagnosis Date    Anxiety     Bipolar disorder     Depression      Past Surgical History:   Procedure Laterality Date     SECTION      KNEE CARTILAGE SURGERY Right       General Information       Row Name 25 0855          Physical Therapy Time and Intention    Document Type evaluation  -AO     Mode of Treatment physical therapy  -AO       Row Name 25 0855          General Information    Patient Profile Reviewed yes  -AO     Prior Level of Function --  MOD I w/ short-distance household ambulation w/ Rollator walker, has w/c but wasn't using, no BSC, assist for bathing from paid caregiver, reports 5-10 falls over the past year  -AO     Existing Precautions/Restrictions fall  -AO     Barriers to Rehab previous functional deficit  B knee pain, [place] (awaiting hearing aides, appointment 2025)  -AO       Row Name 25 0855          Living Environment    Current Living Arrangements apartment  -AO     People in Home alone  Has paid caregiver M/W//Fri for 6 hours/day  -AO       Row Name 25 0855          Home Main Entrance    Number of Stairs, Main Entrance none  -AO       Row Name 25 0855          Stairs Within Home, Primary    Number of Stairs, Within Home, Primary none  -AO       Row Name 25 0855          Cognition    Orientation Status (Cognition) oriented x 4  -AO       Row Name 25 0855          Safety Issues/Impairments Affecting Functional Mobility    Safety Issues Affecting Function (Mobility)  judgment;positioning of assistive device;problem-solving  -AO     Impairments Affecting Function (Mobility) balance;endurance/activity tolerance;pain;range of motion (ROM);sensation/sensory awareness;strength  -AO               User Key  (r) = Recorded By, (t) = Taken By, (c) = Cosigned By      Initials Name Provider Type    AO Janis Araiza, PT Physical Therapist                   Mobility       Row Name 06/11/25 0855          Bed Mobility    Bed Mobility supine-sit  -AO     Supine-Sit [place] (Bed Mobility) contact guard  -AO     Assistive Device (Bed Mobility) bed rails  -AO     Comment, (Bed Mobility) Increased time to complete, difficulty scooting to EOB  -AO       Row Name 06/11/25 0855          Bed-Chair Transfer    Bed-Chair [place] (Transfers) minimum assist (75% patient effort)  -AO     Assistive Device (Bed-Chair Transfers) walker, front-wheeled  -AO       Row Name 06/11/25 0855          Sit-Stand Transfer    Sit-Stand [place] (Transfers) moderate assist (50% patient effort)  -AO     Assistive Device (Sit-Stand Transfers) walker, front-wheeled  -AO       Row Name 06/11/25 0855          Gait/Stairs (Locomotion)    [place] Level (Gait) minimum assist (75% patient effort)  -AO     Assistive Device (Gait) walker, front-wheeled  -AO     Patient was able to Ambulate yes  -AO     Distance in Feet (Gait) 3  to recliner  -AO     Deviations/Abnormal Patterns (Gait) antalgic;christianne decreased;stride length decreased;gait speed decreased  -AO     Bilateral Gait Deviations forward flexed posture;heel strike decreased  -AO       Row Name 06/11/25 0855          Mobility    Extremity Weight-bearing Status left lower extremity;right lower extremity  -AO     Left Lower Extremity (Weight-bearing Status) weight-bearing as tolerated (WBAT)  -AO     Right Lower Extremity (Weight-bearing Status) weight-bearing as tolerated (WBAT)  -AO               User Key  (r) = Recorded By, (t) = Taken By, (c) =  Cosigned By      Initials Name Provider Type    AO Janis Araiza, PT Physical Therapist                   Obj/Interventions       Row Name 06/11/25 0855          Range of Motion Comprehensive    General Range of Motion lower extremity range of motion deficits identified  -AO     Comment, General Range of Motion B knee flexion/extension impaired ~25% by pain/weakness  -AO       Row Name 06/11/25 0855          Strength Comprehensive (MMT)    General Manual Muscle Testing (MMT) Assessment lower extremity strength deficits identified  -AO     Comment, General Manual Muscle Testing (MMT) Assessment B hip flexion: 4-/5, B knee flexion/extension: 3-/5  -AO       Row Name 06/11/25 0855          Balance    Balance Assessment sitting static balance;sitting dynamic balance;standing static balance;standing dynamic balance  -AO     Static Sitting Balance standby assist  -AO     Dynamic Sitting Balance contact guard  -AO     Position, Sitting Balance unsupported;sitting edge of bed  -AO     Static Standing Balance contact guard  -AO     Dynamic Standing Balance minimal assist  -AO     Position/Device Used, Standing Balance supported;walker, rolling  -AO     Balance Interventions weight shifting activity  -AO       Row Name 06/11/25 0855          Sensory Assessment (Somatosensory)    Sensory Assessment (Somatosensory) --  Peripheral neuropathy B feet  -AO               User Key  (r) = Recorded By, (t) = Taken By, (c) = Cosigned By      Initials Name Provider Type    Janis Desir, PT Physical Therapist                   Goals/Plan       Row Name 06/11/25 0855          Bed Mobility Goal 1 (PT)    Activity/Assistive Device (Bed Mobility Goal 1, PT) bed mobility activities, all  -AO     [place] Level/Cues Needed (Bed Mobility Goal 1, PT) modified independence  -AO     Time Frame (Bed Mobility Goal 1, PT) long term goal (LTG);2 weeks  -AO     Progress/Outcomes (Bed Mobility Goal 1, PT) goal not met  -AO       Row Name  06/11/25 0855          Transfer Goal 1 (PT)    Activity/Assistive Device (Transfer Goal 1, PT) transfers, all;walker, rolling  -AO     [place] Level/Cues Needed (Transfer Goal 1, PT) standby assist  -AO     Time Frame (Transfer Goal 1, PT) long term goal (LTG);2 weeks  -AO     Progress/Outcome (Transfer Goal 1, PT) goal not met  -AO       Row Name 06/11/25 0855          Gait Training Goal 1 (PT)    Activity/Assistive Device (Gait Training Goal 1, PT) gait (walking locomotion);walker, rolling  -AO     [place] Level (Gait Training Goal 1, PT) contact guard required  -AO     Distance (Gait Training Goal 1, PT) 25 ft  -AO     Time Frame (Gait Training Goal 1, PT) long term goal (LTG);2 weeks  -AO     Progress/Outcome (Gait Training Goal 1, PT) goal not met  -AO       Row Name 06/11/25 0855          Therapy Assessment/Plan (PT)    Planned Therapy Interventions (PT) balance training;bed mobility training;gait training;home exercise program;neuromuscular re-education;patient/family education;ROM (range of motion);strengthening;transfer training  -AO               User Key  (r) = Recorded By, (t) = Taken By, (c) = Cosigned By      Initials Name Provider Type    AO Janis Araiza, PT Physical Therapist                   Clinical Impression       Row Name 06/11/25 0855          Pain    Pretreatment Pain Rating 10/10  -AO     Posttreatment Pain Rating 10/10  -AO     Pain Location knee  -AO     Pain Side/Orientation bilateral  -AO     Pain Management Interventions nursing notified;positioning techniques utilized  -AO     Response to Pain Interventions activity participation with increased pain  -AO       Row Name 06/11/25 0855          Plan of Care Review    Plan of Care Reviewed With patient  -AO     Progress no change  -AO     Outcome Evaluation Pt is a 66 y/o F who presented to [facility] w/ increased weakness, multiple falls (most recent falling out of recliner 6/10/2025), severe B knee pain/edema (radiographs (-) for  "acute fx/dislocation), and w/ hx of 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 Pt reports she is scheduled for a knee surgery in August 2025, unable to indicate which knee, reports she believes it is a TKA (chart review indicates sx scheduled for 8/17/2025) and that she was required to quit smoking (quit 3 weeks ago) and \"lose 50 lbs.\" At baseline, pt lives alone in an apartment w/ 0 BRISSA, was MOD I w/ short-distance household ambulation w/ Rollator walker, has w/c but wasn't using, no BSC, assist for bathing from paid caregiver, reports 5-10 falls over the past year. Pt had paid caregiver M/W/Th/Fri for 6 hours/day. Pt reports 5-10 falls over the past year and progressive decline in mobility related to severe B knee pain. During eval, pt alert/oriented x 4, required CGA for supine to sit transfer, MOD A for sit to/from stand transfer w/ RW, MIN A for ambulatory transfer 3 ft to recliner w/ RW. Mobility limited by 10/10 B knee pain (RN aware) this date (though with no instability noted, no buckling or hyperextension, etc.) and pt with poor activity tolerance. At this time, recommending SNF at d/c, as pt not safe for home alone and at high risk for continued falls. Plan to see 5x/week at [facility] for progression of mobility. PPE: gloves, mask  -AO       Row Name 06/11/25 0855          Therapy Assessment/Plan (PT)    Rehab Potential (PT) fair  -AO     Criteria for Skilled Interventions Met (PT) yes;meets criteria;skilled treatment is necessary  -AO     Therapy Frequency (PT) 5 times/wk  -AO     Predicted Duration of Therapy Intervention (PT) until d/c  -AO       Row Name 06/11/25 0855          Vital Signs    Recovery Time vitals stable and WNL on RA throughout session  -AO       Row Name 06/11/25 0855          Positioning and Restraints    Pre-Treatment Position in bed  -AO     Post Treatment Position chair  -AO     In Chair notified nsg;reclined;call light within reach;encouraged to call for assist;exit alarm " on;with family/caregiver;RUE elevated;LUE elevated  -AO               User Key  (r) = Recorded By, (t) = Taken By, (c) = Cosigned By      Initials Name Provider Type    Janis Desir, PT Physical Therapist                   Outcome Measures       Row Name 06/11/25 0855 06/11/25 0818       How much help from another person do you currently need...    Turning from your back to your side while in flat bed without using bedrails? 4  -AO 3  -RM    Moving from lying on back to sitting on the side of a flat bed without bedrails? 3  -AO 3  -RM    Moving to and from a bed to a chair (including a wheelchair)? 3  -AO 3  -RM    Standing up from a chair using your arms (e.g., wheelchair, bedside chair)? 2  -AO 3  -RM    Climbing 3-5 steps with a railing? 1  -AO 2  -RM    To walk in hospital room? 2  -AO 2  -RM    AM-PAC 6 Clicks Score (PT) 15  -AO 16  -RM    Highest Level of Mobility Goal Move to Chair/Commode-4  -AO Stand (1 or More Minutes)-5  -RM      Row Name 06/11/25 0855          Functional Assessment    Outcome Measure Options AM-PAC 6 Clicks Basic Mobility (PT)  -AO               User Key  (r) = Recorded By, (t) = Taken By, (c) = Cosigned By      Initials Name Provider Type    Janis Desir, PT Physical Therapist    Sofy Arevalo, RN Registered Nurse                                 Physical Therapy Education       Title: PT OT SLP Therapies (In Progress)       Topic: Physical Therapy (In Progress)       Point: Mobility training (Done)       Learning Progress Summary            Patient Acceptance, E,TB, VU by AO at 6/11/2025 1028                      Point: Home exercise program (Not Started)       Learner Progress:  Not documented in this visit.              Point: Body mechanics (Done)       Learning Progress Summary            Patient Acceptance, E,TB, VU by AO at 6/11/2025 1028                      Point: Precautions (Not Started)       Learner Progress:  Not documented in this visit.                  "             User Key       Initials Effective Dates Name Provider Type Discipline    AO 06/16/21 -  Jnais Araiza, PT Physical Therapist PT                  PT Recommendation and Plan  Planned Therapy Interventions (PT): balance training, bed mobility training, gait training, home exercise program, neuromuscular re-education, patient/family education, ROM (range of motion), strengthening, transfer training  Progress: no change  Outcome Evaluation: Pt is a 66 y/o F who presented to [facility] w/ increased weakness, multiple falls (most recent falling out of recliner 6/10/2025), severe B knee pain/edema (radiographs (-) for acute fx/dislocation), and w/ hx of 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 Pt reports she is scheduled for a knee surgery in August 2025, unable to indicate which knee, reports she believes it is a TKA (chart review indicates sx scheduled for 8/17/2025) and that she was required to quit smoking (quit 3 weeks ago) and \"lose 50 lbs.\" At baseline, pt lives alone in an apartment w/ 0 BRISSA, was MOD I w/ short-distance household ambulation w/ Rollator walker, has w/c but wasn't using, no BSC, assist for bathing from paid caregiver, reports 5-10 falls over the past year. Pt had paid caregiver M/W/Th/Fri for 6 hours/day. Pt reports 5-10 falls over the past year and progressive decline in mobility related to severe B knee pain. During eval, pt alert/oriented x 4, required CGA for supine to sit transfer, MOD A for sit to/from stand transfer w/ RW, MIN A for ambulatory transfer 3 ft to recliner w/ RW. Mobility limited by 10/10 B knee pain (RN aware) this date (though with no instability noted, no buckling or hyperextension, etc.) and pt with poor activity tolerance. At this time, recommending SNF at d/c, as pt not safe for home alone and at high risk for continued falls. Plan to see 5x/week at [facility] for progression of mobility. PPE: gloves, mask     Time Calculation:   PT Evaluation " Complexity  History, PT Evaluation Complexity: 3 or more personal factors and/or comorbidities  Examination of Body Systems (PT Eval Complexity): total of 3 or more elements  Clinical Presentation (PT Evaluation Complexity): evolving  Clinical Decision Making (PT Evaluation Complexity): moderate complexity  Overall Complexity (PT Evaluation Complexity): moderate complexity     PT Charges       Row Name 06/11/25 1030             Time Calculation    Start Time 0855  -AO      Stop Time 0932  -AO      Time Calculation (min) 37 min  -AO      PT Received On 06/11/25  -AO      PT - Next Appointment 06/12/25  -AO      PT Goal Re-Cert Due Date 06/25/25  -AO         Time Calculation- PT    Total Timed Code Minutes- PT 0 minute(s)  -AO                User Key  (r) = Recorded By, (t) = Taken By, (c) = Cosigned By      Initials Name Provider Type    AO Janis Araiza, PT Physical Therapist                  Therapy Charges for Today       Code Description Service Date Service Provider Modifiers Qty    [id number] HC-PT EVAL MOD COMPLEXITY 5 6/11/2025 Janis Araiza, PT  1            PT G-Codes  Outcome Measure Options: AM-PAC 6 Clicks Basic Mobility (PT)  AM-PAC 6 Clicks Score (PT): 15  PT Discharge Summary  Anticipated Discharge Disposition (PT): skilled nursing facility    Janis Araiza PT  6/11/2025

## 2025-06-11 NOTE — PLAN OF CARE
"Goal Outcome Evaluation:  Plan of Care Reviewed With: patient        Progress: no change  Outcome Evaluation: Pt is a 64 y/o F who presented to [facility] w/ increased weakness, multiple falls (most recent falling out of recliner 6/10/2025), severe B knee pain/edema (radiographs (-) for acute fx/dislocation), and w/ hx of 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 Pt reports she is scheduled for a knee surgery in August 2025, unable to indicate which knee, reports she believes it is a TKA (chart review indicates sx scheduled for 8/17/2025) and that she was required to quit smoking (quit 3 weeks ago) and \"lose 50 lbs.\" At baseline, pt lives alone in an apartment w/ 0 BRISSA, was MOD I w/ short-distance household ambulation w/ Rollator walker, has w/c but wasn't using, no BSC, assist for bathing from paid caregiver, reports 5-10 falls over the past year. Pt had paid caregiver M/W/Th/Fri for 6 hours/day. Pt reports 5-10 falls over the past year and progressive decline in mobility related to severe B knee pain. During eval, pt alert/oriented x 4, required CGA for supine to sit transfer, MOD A for sit to/from stand transfer w/ RW, MIN A for ambulatory transfer 3 ft to recliner w/ RW. Mobility limited by 10/10 B knee pain (RN aware) this date (though with no instability noted, no buckling or hyperextension, etc.) and pt with poor activity tolerance. At this time, recommending SNF at d/c, as pt not safe for home alone and at high risk for continued falls. Plan to see 5x/week at [facility] for progression of mobility. PPE: gloves, mask    Anticipated Discharge Disposition (PT): skilled nursing facility                        "

## 2025-06-11 NOTE — THERAPY EVALUATION
Patient Name: Jayda Mclean  : 1960    MRN: [id number]                              Today's Date: 2025       Admit Date: 6/10/2025    Visit Dx:     ICD-10-CM ICD-9-CM   1. Weakness  R53.1 780.79   2. Dehydration  E86.0 276.51   3. Hypomagnesemia  E83.42 275.2     Patient Active Problem List   Diagnosis    Bipolar affective disorder, mixed    Generalized anxiety disorder    Insomnia due to mental disorder    Generalized weakness     Past Medical History:   Diagnosis Date    Anxiety     Bipolar disorder     Depression      Past Surgical History:   Procedure Laterality Date     SECTION      KNEE CARTILAGE SURGERY Right       General Information       Row Name 25 1306          OT Time and Intention    Document Type evaluation  -LS     Mode of Treatment occupational therapy  -       Row Name 25 1306          General Information    Patient Profile Reviewed yes  -LS     Prior Level of Function independent:;dressing;mod assist:;bathing;home management;cooking;cleaning  Pt lives in independent living facility. Caregiver 4x/weelk. She uses a rollator in the apartment, a w/c in community.  -LS     Existing Precautions/Restrictions fall  -LS     Barriers to Rehab previous functional deficit;hearing deficit  -LS       Row Name 25 1306          Living Environment    Current Living Arrangements independent living facility  -LS     People in Home alone  Has paid caregiver M/W//Fri for 6 hours/day  -LS       Row Name 25 1306          Home Main Entrance    Number of Stairs, Main Entrance none  -LS       Row Name 25 1306          Stairs Within Home, Primary    Number of Stairs, Within Home, Primary none  -LS       Row Name 25 1306          Cognition    Orientation Status (Cognition) oriented x 4  -LS       Row Name 25 1306          Safety Issues/Impairments Affecting Functional Mobility    Impairments Affecting Function (Mobility) balance;endurance/activity  tolerance;pain;range of motion (ROM);sensation/sensory awareness;strength  -               User Key  (r) = Recorded By, (t) = Taken By, (c) = Cosigned By      Initials Name Provider Type    Scott Flores OT Occupational Therapist                     Mobility/ADL's       Row Name 06/11/25 1308          Bed Mobility    Comment, (Bed Mobility) In chair upon arrival  -       Row Name 06/11/25 1308          Transfers    Transfers sit-stand transfer  -       Row Name 06/11/25 1308          Sit-Stand Transfer    Sit-Stand [place] (Transfers) moderate assist (50% patient effort)  -     Assistive Device (Sit-Stand Transfers) walker, front-wheeled  -       Row Name 06/11/25 1308          Functional Mobility    Functional Mobility- Comment Attempted marching in place as pt reported unable to ambulate d/t pain. Lost balance and OT assisted with controlled sit to chair.  -     Patient was able to Ambulate yes  -       Row Name 06/11/25 1308          Activities of Daily Living    BADL Assessment/Intervention lower body dressing  -       Row Name 06/11/25 1308          Lower Body Dressing Assessment/Training    [place] Level (Lower Body Dressing) lower body dressing skills;don;pants/bottoms;socks;dependent (less than 25% patient effort)  -               User Key  (r) = Recorded By, (t) = Taken By, (c) = Cosigned By      Initials Name Provider Type    Scott Flores OT Occupational Therapist                   Obj/Interventions       Row Name 06/11/25 1309          Sensory Assessment (Somatosensory)    Sensory Assessment (Somatosensory) UE sensation intact  -       Row Name 06/11/25 1309          Vision Assessment/Intervention    Visual Impairment/Limitations corrective lenses full-time  -       Row Name 06/11/25 1309          Range of Motion Comprehensive    General Range of Motion bilateral upper extremity ROM WFL  -       Row Name 06/11/25 1309          Strength Comprehensive (MMT)     Comment, General Manual Muscle Testing (MMT) Assessment BUEs grossly 4-/5  -LS       Row Name 06/11/25 1309          Balance    Balance Assessment sitting static balance;sitting dynamic balance;standing static balance;standing dynamic balance  -LS     Static Sitting Balance standby assist  -LS     Dynamic Sitting Balance contact guard  -LS     Position, Sitting Balance unsupported  -LS     Static Standing Balance contact guard  -LS     Dynamic Standing Balance minimal assist;moderate assist  -LS     Position/Device Used, Standing Balance walker, front-wheeled  -LS               User Key  (r) = Recorded By, (t) = Taken By, (c) = Cosigned By      Initials Name Provider Type    LS Scott Carson OT Occupational Therapist                   Goals/Plan       Row Name 06/11/25 1322          Bed Mobility Goal 1 (OT)    Activity/Assistive Device (Bed Mobility Goal 1, OT) bed mobility activities, all  -LS     [place] Level/Cues Needed (Bed Mobility Goal 1, OT) modified independence  -LS     Time Frame (Bed Mobility Goal 1, OT) long term goal (LTG);2 weeks  -       Row Name 06/11/25 1322          Transfer Goal 1 (OT)    Activity/Assistive Device (Transfer Goal 1, OT) transfers, all  -LS     [place] Level/Cues Needed (Transfer Goal 1, OT) modified independence  -LS     Time Frame (Transfer Goal 1, OT) long term goal (LTG);2 weeks  -       Row Name 06/11/25 1322          Dressing Goal 1 (OT)    Activity/Device (Dressing Goal 1, OT) dressing skills, all  -LS     [place]/Cues Needed (Dressing Goal 1, OT) modified independence  -LS     Time Frame (Dressing Goal 1, OT) long term goal (LTG);2 weeks  -       Row Name 06/11/25 1322          Toileting Goal 1 (OT)    Activity/Device (Toileting Goal 1, OT) toileting skills, all  -LS     [place] Level/Cues Needed (Toileting Goal 1, OT) modified independence  -LS     Time Frame (Toileting Goal 1, OT) long term goal (LTG);2 weeks  -       Row Name 06/11/25 1322           Therapy Assessment/Plan (OT)    Planned Therapy Interventions (OT) activity tolerance training;BADL retraining;functional balance retraining;IADL retraining;occupation/activity based interventions;ROM/therapeutic exercise;strengthening exercise;transfer/mobility retraining;patient/caregiver education/training;neuromuscular control/coordination retraining  -LS               User Key  (r) = Recorded By, (t) = Taken By, (c) = Cosigned By      Initials Name Provider Type    LS Scott Carson OT Occupational Therapist                   Clinical Impression       Row Name 06/11/25 1311          Pain Assessment    Pretreatment Pain Rating 6/10  -LS     Posttreatment Pain Rating 8/10  -LS     Pain Location knee  -LS     Pain Side/Orientation bilateral  -LS       Row Name 06/11/25 1311          Plan of Care Review    Plan of Care Reviewed With patient  -LS     Outcome Evaluation 65 y.o. female with PMH of DCIS right breast, COPD, HTN, DM type II, bipolar disorder/PTSD, chronic back pain who presented to [facility] ED from [facility] independent living facility 6/10/2025 with reports of 2-3 falls, reportedly due to bilateral knee pain which has caused progressive weakness. Imaging of knees done at [facility] shows mild-moderate joint space narrowing in both knees. Also noted to have BLE swelling. She is scheduled for knee surgery 8/17/25, though she reports she doesn't know what the operation is going to be. At baseline, she lives in an independent living facility where she is IND with functional mobility using a rollator. She uses a w/c for mobility in the community. She is IND with dressing, though she has a caregiver 4 days/week who assists with bathing and all IADLs. She does however admit to  falling several times recently. This date, pt A&Ox4. She is in the chair upon arrival. From chair level, she is Max A-Dependent for lower body self-care including dressing. She requires Mod A to come to standing with RW. She declined  attempts to ambulate secondary to pain, however was willing to attempt marching in place. After marching ~10 seconds, she reported pain in knees to be unbearable and uncontrollably returned to sitting in recliner. OT used gait belt to slow the sit for safety concerns. Currently, pt is far from her baseline level. She is unsafe to return to [facility] in current state, with concerns for safety during mobility and functional task completion. OT will follow, recommending SNF at [place].  -       Row Name 06/11/25 1311          Therapy Assessment/Plan (OT)    Rehab Potential (OT) good  -LS     Criteria for Skilled Therapeutic Interventions Met (OT) yes;skilled treatment is necessary  -     Therapy Frequency (OT) 3 times/wk  -LS     Predicted Duration of Therapy Intervention (OT) until dc  -       Row Name 06/11/25 1311          Therapy Plan Review/Discharge Plan (OT)    Anticipated Discharge Disposition (OT) skilled nursing facility  -       Row Name 06/11/25 1311          Vital Signs    O2 Delivery Pre Treatment room air  -LS     O2 Delivery Intra Treatment room air  -LS     O2 Delivery Post Treatment room air  -LS     Pre Patient Position Sitting  -LS     Intra Patient Position Standing  -LS     Post Patient Position Sitting  -LS       Row Name 06/11/25 1311          Positioning and Restraints    Pre-Treatment Position sitting in chair/recliner  -LS     Post Treatment Position chair  -LS     In Chair notified nsg;reclined;call light within reach;encouraged to call for assist;exit alarm on  -LS               User Key  (r) = Recorded By, (t) = Taken By, (c) = Cosigned By      Initials Name Provider Type    LS Scott Carson OT Occupational Therapist                   Outcome Measures       Row Name 06/11/25 1322          How much help from another is currently needed...    Putting on and taking off regular lower body clothing? 2  -LS     Bathing (including washing, rinsing, and drying) 2  -LS     Toileting (which includes  using toilet bed pan or urinal) 2  -LS     Putting on and taking off regular upper body clothing 3  -LS     Taking care of personal grooming (such as brushing teeth) 3  -LS     Eating meals 4  -LS     AM-PAC 6 Clicks Score (OT) 16  -LS       Row Name 06/11/25 0855 06/11/25 0818       How much help from another person do you currently need...    Turning from your back to your side while in flat bed without using bedrails? 4  -AO 3  -RM    Moving from lying on back to sitting on the side of a flat bed without bedrails? 3  -AO 3  -RM    Moving to and from a bed to a chair (including a wheelchair)? 3  -AO 3  -RM    Standing up from a chair using your arms (e.g., wheelchair, bedside chair)? 2  -AO 3  -RM    Climbing 3-5 steps with a railing? 1  -AO 2  -RM    To walk in hospital room? 2  -AO 2  -RM    AM-PAC 6 Clicks Score (PT) 15  -AO 16  -RM    Highest Level of Mobility Goal Move to Chair/Commode-4  -AO Stand (1 or More Minutes)-5  -RM      Row Name 06/11/25 1322 06/11/25 0855       Functional Assessment    Outcome Measure Options AM-PAC 6 Clicks Daily Activity (OT)  -LS AM-PAC 6 Clicks Basic Mobility (PT)  -AO              User Key  (r) = Recorded By, (t) = Taken By, (c) = Cosigned By      Initials Name Provider Type    AO Janis Araiza, PT Physical Therapist    Sofy Arevalo, RN Registered Nurse    Scott Flores OT Occupational Therapist                    Occupational Therapy Education       Title: PT OT SLP Therapies (In Progress)       Topic: Occupational Therapy (Done)       Point: ADL training (Done)       Learning Progress Summary            Patient Acceptance, E,TB, VU by LS at 6/11/2025 1322                      Point: Precautions (Done)       Learning Progress Summary            Patient Acceptance, E,TB, VU by LS at 6/11/2025 1322                      Point: Body mechanics (Done)       Learning Progress Summary            Patient Acceptance, E,TB, VU by  at 6/11/2025 1322                                       User Key       Initials Effective Dates Name Provider Type Discipline     09/22/22 -  Scott Carson OT Occupational Therapist OT                  OT Recommendation and Plan  Planned Therapy Interventions (OT): activity tolerance training, BADL retraining, functional balance retraining, IADL retraining, occupation/activity based interventions, ROM/therapeutic exercise, strengthening exercise, transfer/mobility retraining, patient/caregiver education/training, neuromuscular control/coordination retraining  Therapy Frequency (OT): 3 times/wk  Plan of Care Review  Plan of Care Reviewed With: patient  Outcome Evaluation: 65 y.o. female with PMH of DCIS right breast, COPD, HTN, DM type II, bipolar disorder/PTSD, chronic back pain who presented to [facility] ED from [facility] independent living facility 6/10/2025 with reports of 2-3 falls, reportedly due to bilateral knee pain which has caused progressive weakness. Imaging of knees done at [facility] shows mild-moderate joint space narrowing in both knees. Also noted to have BLE swelling. She is scheduled for knee surgery 8/17/25, though she reports she doesn't know what the operation is going to be. At baseline, she lives in an independent living facility where she is IND with functional mobility using a rollator. She uses a w/c for mobility in the community. She is IND with dressing, though she has a caregiver 4 days/week who assists with bathing and all IADLs. She does however admit to  falling several times recently. This date, pt A&Ox4. She is in the chair upon arrival. From chair level, she is Max A-Dependent for lower body self-care including dressing. She requires Mod A to come to standing with RW. She declined attempts to ambulate secondary to pain, however was willing to attempt marching in place. After marching ~10 seconds, she reported pain in knees to be unbearable and uncontrollably returned to sitting in recliner. OT used gait belt to slow the sit  for safety concerns. Currently, pt is far from her baseline level. She is unsafe to return to [facility] in current state, with concerns for safety during mobility and functional task completion. OT will follow, recommending SNF at [place].     Time Calculation:         Time Calculation- OT       Row Name 06/11/25 1323             Time Calculation- OT    OT Start Time 0946  -LS      OT Stop Time 1009  -LS      OT Time Calculation (min) 23 min  -LS      OT Received On 06/11/25  -      OT - Next Appointment 06/13/25  -      OT Goal Re-Cert Due Date 06/25/25  -         Untimed Charges    OT Eval/Re-eval Minutes 23  -LS         Total Minutes    Untimed Charges Total Minutes 23  -LS       Total Minutes 23  -LS                User Key  (r) = Recorded By, (t) = Taken By, (c) = Cosigned By      Initials Name Provider Type    Scott Flores OT Occupational Therapist                  Therapy Charges for Today       Code Description Service Date Service Provider Modifiers Qty    09347034053 HC OT EVAL MOD COMPLEXITY 4 6/11/2025 Scott Carson OT GO 1                 Scott Carson OT  6/11/2025

## 2025-06-11 NOTE — NURSING NOTE
Pt /70 at 1640, hydralazine PRN given. Recheck at 179/59 at 1715, MD made aware via securechat with no response. Another recheck at 1758 showing BP at 176/64 at 1758, call out placed to MD.

## 2025-06-11 NOTE — PLAN OF CARE
Goal Outcome Evaluation:      Patient admitted during the shift for increasing weakness and multiple recent falls.  Started on IV fluids.  Magnesium replaced.  Vitals WNL.  2D echo this morning.  PT/OT to see today.

## 2025-06-11 NOTE — PROGRESS NOTES
[facility] MEDICINE SERVICE  DAILY PROGRESS NOTE    NAME: Jayda Mclean  : 1960  MRN: [id number]      LOS: 0 days     PROVIDER OF SERVICE: Jolynn Jean MD    Chief Complaint: Generalized weakness    Subjective:     Interval History:  History taken from: patient    No new complaints        Review of Systems:   Review of Systems   All other systems reviewed and are negative.      Objective:     Vital Signs  Temp:  [97.4 °F (36.3 °C)-98 °F (36.7 °C)] 97.4 °F (36.3 °C)  Heart Rate:  [66-92] 72  Resp:  [8-18] 11  BP: (112-187)/() 159/57   Body mass index is 44.46 kg/m².    Physical Exam  Physical Exam  Constitutional:       Appearance: Normal appearance.   HENT:      Head: Normocephalic and atraumatic.      Nose: Nose normal.      Mouth/Throat:      Mouth: Mucous membranes are moist.   Eyes:      Extraocular Movements: Extraocular movements intact.      Pupils: Pupils are equal, round, and reactive to light.   Cardiovascular:      Rate and Rhythm: Normal rate and regular rhythm.   Pulmonary:      Effort: Pulmonary effort is normal.      Breath sounds: Normal breath sounds.   Abdominal:      General: Abdomen is flat. Bowel sounds are normal.      Palpations: Abdomen is soft.   Musculoskeletal:         General: Swelling present. Normal range of motion.      Cervical back: Normal range of motion and neck supple.      Comments: 1+ bilateral lower extremity edema   Skin:     General: Skin is warm and dry.   Neurological:      General: No focal deficit present.      Mental Status: She is alert and oriented to person, place, and time.   Psychiatric:         Mood and Affect: Mood normal.         Behavior: Behavior normal.         Thought Content: Thought content normal.         Judgment: Judgment normal.         Current Medications:  Scheduled Meds:aspirin, 81 mg, Oral, Daily  atorvastatin, 80 mg, Oral, Daily  budesonide-formoterol, 2 puff, Inhalation, BID - RT  calcium 500 mg vitamin D 5 mcg (200  UT), 1 tablet, Oral, Daily  Diclofenac Sodium, 4 g, Topical, 4x Daily  DULoxetine, 60 mg, Oral, Nightly  fenofibrate, 48 mg, Oral, Daily  gabapentin, 400 mg, Oral, TID  insulin lispro, 2-7 Units, Subcutaneous, 4x Daily AC & at Bedtime  lamoTRIgine, 100 mg, Oral, Nightly  letrozole, 2.5 mg, Oral, Daily  montelukast, 10 mg, Oral, Nightly  OLANZapine, 10 mg, Oral, Nightly  pantoprazole, 40 mg, Oral, Daily  sodium chloride, 10 mL, Intravenous, Q12H      Continuous Infusions:   PRN Meds:.  acetaminophen **OR** acetaminophen **OR** acetaminophen    albuterol    ALPRAZolam    senna-docusate sodium **AND** polyethylene glycol **AND** bisacodyl **AND** bisacodyl    Calcium Replacement - Follow Nurse / BPA Driven Protocol    cetirizine    dextrose    dextrose    dicyclomine    docusate sodium    glucagon (human recombinant)    hydrALAZINE    Magnesium Standard Dose Replacement - Follow Nurse / BPA Driven Protocol    melatonin    miconazole    nitroglycerin    ondansetron ODT **OR** ondansetron    Phosphorus Replacement - Follow Nurse / BPA Driven Protocol    Potassium Replacement - Follow Nurse / BPA Driven Protocol    [COMPLETED] Insert Peripheral IV **AND** sodium chloride    sodium chloride    sodium chloride       Diagnostic Data    Results from last 7 days   Lab Units 06/11/25  0503 06/10/25  1747   WBC 10*3/mm3 6.05 6.86   HEMOGLOBIN g/dL 9.2* 9.1*   HEMATOCRIT % 28.7* 28.5*   PLATELETS 10*3/mm3 384 388   GLUCOSE mg/dL 129* 183*   CREATININE mg/dL 1.14* 1.35*   BUN mg/dL 20.6 21.5   SODIUM mmol/L 131* 133*   POTASSIUM mmol/L 4.5 4.6   AST (SGOT) U/L  --  14   ALT (SGPT) U/L  --  13   ALK PHOS U/L  --  84   BILIRUBIN mg/dL  --  0.2   ANION GAP mmol/L 9.5 13.2       No radiology results for the last day      I reviewed the patient's new clinical results.    Assessment/Plan:     Active and Resolved Problems  Active Hospital Problems    Diagnosis  POA    **Generalized weakness [R53.1]  Yes      Resolved Hospital Problems    No resolved problems to display.       Generalized weakness  Frequent falls  Bilateral lower extremity edema  Acute kidney injury  COPD not in exacerbation  Hypertension  Diabetes mellitus type 2  Bipolar disorder  PTSD      -Patient with frequent falls.  Getting PT.  Recommendation for discharge to subacute rehab/SNF.    -Patient with bilateral lower extremity edema.  Ordered Dopplers to rule out DVT.  proBNP is not elevated.  Echo shows EF of 61 to 65%.  No dyspnea on exertion.  Query venous insufficiency.    - JANUSZ is improving.  Continue to monitor renal function.    - Blood pressure is uncontrolled.  Candesartan which this patient takes at home was held due to JANUSZ.  Start metoprolol.    - Blood glucose is controlled.  Continue current insulin regimen with regard to diabetes mellitus type 2.  Checking hemoglobin A1c level.    - Continue current management        VTE Prophylaxis:  Mechanical VTE prophylaxis orders are present.             Disposition Planning:     Barriers to Discharge: Pending clinical improvement  Anticipated Date of Discharge: 6/15/2025  Place of Discharge: SNF          Code Status and Medical Interventions: CPR (Attempt to Resuscitate); Full Support   Ordered at: 06/10/25 1853     Code Status (Patient has no pulse and is not breathing):    CPR (Attempt to Resuscitate)     Medical Interventions (Patient has pulse or is breathing):    Full Support       Signature: Electronically signed by Jolynn Jean MD, 06/11/25, 14:16 EDT.  [facility] Hospitalist Team

## 2025-06-11 NOTE — DISCHARGE PLACEMENT REQUEST
"Jayda Mclean (65 y.o. Female)       Date of Birth   1960    Social Security Number       Address   [address] [place] [address]    Home Phone   [phone]    MRN   [id number]       Sikhism   Other    Marital Status                               Admission Date   6/10/2025    Admission Type   Emergency    Admitting Provider   Florencio Millan DO    Attending Provider   Jolynn Jean MD    Department, Room/Bed   [facility] 2D, 260/1       Discharge Date       Discharge Disposition       Discharge Destination                                 Attending Provider: Jolynn Jean MD    Allergies: Lithium, Venlafaxine Hcl Er, Latuda [Lurasidone], Vraylar [Cariprazine Hcl]    Isolation: None   Infection: None   Code Status: CPR    Ht: 162.6 cm (64\")   Wt: 118 kg (259 lb 0.7 oz)    Admission Cmt: None   Principal Problem: Generalized weakness [R53.1]                   Active Insurance as of 6/10/2025       Primary Coverage       Payor Plan Insurance Group Employer/Plan Group    ANTHEM MEDICARE REPLACEMENT ANTHEM MEDICARE ADVANTAGE [facility] [id number]       Payor Plan Address Payor Plan Phone Number Payor Plan Fax Number Effective Dates    [address] [phone]  3/1/2025 - None Entered    [place] [phone]         Subscriber Name Subscriber Birth Date Member ID       JAYDA MCLEAN 1960 [id number]               Secondary Coverage       Payor Plan Insurance Group Employer/Plan Group    [place] [employer] [id number]       Payor Plan Address Payor Plan Phone Number Payor Plan Fax Number Effective Dates    [address] [address]   7/1/2024 - None Entered    [facility] [id number]         Subscriber Name Subscriber Birth Date Member ID       JAYDA MCLEAN 1960 [id number]                     Emergency Contacts        (Rel.) Home Phone Work Phone Mobile Phone    JONNIE CORTÉS (Brother) [phone] -- [phone]      "            History & Physical        Casie Mora APRN at 06/10/25 1952       Attestation signed by Maykel Messer MD at 25 9733      I have reviewed this documentation and agree.                          [facility] Medicine Services    Hospitalist History and Physical     Jayda Mclean : 1960 MRN:[id number] LOS:0 ROOM: [facility]/1     Reason for admission: Generalized weakness     Assessment / Plan     Generalized Weakness  Frequent falls  Impaired mobility  Bilateral knee pain/ osteoarthrosis causing all above  Bilateral lower extremity edema (suspected dependent edema from impaired mobility)  - XR's bilateral knees with no acute fractures, osteoarthrosis  - pt awaiting knee surgery  - her knee pain has increased causing decreased mobility and increased falls  - pt states she cannot take hydrocodone any longer because her psychiatrist said she would have her anxiety medication taken away if she is on hydrocodone.   - start voltaren gel   - PT/OT eval, case management, may need placement   - check echo, proBNP (no c/o soa)    Acute kidney injury  Hypomagnesemia  - creatinine 1.35, Mg 1.3  - low rate IVFs for 12 hours started in ER  - electrolyte replacement protocol  - holding NSAIDs overnight, hold ARB, hold metformin     H/o DCIS right breast   - cont home letrozole     COPD  - not in exacerbation  - duonebs prn, symbicort, singulair    Hypertension  - hold home ARB due to JANUSZ above   - prn hydralazine     DM type II  - glucose 183  - hold metformin  - add SSI    Bipolar depression/PTSD  - cont home lamictal, cymbalta, zyprexa, prn xanax       Code Status (Patient has no pulse and is not breathing): CPR (Attempt to Resuscitate)  Medical Interventions (Patient has pulse or is breathing): Full Support       Nutrition:   Diet: Diabetic; Consistent Carbohydrate; Fluid Consistency: Thin (IDDSI 0)     VTE Prophylaxis:  Mechanical VTE prophylaxis orders are present.         History of  "Present illness     Jayda Mclean is a 65 y.o. female with PMH of DCIS right breast, COPD, HTN, DM type II, bipolar disorder/PTSD, chronic back pain who presented to [facility] ED from [facility] living facility 6/10/2025 with reports of 2-3 falls. She stated her knees are \"bone on bone\" and she is scheduled for surgery on her knees August 17th. She is unsure which knee or what type of surgery she is having. She states the pain in her knees continues to worsen, which she feels has caused her falls. She states she feels she is becoming weaker and weaker due to her knee pain. She stated she is off hydrocodone because her psychiatrist told her that she either needs to stop her anxiety medications or stop the hydrocodone because she cannot take both. She has been off the hydrocodone for quite some time and takes ibuprofen 600mg every 8 hours for pain. She was at [facility] yesterday and knee xrays that showed No evidence of dislocation.  No significant joint effusion.  Mild-moderate joint space narrowing is present, worst in the medial compartment in the right knee and worst in the lateral compartment in the left knee. SOFT TISSUES: No radiographic evidence of soft tissue swelling.  No radiopaque foreign body. If the patient has persistent pain, a follow-up radiographic evaluation in 10-14 days, MRI, or three phase bone scan would be recommended to exclude an occult fracture.   She has bilateral lower extremity edema from her knees down and patient stated she was not aware her legs were swollen. She denies any chest pain or shortness of breath or history of Chf.     In the ED here the patient was found to have creatinine 1.35, Mg 1.3, glucose 183, hgb 9.1. UA showed trace leukocytes but otherwise unremarkable. She was given Mg replacement, 500cc LR. She was not able to ambulate in the ER. She was admitted for further treatment of frequent falls, weakness, JANUSZ, hypomagnesemia.       Subjective / Review of " systems     Review of Systems   HENT: Negative.     Eyes: Negative.    Respiratory: Negative.     Cardiovascular: Negative.    Gastrointestinal: Negative.    Genitourinary: Negative.    Musculoskeletal:         Bilateral knee pain    Skin: Negative.    Neurological:  Positive for weakness.        Weakness in bilateral legs due to bilateral knee pain   Psychiatric/Behavioral: Negative.          Past Medical/Surgical/Social/Family History & Allergies     Past Medical History:   Diagnosis Date    Anxiety     Bipolar disorder     Depression       Past Surgical History:   Procedure Laterality Date     SECTION      KNEE CARTILAGE SURGERY Right       Social History     Socioeconomic History    Marital status:    Tobacco Use    Smoking status: Every Day     Current packs/day: 1.50     Types: Cigarettes    Smokeless tobacco: Never   Vaping Use    Vaping status: Never Used   Substance and Sexual Activity    Alcohol use: Not Currently    Drug use: Never    Sexual activity: Defer      Family History   Problem Relation Age of Onset    Anxiety disorder Brother       Allergies   Allergen Reactions    Lithium Hives    Venlafaxine Hcl Er Hives    Latuda [Lurasidone] Hives    Vraylar [Cariprazine Hcl] Dizziness      Social Drivers of Health     Tobacco Use: High Risk (6/10/2025)    Patient History     Smoking Tobacco Use: Every Day     Smokeless Tobacco Use: Never     Passive Exposure: Not on file   Alcohol Use: Not At Risk (6/10/2025)    AUDIT-C     Frequency of Alcohol Consumption: Never     Average Number of Drinks: Patient does not drink     Frequency of Binge Drinking: Never   Financial Resource Strain: Not on file   Food Insecurity: Not on file   Transportation Needs: Not on file   Physical Activity: Not on file   Stress: Not on file   Social Connections: Not on file   Interpersonal Safety: Not At Risk (6/10/2025)    Abuse Screen     Unsafe at Home or Work/School: no     Feels Threatened by Someone?: no      Does Anyone Keep You from Contacting Others or Doint Things Outside the Home?: no     Physical Sign of Abuse Present: no   Depression: At risk (10/27/2020)    PHQ-2     PHQ-2 Score: 4   Housing Stability: Unknown (6/10/2025)    Housing Stability     Current Living Arrangements: home     Potentially Unsafe Housing Conditions: Not on file   Utilities: Not on file   Health Literacy: Not on file   Employment: Not on file   Disabilities: At Risk (6/10/2025)    Disabilities     Concentrating, Remembering, or Making Decisions Difficulty: no     Doing Errands Independently Difficulty: yes        Home Medications     Prior to Admission medications    Medication Sig Start Date End Date Taking? Authorizing Provider   albuterol (PROVENTIL) (2.5 MG/3ML) 0.083% nebulizer solution Take 2.5 mg by nebulization Every 4 (Four) Hours As Needed for Wheezing.   Yes Darnell Knott MD   ALPRAZolam (XANAX) 2 MG tablet Take 1 tablet by mouth 3 (Three) Times a Day As Needed for Anxiety.   Yes Darnell Knott MD   calcium carb-cholecalciferol (Calcium + Vitamin D3) 600-10 MG-MCG tablet per tablet Take 1 tablet by mouth Daily.   Yes Darnell Knott MD   candesartan (ATACAND) 8 MG tablet Take 1 tablet by mouth Daily.   Yes Darnell Knott MD   docusate sodium (COLACE) 100 MG capsule Take 1 capsule by mouth 2 (Two) Times a Day As Needed for Constipation.   Yes Darnell Knott MD   DULoxetine (CYMBALTA) 60 MG capsule Take 1 capsule by mouth Every Night.   Yes Darnell Knott MD   econazole nitrate (SPECTAZOLE) 1 % cream Apply 1 Application topically to the appropriate area as directed Daily As Needed.   Yes Darnell Knott MD   Fluticasone-Salmeterol (Wixela Inhub) 250-50 MCG/ACT DISKUS Inhale 1 puff 2 (Two) Times a Day.   Yes Darnell Knott MD   gabapentin (NEURONTIN) 400 MG capsule Take 1 capsule by mouth 3 (Three) Times a Day.   Yes Darnell Knott MD   ibuprofen (ADVIL,MOTRIN) 600 MG  tablet Take 1 tablet by mouth 2 (Two) Times a Day.   Yes ProviderDarnell MD   lamoTRIgine (LaMICtal) 100 MG tablet Take 1 tablet by mouth Every Night.   Yes ProviderDarnell MD   letrozole (FEMARA) 2.5 MG tablet Take 1 tablet by mouth Daily.   Yes Darnell Knott MD   OLANZapine (zyPREXA) 10 MG tablet Take 1 tablet by mouth Every Night.   Yes Darnell Knott MD   aspirin 81 MG EC tablet Take 1 tablet by mouth Daily. 9/16/16  Yes Darnell Knott MD   atorvastatin (LIPITOR) 80 MG tablet Take 1 tablet by mouth Daily. 7/2/19  Yes Darnell Knott MD   cetirizine (zyrTEC) 10 MG tablet Take 1 tablet by mouth Daily As Needed. 9/5/19  Yes Darnell Knott MD   dicyclomine (BENTYL) 10 MG capsule Take 1 capsule by mouth 4 (Four) Times a Day As Needed. 8/28/19  Yes Darnell Knott MD   fenofibrate (TRICOR) 48 MG tablet Take 1 tablet by mouth Daily. 7/1/19  Yes Darnell Knott MD   fluticasone (FLONASE) 50 MCG/ACT nasal spray Administer 1 spray into the nostril(s) as directed by provider Daily. 8/1/19  Yes Provider, MD Darnell   FREESTYLE LITE test strip USE 1 STRIP DAILY AND PRN 9/5/19   ProviderDarnell MD   metFORMIN ER (GLUCOPHAGE-XR) 500 MG 24 hr tablet Take 2 tablets by mouth 2 (Two) Times a Day. 7/29/19  Yes ProviderDarnell MD   montelukast (SINGULAIR) 10 MG tablet Take 1 tablet by mouth Every Night. 7/3/19  Yes Darnell Knott MD   pantoprazole (PROTONIX) 40 MG EC tablet Take 1 tablet by mouth Daily. 7/1/19  Yes ProviderDarnell MD   PROAIR  (90 Base) MCG/ACT inhaler Inhale 1 puff Every 4 (Four) Hours As Needed. 8/2/19  Yes Darnell Knott MD   ALPRAZolam (XANAX) 1 MG tablet Take 1 tablet by mouth 3 (Three) Times a Day As Needed for Anxiety. 4/26/21 6/10/25  Monet Woody, PA-C   amitriptyline (ELAVIL) 25 MG tablet TAKE 1 TABLET BY MOUTH EVERY DAY AT NIGHT 4/26/21 6/10/25  Monet Woody PA-C   azelastine (ASTELIN) 0.1 % nasal  spray U 2 SPRAYS IEN QD IN THE BARBARA 8/31/19 6/10/25  Darnell Knott MD   Cariprazine HCl (Vraylar) 3 MG capsule capsule Take 1 capsule by mouth Daily. 1/27/21 6/10/25  Monet Woody PA-C    MG capsule Take 1 capsule by mouth 2 (Two) Times a Day As Needed. 7/1/19 6/10/25 Yes Darnell Knott MD   glipiZIDE (GLUCOTROL) 10 MG tablet Take 10 mg by mouth 2 (Two) Times a Day. 7/11/19 6/10/25  Darnell Knott MD   HYDROcodone-acetaminophen (NORCO) 7.5-325 MG per tablet HYDROCODONE-ACETAMINOPHEN 7.5-325 MG TABS 9/16/16 6/10/25  Darnell Knott MD   hydrOXYzine pamoate (VISTARIL) 25 MG capsule Take 1 capsule by mouth 3 (Three) Times a Day As Needed for Anxiety. 9/6/19 6/10/25  Monet Woody PA-C   LOSARTAN POTASSIUM PO LOSARTAN POTASSIUM TABS 9/16/16 6/10/25  Darnell Knott MD        Objective / Physical Exam     Vital signs:  Temp: 97.4 °F (36.3 °C)  BP: (!) 187/73  Heart Rate: 89  Resp: 18  SpO2: 96 %  Weight: 118 kg (259 lb 0.7 oz)    Admission Weight: Weight: 118 kg (260 lb 8 oz)    Physical Exam  Vitals and nursing note reviewed.   Constitutional:       Appearance: She is obese.   HENT:      Head: Normocephalic and atraumatic.   Eyes:      Extraocular Movements: Extraocular movements intact.      Pupils: Pupils are equal, round, and reactive to light.   Cardiovascular:      Rate and Rhythm: Normal rate and regular rhythm.      Pulses: Normal pulses.      Heart sounds: Murmur heard.   Pulmonary:      Effort: Pulmonary effort is normal.      Breath sounds: Normal breath sounds.   Abdominal:      General: Bowel sounds are normal.      Palpations: Abdomen is soft.      Tenderness: There is no abdominal tenderness.   Musculoskeletal:      Right lower leg: Edema present.      Left lower leg: Edema present.      Comments: Patient is unable to move bilateral lower extremities   2+ pitting edema bilateral lower extremities    Skin:     General: Skin is warm and dry.   Neurological:       Mental Status: She is alert and oriented to person, place, and time.   Psychiatric:         Mood and Affect: Mood normal.         Behavior: Behavior normal.          Labs     Results from last 7 days   Lab Units 06/10/25  1747   WBC 10*3/mm3 6.86   HEMOGLOBIN g/dL 9.1*   HEMATOCRIT % 28.5*   PLATELETS 10*3/mm3 388      Results from last 7 days   Lab Units 06/10/25  1747   ALK PHOS U/L 84   AST (SGOT) U/L 14   ALT (SGPT) U/L 13           Results from last 7 days   Lab Units 06/10/25  1747   SODIUM mmol/L 133*   POTASSIUM mmol/L 4.6   CHLORIDE mmol/L 95*   CO2 mmol/L 24.8   BUN mg/dL 21.5   CREATININE mg/dL 1.35*   GLUCOSE mg/dL 183*     Current Medications     Scheduled Meds:  aspirin, 81 mg, Oral, Daily  atorvastatin, 80 mg, Oral, Daily  budesonide-formoterol, 2 puff, Inhalation, BID - RT  [START ON 2025] calcium 500 mg vitamin D 5 mcg (200 UT), 1 tablet, Oral, Daily  Diclofenac Sodium, 4 g, Topical, 4x Daily  DULoxetine, 60 mg, Oral, Nightly  [START ON 2025] fenofibrate, 48 mg, Oral, Daily  gabapentin, 400 mg, Oral, TID  insulin lispro, 2-7 Units, Subcutaneous, 4x Daily AC & at Bedtime  lamoTRIgine, 100 mg, Oral, Nightly  [START ON 2025] letrozole, 2.5 mg, Oral, Daily  magnesium sulfate, 2 g, Intravenous, Q2H  montelukast, 10 mg, Oral, Nightly  OLANZapine, 10 mg, Oral, Nightly  [START ON 2025] pantoprazole, 40 mg, Oral, Daily  sodium chloride, 10 mL, Intravenous, Q12H         Continuous Infusions:  sodium chloride, 100 mL/hr, Last Rate: 100 mL/hr (06/10/25 2132)           Casie Mora [facility] Medicine  06/10/25   22:05 EDT     Electronically signed by Maykel Messer MD at 25 0436       Operative/Procedure Notes (all)    No notes of this type exist for this encounter.          Physician Progress Notes (last 48 hours)        Jolynn Jean MD at 25 1416              [facility] MEDICINE SERVICE  DAILY PROGRESS NOTE    NAME: Jayda Mclean  :  1960  MRN: [id number]      LOS: 0 days     PROVIDER OF SERVICE: Jolynn Jean MD    Chief Complaint: Generalized weakness    Subjective:     Interval History:  History taken from: patient    No new complaints        Review of Systems:   Review of Systems   All other systems reviewed and are negative.      Objective:     Vital Signs  Temp:  [97.4 °F (36.3 °C)-98 °F (36.7 °C)] 97.4 °F (36.3 °C)  Heart Rate:  [66-92] 72  Resp:  [8-18] 11  BP: (112-187)/() 159/57   Body mass index is 44.46 kg/m².    Physical Exam  Physical Exam  Constitutional:       Appearance: Normal appearance.   HENT:      Head: Normocephalic and atraumatic.      Nose: Nose normal.      Mouth/Throat:      Mouth: Mucous membranes are moist.   Eyes:      Extraocular Movements: Extraocular movements intact.      Pupils: Pupils are equal, round, and reactive to light.   Cardiovascular:      Rate and Rhythm: Normal rate and regular rhythm.   Pulmonary:      Effort: Pulmonary effort is normal.      Breath sounds: Normal breath sounds.   Abdominal:      General: Abdomen is flat. Bowel sounds are normal.      Palpations: Abdomen is soft.   Musculoskeletal:         General: Swelling present. Normal range of motion.      Cervical back: Normal range of motion and neck supple.      Comments: 1+ bilateral lower extremity edema   Skin:     General: Skin is warm and dry.   Neurological:      General: No focal deficit present.      Mental Status: She is alert and oriented to person, place, and time.   Psychiatric:         Mood and Affect: Mood normal.         Behavior: Behavior normal.         Thought Content: Thought content normal.         Judgment: Judgment normal.         Current Medications:  Scheduled Meds:aspirin, 81 mg, Oral, Daily  atorvastatin, 80 mg, Oral, Daily  budesonide-formoterol, 2 puff, Inhalation, BID - RT  calcium 500 mg vitamin D 5 mcg (200 UT), 1 tablet, Oral, Daily  Diclofenac Sodium, 4 g, Topical, 4x Daily  DULoxetine, 60 mg,  Oral, Nightly  fenofibrate, 48 mg, Oral, Daily  gabapentin, 400 mg, Oral, TID  insulin lispro, 2-7 Units, Subcutaneous, 4x Daily AC & at Bedtime  lamoTRIgine, 100 mg, Oral, Nightly  letrozole, 2.5 mg, Oral, Daily  montelukast, 10 mg, Oral, Nightly  OLANZapine, 10 mg, Oral, Nightly  pantoprazole, 40 mg, Oral, Daily  sodium chloride, 10 mL, Intravenous, Q12H      Continuous Infusions:   PRN Meds:.  acetaminophen **OR** acetaminophen **OR** acetaminophen    albuterol    ALPRAZolam    senna-docusate sodium **AND** polyethylene glycol **AND** bisacodyl **AND** bisacodyl    Calcium Replacement - Follow Nurse / BPA Driven Protocol    cetirizine    dextrose    dextrose    dicyclomine    docusate sodium    glucagon (human recombinant)    hydrALAZINE    Magnesium Standard Dose Replacement - Follow Nurse / BPA Driven Protocol    melatonin    miconazole    nitroglycerin    ondansetron ODT **OR** ondansetron    Phosphorus Replacement - Follow Nurse / BPA Driven Protocol    Potassium Replacement - Follow Nurse / BPA Driven Protocol    [COMPLETED] Insert Peripheral IV **AND** sodium chloride    sodium chloride    sodium chloride       Diagnostic Data    Results from last 7 days   Lab Units 06/11/25  0503 06/10/25  1747   WBC 10*3/mm3 6.05 6.86   HEMOGLOBIN g/dL 9.2* 9.1*   HEMATOCRIT % 28.7* 28.5*   PLATELETS 10*3/mm3 384 388   GLUCOSE mg/dL 129* 183*   CREATININE mg/dL 1.14* 1.35*   BUN mg/dL 20.6 21.5   SODIUM mmol/L 131* 133*   POTASSIUM mmol/L 4.5 4.6   AST (SGOT) U/L  --  14   ALT (SGPT) U/L  --  13   ALK PHOS U/L  --  84   BILIRUBIN mg/dL  --  0.2   ANION GAP mmol/L 9.5 13.2       No radiology results for the last day      I reviewed the patient's new clinical results.    Assessment/Plan:     Active and Resolved Problems  Active Hospital Problems    Diagnosis  POA    **Generalized weakness [R53.1]  Yes      Resolved Hospital Problems   No resolved problems to display.       Generalized weakness  Frequent falls  Bilateral  lower extremity edema  Acute kidney injury  COPD not in exacerbation  Hypertension  Diabetes mellitus type 2  Bipolar disorder  PTSD      -Patient with frequent falls.  Getting PT.  Recommendation for discharge to subacute rehab/SNF.    -Patient with bilateral lower extremity edema.  Ordered Dopplers to rule out DVT.  proBNP is not elevated.  Echo shows EF of 61 to 65%.  No dyspnea on exertion.  Query venous insufficiency.    - JANUSZ is improving.  Continue to monitor renal function.    - Blood pressure is uncontrolled.  Candesartan which this patient takes at home was held due to JANUSZ.  Start metoprolol.    - Blood glucose is controlled.  Continue current insulin regimen with regard to diabetes mellitus type 2.  Checking hemoglobin A1c level.    - Continue current management        VTE Prophylaxis:  Mechanical VTE prophylaxis orders are present.             Disposition Planning:     Barriers to Discharge: Pending clinical improvement  Anticipated Date of Discharge: 6/15/2025  Place of Discharge: SNF          Code Status and Medical Interventions: CPR (Attempt to Resuscitate); Full Support   Ordered at: 06/10/25 1853     Code Status (Patient has no pulse and is not breathing):    CPR (Attempt to Resuscitate)     Medical Interventions (Patient has pulse or is breathing):    Full Support       Signature: Electronically signed by Jolynn Jean MD, 06/11/25, 14:16 EDT.  [facility] Hospitalist Team     Electronically signed by Jolynn Jean MD at 06/11/25 1427       Jolynn Jean MD at 06/11/25 1345          Patient will require 30 days or less of SNF    Electronically signed by Jolynn Jean MD at 06/11/25 1346       Consult Notes (last 48 hours)  Notes from 06/09/25 1510 through 06/11/25 1510   No notes of this type exist for this encounter.       Nutrition Notes (most recent note)    No notes exist for this encounter.       Speech Language Pathology Notes (most recent note)    No notes exist  for this encounter.       Janis Araiza, PT   Physical Therapist  Physical Therapy  Therapy Evaluation     Signed  Date of Service:  25 103  Creation Time:  25     Signed        Expand All Collapse All  Patient Name: Jayda Mclean                  : 1960                          MRN: [id number]                              Today's Date: 2025                                   Admit Date: 6/10/2025                        Visit Dx:   Visit Diagnosis       ICD-10-CM ICD-9-CM   1. Weakness  R53.1 780.79   2. Dehydration  E86.0 276.51   3. Hypomagnesemia  E83.42 275.2         Problem List       Patient Active Problem List   Diagnosis    Bipolar affective disorder, mixed    Generalized anxiety disorder    Insomnia due to mental disorder    Generalized weakness         Medical History        Past Medical History:   Diagnosis Date    Anxiety      Bipolar disorder      Depression           Surgical History         Past Surgical History:   Procedure Laterality Date     SECTION       KNEE CARTILAGE SURGERY Right             General Information         Row Name 25 0855                 Physical Therapy Time and Intention     Document Type evaluation  -AO       Mode of Treatment physical therapy  -AO          Row Name 25 0855                 General Information     Patient Profile Reviewed yes  -AO       Prior Level of Function --  MOD I w/ short-distance household ambulation w/ Rollator walker, has w/c but wasn't using, no BSC, assist for bathing from paid caregiver, reports 5-10 falls over the past year  -AO       Existing Precautions/Restrictions fall  -AO       Barriers to Rehab previous functional deficit  B knee pain, [place] (awaiting hearing aides, appointment 2025)  -AO          Row Name 25 0855                 Living Environment     Current Living Arrangements apartment  -AO       People in Home alone  Has paid caregiver M/W//Fri for 6 hours/day  -AO           Row Name 06/11/25 0855                 Home Main Entrance     Number of Stairs, Main Entrance none  -AO          Row Name 06/11/25 0855                 Stairs Within Home, Primary     Number of Stairs, Within Home, Primary none  -AO          Row Name 06/11/25 0855                 Cognition     Orientation Status (Cognition) oriented x 4  -AO          Row Name 06/11/25 0855                 Safety Issues/Impairments Affecting Functional Mobility     Safety Issues Affecting Function (Mobility) judgment;positioning of assistive device;problem-solving  -AO       Impairments Affecting Function (Mobility) balance;endurance/activity tolerance;pain;range of motion (ROM);sensation/sensory awareness;strength  -AO                       User Key  (r) = Recorded By, (t) = Taken By, (c) = Cosigned By        Initials Name Provider Type     AO Janis Araiza, PT Physical Therapist                            Mobility         Row Name 06/11/25 0855                 Bed Mobility     Bed Mobility supine-sit  -AO       Supine-Sit [place] (Bed Mobility) contact guard  -AO       Assistive Device (Bed Mobility) bed rails  -AO       Comment, (Bed Mobility) Increased time to complete, difficulty scooting to EOB  -AO          Row Name 06/11/25 0855                 Bed-Chair Transfer     Bed-Chair [place] (Transfers) minimum assist (75% patient effort)  -AO       Assistive Device (Bed-Chair Transfers) walker, front-wheeled  -AO          Row Name 06/11/25 0855                 Sit-Stand Transfer     Sit-Stand [place] (Transfers) moderate assist (50% patient effort)  -AO       Assistive Device (Sit-Stand Transfers) walker, front-wheeled  -AO          Row Name 06/11/25 0855                 Gait/Stairs (Locomotion)     [place] Level (Gait) minimum assist (75% patient effort)  -AO       Assistive Device (Gait) walker, front-wheeled  -AO       Patient was able to Ambulate yes  -AO       Distance in Feet (Gait) 3  to recliner   -AO       Deviations/Abnormal Patterns (Gait) antalgic;christianne decreased;stride length decreased;gait speed decreased  -AO       Bilateral Gait Deviations forward flexed posture;heel strike decreased  -AO          Row Name 06/11/25 0855                 Mobility     Extremity Weight-bearing Status left lower extremity;right lower extremity  -AO       Left Lower Extremity (Weight-bearing Status) weight-bearing as tolerated (WBAT)  -AO       Right Lower Extremity (Weight-bearing Status) weight-bearing as tolerated (WBAT)  -AO                       User Key  (r) = Recorded By, (t) = Taken By, (c) = Cosigned By        Initials Name Provider Type     AO Janis Araiza, PT Physical Therapist                            Obj/Interventions         Row Name 06/11/25 0855                 Range of Motion Comprehensive     General Range of Motion lower extremity range of motion deficits identified  -AO       Comment, General Range of Motion B knee flexion/extension impaired ~25% by pain/weakness  -AO          Row Name 06/11/25 0855                 Strength Comprehensive (MMT)     General Manual Muscle Testing (MMT) Assessment lower extremity strength deficits identified  -AO       Comment, General Manual Muscle Testing (MMT) Assessment B hip flexion: 4-/5, B knee flexion/extension: 3-/5  -AO          Row Name 06/11/25 0855                 Balance     Balance Assessment sitting static balance;sitting dynamic balance;standing static balance;standing dynamic balance  -AO       Static Sitting Balance standby assist  -AO       Dynamic Sitting Balance contact guard  -AO       Position, Sitting Balance unsupported;sitting edge of bed  -AO       Static Standing Balance contact guard  -AO       Dynamic Standing Balance minimal assist  -AO       Position/Device Used, Standing Balance supported;walker, rolling  -AO       Balance Interventions weight shifting activity  -AO          Row Name 06/11/25 0855                 Sensory Assessment  (Somatosensory)     Sensory Assessment (Somatosensory) --  Peripheral neuropathy B feet  -AO                       User Key  (r) = Recorded By, (t) = Taken By, (c) = Cosigned By        Initials Name Provider Type     AO Janis Araiza, PT Physical Therapist                            Goals/Plan         Row Name 06/11/25 0855                 Bed Mobility Goal 1 (PT)     Activity/Assistive Device (Bed Mobility Goal 1, PT) bed mobility activities, all  -AO       [place] Level/Cues Needed (Bed Mobility Goal 1, PT) modified independence  -AO       Time Frame (Bed Mobility Goal 1, PT) long term goal (LTG);2 weeks  -AO       Progress/Outcomes (Bed Mobility Goal 1, PT) goal not met  -AO          Row Name 06/11/25 0855                 Transfer Goal 1 (PT)     Activity/Assistive Device (Transfer Goal 1, PT) transfers, all;walker, rolling  -AO       [place] Level/Cues Needed (Transfer Goal 1, PT) standby assist  -AO       Time Frame (Transfer Goal 1, PT) long term goal (LTG);2 weeks  -AO       Progress/Outcome (Transfer Goal 1, PT) goal not met  -AO          Row Name 06/11/25 0855                 Gait Training Goal 1 (PT)     Activity/Assistive Device (Gait Training Goal 1, PT) gait (walking locomotion);walker, rolling  -AO       [place] Level (Gait Training Goal 1, PT) contact guard required  -AO       Distance (Gait Training Goal 1, PT) 25 ft  -AO       Time Frame (Gait Training Goal 1, PT) long term goal (LTG);2 weeks  -AO       Progress/Outcome (Gait Training Goal 1, PT) goal not met  -AO          Row Name 06/11/25 0855                 Therapy Assessment/Plan (PT)     Planned Therapy Interventions (PT) balance training;bed mobility training;gait training;home exercise program;neuromuscular re-education;patient/family education;ROM (range of motion);strengthening;transfer training  -AO                    User Key  (r) = Recorded By, (t) = Taken By, (c) = Cosigned By        Initials Name Provider Type     AO  "Janis Araiza R, PT Physical Therapist                         Clinical Impression         Row Name 06/11/25 0855                 Pain     Pretreatment Pain Rating 10/10  -AO       Posttreatment Pain Rating 10/10  -AO       Pain Location knee  -AO       Pain Side/Orientation bilateral  -AO       Pain Management Interventions nursing notified;positioning techniques utilized  -AO       Response to Pain Interventions activity participation with increased pain  -AO          Row Name 06/11/25 0855                 Plan of Care Review     Plan of Care Reviewed With patient  -AO       Progress no change  -AO       Outcome Evaluation Pt is a 64 y/o F who presented to [facility] w/ increased weakness, multiple falls (most recent falling out of recliner 6/10/2025), severe B knee pain/edema (radiographs (-) for acute fx/dislocation), and w/ hx of 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 Pt reports she is scheduled for a knee surgery in August 2025, unable to indicate which knee, reports she believes it is a TKA (chart review indicates sx scheduled for 8/17/2025) and that she was required to quit smoking (quit 3 weeks ago) and \"lose 50 lbs.\" At baseline, pt lives alone in an apartment w/ 0 BRISSA, was MOD I w/ short-distance household ambulation w/ Rollator walker, has w/c but wasn't using, no BSC, assist for bathing from paid caregiver, reports 5-10 falls over the past year. Pt had paid caregiver M/W/Th/Fri for 6 hours/day. Pt reports 5-10 falls over the past year and progressive decline in mobility related to severe B knee pain. During eval, pt alert/oriented x 4, required CGA for supine to sit transfer, MOD A for sit to/from stand transfer w/ RW, MIN A for ambulatory transfer 3 ft to recliner w/ RW. Mobility limited by 10/10 B knee pain (RN aware) this date (though with no instability noted, no buckling or hyperextension, etc.) and pt with poor activity tolerance. At this time, recommending SNF at d/c, as pt not safe " for home alone and at high risk for continued falls. Plan to see 5x/week at [facility] for progression of mobility. PPE: gloves, mask  -AO          Row Name 06/11/25 0855                 Therapy Assessment/Plan (PT)     Rehab Potential (PT) fair  -AO       Criteria for Skilled Interventions Met (PT) yes;meets criteria;skilled treatment is necessary  -AO       Therapy Frequency (PT) 5 times/wk  -AO       Predicted Duration of Therapy Intervention (PT) until d/c  -AO          Row Name 06/11/25 0855                 Vital Signs     Recovery Time vitals stable and WNL on RA throughout session  -AO          Row Name 06/11/25 0855                 Positioning and Restraints     Pre-Treatment Position in bed  -AO       Post Treatment Position chair  -AO       In Chair notified nsg;reclined;call light within reach;encouraged to call for assist;exit alarm on;with family/caregiver;RUE elevated;LUE elevated  -AO                       User Key  (r) = Recorded By, (t) = Taken By, (c) = Cosigned By        Initials Name Provider Type     AO Janis Araiza, PT Physical Therapist                            Outcome Measures         Row Name 06/11/25 0855 06/11/25 0818            How much help from another person do you currently need...     Turning from your back to your side while in flat bed without using bedrails? 4  -AO 3  -RM     Moving from lying on back to sitting on the side of a flat bed without bedrails? 3  -AO 3  -RM     Moving to and from a bed to a chair (including a wheelchair)? 3  -AO 3  -RM     Standing up from a chair using your arms (e.g., wheelchair, bedside chair)? 2  -AO 3  -RM     Climbing 3-5 steps with a railing? 1  -AO 2  -RM     To walk in hospital room? 2  -AO 2  -RM     AM-PAC 6 Clicks Score (PT) 15  -AO 16  -RM     Highest Level of Mobility Goal Move to Chair/Commode-4  -AO Stand (1 or More Minutes)-5  -RM        Row Name 06/11/25 0855                 Functional Assessment     Outcome Measure Options AM-PAC 6  "Clicks Basic Mobility (PT)  -AO                       User Key  (r) = Recorded By, (t) = Taken By, (c) = Cosigned By        Initials Name Provider Type     AO Janis Araiza, PT Physical Therapist     Sofy Arevalo, RN Registered Nurse                          Physical Therapy Education            Title: PT OT SLP Therapies (In Progress)         Topic: Physical Therapy (In Progress)         Point: Mobility training (Done)         Learning Progress Summary             Patient Acceptance, E,TB, VU by AO at 6/11/2025 1028                            Point: Home exercise program (Not Started)         Learner Progress:  Not documented in this visit.                  Point: Body mechanics (Done)         Learning Progress Summary             Patient Acceptance, E,TB, VU by AO at 6/11/2025 1028                            Point: Precautions (Not Started)         Learner Progress:  Not documented in this visit.                                      User Key         Initials Effective Dates Name Provider Type Discipline     AO 06/16/21 -  Janis Araiza PT Physical Therapist PT                          PT Recommendation and Plan  Planned Therapy Interventions (PT): balance training, bed mobility training, gait training, home exercise program, neuromuscular re-education, patient/family education, ROM (range of motion), strengthening, transfer training  Progress: no change  Outcome Evaluation: Pt is a 66 y/o F who presented to [facility] w/ increased weakness, multiple falls (most recent falling out of recliner 6/10/2025), severe B knee pain/edema (radiographs (-) for acute fx/dislocation), and w/ hx of 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 Pt reports she is scheduled for a knee surgery in August 2025, unable to indicate which knee, reports she believes it is a TKA (chart review indicates sx scheduled for 8/17/2025) and that she was required to quit smoking (quit 3 weeks ago) and \"lose 50 lbs.\" At baseline, " pt lives alone in an apartment w/ 0 BRISSA, was MOD I w/ short-distance household ambulation w/ Rollator walker, has w/c but wasn't using, no BSC, assist for bathing from paid caregiver, reports 5-10 falls over the past year. Pt had paid caregiver M/W/Th/Fri for 6 hours/day. Pt reports 5-10 falls over the past year and progressive decline in mobility related to severe B knee pain. During eval, pt alert/oriented x 4, required CGA for supine to sit transfer, MOD A for sit to/from stand transfer w/ RW, MIN A for ambulatory transfer 3 ft to recliner w/ RW. Mobility limited by 10/10 B knee pain (RN aware) this date (though with no instability noted, no buckling or hyperextension, etc.) and pt with poor activity tolerance. At this time, recommending SNF at d/c, as pt not safe for home alone and at high risk for continued falls. Plan to see 5x/week at [facility] for progression of mobility. PPE: gloves, mask      Time Calculation:   PT Evaluation Complexity  History, PT Evaluation Complexity: 3 or more personal factors and/or comorbidities  Examination of Body Systems (PT Eval Complexity): total of 3 or more elements  Clinical Presentation (PT Evaluation Complexity): evolving  Clinical Decision Making (PT Evaluation Complexity): moderate complexity  Overall Complexity (PT Evaluation Complexity): moderate complexity       PT Charges         Row Name 06/11/25 1030                       Time Calculation     Start Time 0855  -AO         Stop Time 0932  -AO         Time Calculation (min) 37 min  -AO         PT Received On 06/11/25  -AO         PT - Next Appointment 06/12/25  -AO         PT Goal Re-Cert Due Date 06/25/25  -AO                 Time Calculation- PT     Total Timed Code Minutes- PT 0 minute(s)  -AO                      User Key  (r) = Recorded By, (t) = Taken By, (c) = Cosigned By        Initials Name Provider Type     Janis Desir, PT Physical Therapist                       Therapy Charges for Today         Code  Description Service Date Service Provider Modifiers Qty     [id number] HC-PT EVAL MOD COMPLEXITY 5 2025 Janis Araiza, PT   1                PT G-Codes  Outcome Measure Options: AM-PAC 6 Clicks Basic Mobility (PT)  AM-PAC 6 Clicks Score (PT): 15  PT Discharge Summary  Anticipated Discharge Disposition (PT): skilled nursing facility     Janis Araiza, PT               2025                                    Scott Carson, LIOR   Occupational Therapist  Specialty:  Occupational Therapy  Therapy Evaluation     Signed  Date of Service:  25  Creation Time:  25     Signed        Expand All Collapse All  Patient Name: Jayda Mclean                  : 1960                          MRN: [id number]                              Today's Date: 2025                                   Admit Date: 6/10/2025                        Visit Dx:   Visit Diagnosis       ICD-10-CM ICD-9-CM   1. Weakness  R53.1 780.79   2. Dehydration  E86.0 276.51   3. Hypomagnesemia  E83.42 275.2         Problem List       Patient Active Problem List   Diagnosis    Bipolar affective disorder, mixed    Generalized anxiety disorder    Insomnia due to mental disorder    Generalized weakness         Medical History        Past Medical History:   Diagnosis Date    Anxiety      Bipolar disorder      Depression           Surgical History         Past Surgical History:   Procedure Laterality Date     SECTION   1992    KNEE CARTILAGE SURGERY Right             General Information         Row Name 25 1306                 OT Time and Intention     Document Type evaluation  -LS       Mode of Treatment occupational therapy  -LS          Row Name 25 1306                 General Information     Patient Profile Reviewed yes  -LS       Prior Level of Function independent:;dressing;mod assist:;bathing;home management;cooking;cleaning  Pt lives in independent living facility. Caregiver 4x/weelk. She uses a  rollator in the apartment, a w/c in community.  -       Existing Precautions/Restrictions fall  -       Barriers to Rehab previous functional deficit;hearing deficit  -          Row Name 06/11/25 1306                 Living Environment     Current Living Arrangements independent living facility  -       People in Home alone  Has paid caregiver M/W/Th/Fri for 6 hours/day  -          Row Name 06/11/25 1306                 Home Main Entrance     Number of Stairs, Main Entrance none  -          Row Name 06/11/25 1306                 Stairs Within Home, Primary     Number of Stairs, Within Home, Primary none  -          Row Name 06/11/25 1306                 Cognition     Orientation Status (Cognition) oriented x 4  -          Row Name 06/11/25 1306                 Safety Issues/Impairments Affecting Functional Mobility     Impairments Affecting Function (Mobility) balance;endurance/activity tolerance;pain;range of motion (ROM);sensation/sensory awareness;strength  -                       User Key  (r) = Recorded By, (t) = Taken By, (c) = Cosigned By        Initials Name Provider Type      Scott Carson OT Occupational Therapist                                     Mobility/ADL's         Row Name 06/11/25 1308                 Bed Mobility     Comment, (Bed Mobility) In chair upon arrival  -          Row Name 06/11/25 1308                 Transfers     Transfers sit-stand transfer  -          Row Name 06/11/25 1308                 Sit-Stand Transfer     Sit-Stand [place] (Transfers) moderate assist (50% patient effort)  -       Assistive Device (Sit-Stand Transfers) walker, front-wheeled  -          Row Name 06/11/25 1308                 Functional Mobility     Functional Mobility- Comment Attempted marching in place as pt reported unable to ambulate d/t pain. Lost balance and OT assisted with controlled sit to chair.  -       Patient was able to Ambulate yes  -          Row Name  06/11/25 1308                 Activities of Daily Living     BADL Assessment/Intervention lower body dressing  -LS          Row Name 06/11/25 1308                 Lower Body Dressing Assessment/Training     [place] Level (Lower Body Dressing) lower body dressing skills;don;pants/bottoms;socks;dependent (less than 25% patient effort)  -                       User Key  (r) = Recorded By, (t) = Taken By, (c) = Cosigned By        Initials Name Provider Type      Scott Carson OT Occupational Therapist                            Obj/Interventions         Row Name 06/11/25 1309                 Sensory Assessment (Somatosensory)     Sensory Assessment (Somatosensory) UE sensation intact  -          Row Name 06/11/25 1309                 Vision Assessment/Intervention     Visual Impairment/Limitations corrective lenses full-time  -          Row Name 06/11/25 1309                 Range of Motion Comprehensive     General Range of Motion bilateral upper extremity ROM WFL  -          Row Name 06/11/25 1309                 Strength Comprehensive (MMT)     Comment, General Manual Muscle Testing (MMT) Assessment BUEs grossly 4-/5  -          Row Name 06/11/25 1309                 Balance     Balance Assessment sitting static balance;sitting dynamic balance;standing static balance;standing dynamic balance  -LS       Static Sitting Balance standby assist  -LS       Dynamic Sitting Balance contact guard  -LS       Position, Sitting Balance unsupported  -LS       Static Standing Balance contact guard  -LS       Dynamic Standing Balance minimal assist;moderate assist  -LS       Position/Device Used, Standing Balance walker, front-wheeled  -LS                       User Key  (r) = Recorded By, (t) = Taken By, (c) = Cosigned By        Initials Name Provider Type     LS Scott Carson OT Occupational Therapist                            Goals/Plan         Row Name 06/11/25 1322                 Bed Mobility Goal 1 (OT)      Activity/Assistive Device (Bed Mobility Goal 1, OT) bed mobility activities, all  -LS       [place] Level/Cues Needed (Bed Mobility Goal 1, OT) modified independence  -LS       Time Frame (Bed Mobility Goal 1, OT) long term goal (LTG);2 weeks  -LS          Row Name 06/11/25 1322                 Transfer Goal 1 (OT)     Activity/Assistive Device (Transfer Goal 1, OT) transfers, all  -LS       [place] Level/Cues Needed (Transfer Goal 1, OT) modified independence  -LS       Time Frame (Transfer Goal 1, OT) long term goal (LTG);2 weeks  -LS          Row Name 06/11/25 1322                 Dressing Goal 1 (OT)     Activity/Device (Dressing Goal 1, OT) dressing skills, all  -LS       [place]/Cues Needed (Dressing Goal 1, OT) modified independence  -LS       Time Frame (Dressing Goal 1, OT) long term goal (LTG);2 weeks  -LS          Row Name 06/11/25 1322                 Toileting Goal 1 (OT)     Activity/Device (Toileting Goal 1, OT) toileting skills, all  -LS       [place] Level/Cues Needed (Toileting Goal 1, OT) modified independence  -LS       Time Frame (Toileting Goal 1, OT) long term goal (LTG);2 weeks  -LS          Row Name 06/11/25 1322                 Therapy Assessment/Plan (OT)     Planned Therapy Interventions (OT) activity tolerance training;BADL retraining;functional balance retraining;IADL retraining;occupation/activity based interventions;ROM/therapeutic exercise;strengthening exercise;transfer/mobility retraining;patient/caregiver education/training;neuromuscular control/coordination retraining  -                    User Key  (r) = Recorded By, (t) = Taken By, (c) = Cosigned By        Initials Name Provider Type     LS Scott Carson OT Occupational Therapist                         Clinical Impression         Row Name 06/11/25 1311                 Pain Assessment     Pretreatment Pain Rating 6/10  -LS       Posttreatment Pain Rating 8/10  -LS       Pain Location knee  -LS        Pain Side/Orientation bilateral  -LS          Row Name 06/11/25 1311                 Plan of Care Review     Plan of Care Reviewed With patient  -LS       Outcome Evaluation 65 y.o. female with PMH of DCIS right breast, COPD, HTN, DM type II, bipolar disorder/PTSD, chronic back pain who presented to [facility] ED from [facility] independent living facility 6/10/2025 with reports of 2-3 falls, reportedly due to bilateral knee pain which has caused progressive weakness. Imaging of knees done at [facility] shows mild-moderate joint space narrowing in both knees. Also noted to have BLE swelling. She is scheduled for knee surgery 8/17/25, though she reports she doesn't know what the operation is going to be. At baseline, she lives in an independent living facility where she is IND with functional mobility using a rollator. She uses a w/c for mobility in the community. She is IND with dressing, though she has a caregiver 4 days/week who assists with bathing and all IADLs. She does however admit to  falling several times recently. This date, pt A&Ox4. She is in the chair upon arrival. From chair level, she is Max A-Dependent for lower body self-care including dressing. She requires Mod A to come to standing with RW. She declined attempts to ambulate secondary to pain, however was willing to attempt marching in place. After marching ~10 seconds, she reported pain in knees to be unbearable and uncontrollably returned to sitting in recliner. OT used gait belt to slow the sit for safety concerns. Currently, pt is far from her baseline level. She is unsafe to return to [facility] in current state, with concerns for safety during mobility and functional task completion. OT will [facility]  -LS          Row Name 06/11/25 1311                 Therapy Assessment/Plan (OT)     Rehab Potential (OT) good  -LS       Criteria for Skilled Therapeutic Interventions Met (OT) yes;skilled treatment is necessary  -LS       Therapy  Frequency (OT) 3 times/wk  -LS       Predicted Duration of Therapy Intervention (OT) until dc  -LS          Row Name 06/11/25 1311                 Therapy Plan Review/Discharge Plan (OT)     Anticipated Discharge Disposition (OT) [facility]  -          Row Name 06/11/25 1311                 Vital Signs     O2 Delivery Pre Treatment room air  -LS       O2 Delivery Intra Treatment room air  -LS       O2 Delivery Post Treatment room air  -LS       Pre Patient Position Sitting  -LS       Intra Patient Position Standing  -LS       Post Patient Position Sitting  -LS          Row Name 06/11/25 1311                 Positioning and Restraints     Pre-Treatment Position sitting in chair/recliner  -LS       Post Treatment Position chair  -LS       In Chair notified nsg;reclined;call light within reach;encouraged to call for assist;exit alarm on  -LS                       User Key  (r) = Recorded By, (t) = Taken By, (c) = Cosigned By        Initials Name Provider Type     LS Scott Carson, LIOR Occupational Therapist                            Outcome Measures         Row Name 06/11/25 1322                 How much help from another is currently needed...     Putting on and taking off regular lower body clothing? 2  -LS       Bathing (including washing, rinsing, and drying) 2  -LS       Toileting (which includes using toilet bed pan or urinal) 2  -LS       Putting on and taking off regular upper body clothing 3  -LS       Taking care of personal grooming (such as brushing teeth) 3  -LS       Eating meals 4  -LS       AM-PAC 6 Clicks Score (OT) 16  -LS          Row Name 06/11/25 0855 06/11/25 0818            How much help from another person do you currently need...     Turning from your back to your side while in flat bed without using bedrails? 4  -AO 3  -RM     Moving from lying on back to sitting on the side of a flat bed without bedrails? 3  -AO 3  -RM     Moving to and from a bed to a chair (including a  wheelchair)? 3  -AO 3  -RM     Standing up from a chair using your arms (e.g., wheelchair, bedside chair)? 2  -AO 3  -RM     Climbing 3-5 steps with a railing? 1  -AO 2  -RM     To walk in hospital room? 2  -AO 2  -RM     AM-PAC 6 Clicks Score (PT) 15  -AO 16  -RM     Highest Level of Mobility Goal Move to Chair/Commode-4  -AO Stand (1 or More Minutes)-5  -RM        Row Name 06/11/25 1322 06/11/25 0855            Functional Assessment     Outcome Measure Options AM-PAC 6 Clicks Daily Activity (OT)  -LS AM-PAC 6 Clicks Basic Mobility (PT)  -AO                     User Key  (r) = Recorded By, (t) = Taken By, (c) = Cosigned By        Initials Name Provider Type     AO Janis Araiza, PT Physical Therapist     Sofy Arevalo, RN Registered Nurse     Scott Flores OT Occupational Therapist                             Occupational Therapy Education            Title: PT OT SLP Therapies (In Progress)         Topic: Occupational Therapy (Done)         Point: ADL training (Done)         Learning Progress Summary             Patient Acceptance, E,TB, VU by  at 6/11/2025 1322                            Point: Precautions (Done)         Learning Progress Summary             Patient Acceptance, E,TB, VU by  at 6/11/2025 1322                            Point: Body mechanics (Done)         Learning Progress Summary             Patient Acceptance, E,TB, VU by  at 6/11/2025 1322                                                User Key         Initials Effective Dates Name Provider Type Discipline      09/22/22 -  Scott Carson OT Occupational Therapist OT                          OT Recommendation and Plan  Planned Therapy Interventions (OT): activity tolerance training, BADL retraining, functional balance retraining, IADL retraining, occupation/activity based interventions, ROM/therapeutic exercise, strengthening exercise, transfer/mobility retraining, patient/caregiver education/training, neuromuscular  control/coordination retraining  Therapy Frequency (OT): 3 times/wk  Plan of Care Review  Plan of Care Reviewed With: patient  Outcome Evaluation: 65 y.o. female with PMH of DCIS right breast, COPD, HTN, DM type II, bipolar disorder/PTSD, chronic back pain who presented to [facility] ED from [facility] independent living facility 6/10/2025 with reports of 2-3 falls, reportedly due to bilateral knee pain which has caused progressive weakness. Imaging of knees done at [facility] shows mild-moderate joint space narrowing in both knees. Also noted to have BLE swelling. She is scheduled for knee surgery 8/17/25, though she reports she doesn't know what the operation is going to be. At baseline, she lives in an independent living facility where she is IND with functional mobility using a rollator. She uses a w/c for mobility in the community. She is IND with dressing, though she has a caregiver 4 days/week who assists with bathing and all IADLs. She does however admit to  falling several times recently. This date, pt A&Ox4. She is in the chair upon arrival. From chair level, she is Max A-Dependent for lower body self-care including dressing. She requires Mod A to come to standing with RW. She declined attempts to ambulate secondary to pain, however was willing to attempt marching in place. After marching ~10 seconds, she reported pain in knees to be unbearable and uncontrollably returned to sitting in recliner. OT used gait belt to slow the sit for safety concerns. Currently, pt is far from her baseline level. She is unsafe to return to [facility] in current state, with concerns for safety during mobility and functional task completion. OT will follow, recommending SNF at [place].      Time Calculation:        Time Calculation- OT         Row Name 06/11/25 1323                       Time Calculation- OT     OT Start Time 0946  -LS         OT Stop Time 1009  -LS         OT Time Calculation (min) 23 min  -LS         OT Received On 06/11/25   -LS         OT - Next Appointment 06/13/25  -LS         OT Goal Re-Cert Due Date 06/25/25  -                 Untimed Charges     OT Eval/Re-eval Minutes 23  -LS                 Total Minutes     Untimed Charges Total Minutes 23  -LS          Total Minutes 23  -LS                      User Key  (r) = Recorded By, (t) = Taken By, (c) = Cosigned By        Initials Name Provider Type     Scott Flores OT Occupational Therapist                       Therapy Charges for Today         Code Description Service Date Service Provider Modifiers Qty     57242935390 HC OT EVAL MOD COMPLEXITY 4 6/11/2025 Scott Carson OT GO 1                   Scott Carson OT                  6/11/2025

## 2025-06-11 NOTE — PLAN OF CARE
Goal Outcome Evaluation:              Outcome Evaluation: Pt resting in bed with no complaints at this time. Desiring information on advanced directives, considering a DNR. Provider made aware, refusing advanced care planning team at this time. BP elevated, pt wanting home BP meds restarted, MD made aware, no new orders at this time. Pt up in the chair with therapy. Voltren cream for knees, pt states improvement of symptoms.

## 2025-06-12 VITALS
TEMPERATURE: 97.9 F | DIASTOLIC BLOOD PRESSURE: 82 MMHG | OXYGEN SATURATION: 94 % | WEIGHT: 264.55 LBS | BODY MASS INDEX: 45.17 KG/M2 | HEART RATE: 66 BPM | RESPIRATION RATE: 18 BRPM | SYSTOLIC BLOOD PRESSURE: 173 MMHG | HEIGHT: 64 IN

## 2025-06-12 LAB
ANION GAP SERPL CALCULATED.3IONS-SCNC: 7.5 MMOL/L (ref 5–15)
BASOPHILS # BLD AUTO: 0.04 10*3/MM3 (ref 0–0.2)
BASOPHILS NFR BLD AUTO: 0.6 % (ref 0–1.5)
BUN SERPL-MCNC: 18.1 MG/DL (ref 8–23)
BUN/CREAT SERPL: 17.7 (ref 7–25)
CALCIUM SPEC-SCNC: 8.7 MG/DL (ref 8.6–10.5)
CHLORIDE SERPL-SCNC: 96 MMOL/L (ref 98–107)
CO2 SERPL-SCNC: 26.5 MMOL/L (ref 22–29)
CREAT SERPL-MCNC: 1.02 MG/DL (ref 0.57–1)
DEPRECATED RDW RBC AUTO: 52.1 FL (ref 37–54)
EGFRCR SERPLBLD CKD-EPI 2021: 60.4 ML/MIN/1.73
EOSINOPHIL # BLD AUTO: 0.53 10*3/MM3 (ref 0–0.4)
EOSINOPHIL NFR BLD AUTO: 7.9 % (ref 0.3–6.2)
ERYTHROCYTE [DISTWIDTH] IN BLOOD BY AUTOMATED COUNT: 16.1 % (ref 12.3–15.4)
GLUCOSE BLDC GLUCOMTR-MCNC: 177 MG/DL (ref 70–105)
GLUCOSE BLDC GLUCOMTR-MCNC: 208 MG/DL (ref 70–105)
GLUCOSE SERPL-MCNC: 156 MG/DL (ref 65–99)
HCT VFR BLD AUTO: 27.6 % (ref 34–46.6)
HGB BLD-MCNC: 8.5 G/DL (ref 12–15.9)
IMM GRANULOCYTES # BLD AUTO: 0.03 10*3/MM3 (ref 0–0.05)
IMM GRANULOCYTES NFR BLD AUTO: 0.4 % (ref 0–0.5)
LYMPHOCYTES # BLD AUTO: 1.6 10*3/MM3 (ref 0.7–3.1)
LYMPHOCYTES NFR BLD AUTO: 23.9 % (ref 19.6–45.3)
MAGNESIUM SERPL-MCNC: 1.8 MG/DL (ref 1.6–2.4)
MCH RBC QN AUTO: 27.2 PG (ref 26.6–33)
MCHC RBC AUTO-ENTMCNC: 30.8 G/DL (ref 31.5–35.7)
MCV RBC AUTO: 88.5 FL (ref 79–97)
MONOCYTES # BLD AUTO: 0.46 10*3/MM3 (ref 0.1–0.9)
MONOCYTES NFR BLD AUTO: 6.9 % (ref 5–12)
NEUTROPHILS NFR BLD AUTO: 4.03 10*3/MM3 (ref 1.7–7)
NEUTROPHILS NFR BLD AUTO: 60.3 % (ref 42.7–76)
NRBC BLD AUTO-RTO: 0 /100 WBC (ref 0–0.2)
PLATELET # BLD AUTO: 359 10*3/MM3 (ref 140–450)
PMV BLD AUTO: 9.2 FL (ref 6–12)
POTASSIUM SERPL-SCNC: 5.1 MMOL/L (ref 3.5–5.2)
RBC # BLD AUTO: 3.12 10*6/MM3 (ref 3.77–5.28)
SODIUM SERPL-SCNC: 130 MMOL/L (ref 136–145)
WBC NRBC COR # BLD AUTO: 6.69 10*3/MM3 (ref 3.4–10.8)

## 2025-06-12 PROCEDURE — 94799 UNLISTED PULMONARY SVC/PX: CPT

## 2025-06-12 PROCEDURE — 97535 SELF CARE MNGMENT TRAINING: CPT

## 2025-06-12 PROCEDURE — 80048 BASIC METABOLIC PNL TOTAL CA: CPT

## 2025-06-12 PROCEDURE — 94761 N-INVAS EAR/PLS OXIMETRY MLT: CPT

## 2025-06-12 PROCEDURE — 63710000001 INSULIN LISPRO (HUMAN) PER 5 UNITS: Performed by: NURSE PRACTITIONER

## 2025-06-12 PROCEDURE — 85025 COMPLETE CBC W/AUTO DIFF WBC: CPT

## 2025-06-12 PROCEDURE — 97110 THERAPEUTIC EXERCISES: CPT

## 2025-06-12 PROCEDURE — 94664 DEMO&/EVAL PT USE INHALER: CPT

## 2025-06-12 PROCEDURE — 82948 REAGENT STRIP/BLOOD GLUCOSE: CPT | Performed by: NURSE PRACTITIONER

## 2025-06-12 PROCEDURE — 83735 ASSAY OF MAGNESIUM: CPT

## 2025-06-12 PROCEDURE — 97112 NEUROMUSCULAR REEDUCATION: CPT

## 2025-06-12 PROCEDURE — 25810000003 SODIUM CHLORIDE 0.9 % SOLUTION

## 2025-06-12 PROCEDURE — 97530 THERAPEUTIC ACTIVITIES: CPT

## 2025-06-12 RX ORDER — HYDRALAZINE HYDROCHLORIDE 25 MG/1
25 TABLET, FILM COATED ORAL EVERY 8 HOURS SCHEDULED
Qty: 90 TABLET | Refills: 0 | Status: SHIPPED | OUTPATIENT
Start: 2025-06-12 | End: 2025-07-12

## 2025-06-12 RX ORDER — METOPROLOL TARTRATE 25 MG/1
25 TABLET, FILM COATED ORAL EVERY 12 HOURS SCHEDULED
Qty: 60 TABLET | Refills: 0 | Status: SHIPPED | OUTPATIENT
Start: 2025-06-12 | End: 2025-07-12

## 2025-06-12 RX ADMIN — PANTOPRAZOLE SODIUM 40 MG: 40 TABLET, DELAYED RELEASE ORAL at 08:07

## 2025-06-12 RX ADMIN — GABAPENTIN 400 MG: 400 CAPSULE ORAL at 16:46

## 2025-06-12 RX ADMIN — BUDESONIDE AND FORMOTEROL FUMARATE DIHYDRATE 2 PUFF: 160; 4.5 AEROSOL RESPIRATORY (INHALATION) at 07:46

## 2025-06-12 RX ADMIN — ATORVASTATIN CALCIUM 80 MG: 40 TABLET, FILM COATED ORAL at 08:06

## 2025-06-12 RX ADMIN — INSULIN LISPRO 3 UNITS: 100 INJECTION, SOLUTION INTRAVENOUS; SUBCUTANEOUS at 08:05

## 2025-06-12 RX ADMIN — INSULIN LISPRO 2 UNITS: 100 INJECTION, SOLUTION INTRAVENOUS; SUBCUTANEOUS at 12:19

## 2025-06-12 RX ADMIN — BISACODYL 5 MG: 5 TABLET, COATED ORAL at 08:06

## 2025-06-12 RX ADMIN — FENOFIBRATE 48 MG: 48 TABLET ORAL at 08:06

## 2025-06-12 RX ADMIN — DICLOFENAC SODIUM 4 G: 10 GEL TOPICAL at 08:07

## 2025-06-12 RX ADMIN — METOPROLOL TARTRATE 25 MG: 25 TABLET, FILM COATED ORAL at 08:06

## 2025-06-12 RX ADMIN — HYDRALAZINE HYDROCHLORIDE 25 MG: 25 TABLET ORAL at 13:39

## 2025-06-12 RX ADMIN — Medication 10 ML: at 08:06

## 2025-06-12 RX ADMIN — Medication 1 TABLET: at 08:06

## 2025-06-12 RX ADMIN — ASPIRIN 81 MG: 81 TABLET, COATED ORAL at 08:06

## 2025-06-12 RX ADMIN — GABAPENTIN 400 MG: 400 CAPSULE ORAL at 08:06

## 2025-06-12 RX ADMIN — DICLOFENAC SODIUM 4 G: 10 GEL TOPICAL at 12:19

## 2025-06-12 RX ADMIN — SODIUM CHLORIDE 1000 ML: 9 INJECTION, SOLUTION INTRAVENOUS at 00:11

## 2025-06-12 RX ADMIN — ACETAMINOPHEN 650 MG: 325 TABLET, FILM COATED ORAL at 16:41

## 2025-06-12 NOTE — PLAN OF CARE
"Assessment: Jayda Mclean presents with ADL impairments affecting function including balance, endurance / activity tolerance, pain, and strength. Demonstrated functioning below baseline abilities indicate the need for continued skilled intervention while inpatient. Tolerating session today without incident. Will continue to follow and progress as tolerated.      Plan/Recommendations:   Moderate Intensity Therapy recommended post-acute care. This is recommended as therapy feels the patient would require 3-4 days per week and wouldn't tolerate \"3 hour daily\" rehab intensity. SNF would be the preferred choice. If the patient does not agree to SNF, arrange HH or OP depending on home bound status. If patient is medically complex, consider LTACH.. Pt requires no DME at discharge.      Pt desires Skilled Rehab placement at discharge. Pt cooperative; agreeable to therapeutic recommendations and plan of care.        "

## 2025-06-12 NOTE — PLAN OF CARE
Goal Outcome Evaluation:  Plan of Care Reviewed With: patient        Progress: no change    Jayda Mclean presents with functional mobility impairments which indicate the need for skilled intervention. Tolerating session today without incident and w/ 0/10 pain, however, continues to demonstrate difficulty with transfers with significantly reduced mobility, requiring MOD A for sit to/from stand transfers with RW, MIN A for ambulatory transfer 3 ft to recliner with RW, and with reports of fear of falling. Pt will likely be discharging to SNF this PM. Will continue to follow and progress as tolerated.        Anticipated Discharge Disposition (PT): skilled nursing facility

## 2025-06-12 NOTE — SIGNIFICANT NOTE
Case Management/Social Work    Patient Name:  Jayda Mclean  YOB: 1960  MRN: [id number]  Admit Date:  6/10/2025           06/12/25 1123   Post Acute Pre-Cert Documentation   Date Post Acute Pre-Cert Completed 06/12/25   All Clinicals Submitted? Yes   Response Received from Insurance? Approval   Post Acute Pre-Cert Initiated Comment CME verified SNF precert approval via [employer] portal. Auth ID [id number]. Valid 6/12-6/18. Approval letter indexed to media. CM made aware.           Electronically signed by:  VINCE David  06/12/25 11:25 EDT    Kem Walker  Case Management Extender  [address] [address]  P: [phone]  F: [phone]

## 2025-06-12 NOTE — DISCHARGE SUMMARY
[facility] Medicine Services  Discharge Summary    Date of Service: 2025  Patient Name: Jayda Mclean  : 1960  MRN: [id number]    Date of Admission: 6/10/2025  Discharge Diagnosis:     Generalized weakness  Frequent falls  Bilateral lower extremity edema  Acute kidney injury  COPD not in exacerbation  Hypertension  Diabetes mellitus type 2  Bipolar disorder  PTSD    Date of Discharge: 2025  Primary Care Physician: Alice Damian MD      Hospital Course       Hospital Course:    This patient is a 65-year-old lady who presented with generalized weakness and frequent falls.  CT scan of the head was negative for an acute process.  She was seen by PT and OT and recommendation was for subacute rehab.  She is being discharged to subacute rehab today.    She had mild acute kidney injury which improved after holding her home dose of candesartan.    She also had mild bilateral lower extremity edema.  proBNP was not elevated.  Dopplers were done to rule out DVT and these came back negative for DVT.  An echocardiogram was ordered from the emergency room with normal ejection fraction of 61 to 65%.  She was thought to have mild venous insufficiency causing the lower extremity edema.      DISCHARGE Follow Up Recommendations:-Follow-up PCP in 1 week.      Day of Discharge     Vital Signs:  Temp:  [97.7 °F (36.5 °C)-98.5 °F (36.9 °C)] 97.9 °F (36.6 °C)  Heart Rate:  [52-75] 66  Resp:  [10-20] 18  BP: ()/(33-87) 173/82    Physical Exam:  Physical Exam  Constitutional:       Appearance: Normal appearance.   HENT:      Head: Normocephalic and atraumatic.      Nose: Nose normal.      Mouth/Throat:      Mouth: Mucous membranes are moist.   Eyes:      Extraocular Movements: Extraocular movements intact.      Pupils: Pupils are equal, round, and reactive to light.   Cardiovascular:      Rate and Rhythm: Normal rate and regular rhythm.   Pulmonary:      Effort: Pulmonary effort is normal.       Breath sounds: Normal breath sounds.   Abdominal:      General: Abdomen is flat. Bowel sounds are normal.      Palpations: Abdomen is soft.   Musculoskeletal:         General: Normal range of motion.      Cervical back: Normal range of motion and neck supple.   Skin:     General: Skin is warm and dry.   Neurological:      General: No focal deficit present.      Mental Status: She is alert and oriented to person, place, and time.   Psychiatric:         Mood and Affect: Mood normal.         Behavior: Behavior normal.         Thought Content: Thought content normal.         Judgment: Judgment normal.           Pertinent  and/or Most Recent Results     LAB RESULTS:      Lab 06/12/25  0248 06/11/25  0503 06/10/25  1747   WBC 6.69 6.05 6.86   HEMOGLOBIN 8.5* 9.2* 9.1*   HEMATOCRIT 27.6* 28.7* 28.5*   PLATELETS 359 384 388   NEUTROS ABS 4.03 2.99 4.24   IMMATURE GRANS (ABS) 0.03 0.04 0.03   LYMPHS ABS 1.60 1.88 1.60   MONOS ABS 0.46 0.58 0.60   EOS ABS 0.53* 0.52* 0.36   MCV 88.5 87.8 86.6         Lab 06/12/25  0248 06/11/25  0503 06/10/25  1747   SODIUM 130* 131* 133*   POTASSIUM 5.1 4.5 4.6   CHLORIDE 96* 95* 95*   CO2 26.5 26.5 24.8   ANION GAP 7.5 9.5 13.2   BUN 18.1 20.6 21.5   CREATININE 1.02* 1.14* 1.35*   EGFR 60.4 52.9* 43.2*   GLUCOSE 156* 129* 183*   CALCIUM 8.7 9.4 9.7   MAGNESIUM 1.8 3.3* 1.3*   TSH  --   --  1.260         Lab 06/10/25  1747   TOTAL PROTEIN 5.8*   ALBUMIN 3.8   GLOBULIN 2.0   ALT (SGPT) 13   AST (SGOT) 14   BILIRUBIN 0.2   ALK PHOS 84         Lab 06/10/25  1747   PROBNP 567.0                 Brief Urine Lab Results  (Last result in the past 365 days)        Color   Clarity   Blood   Leuk Est   Nitrite   Protein   CREAT   Urine HCG        06/10/25 1738 Yellow   Clear   Negative   Trace   Negative   30 mg/dL (1+)                 Microbiology Results (last 10 days)       ** No results found for the last 240 hours. **                 Results for orders placed during the hospital encounter of  06/10/25    Duplex Venous Lower Extremity - Bilateral CAR    Interpretation Summary    Normal bilateral lower extremity venous duplex scan.      Results for orders placed during the hospital encounter of 06/10/25    Duplex Venous Lower Extremity - Bilateral CAR    Interpretation Summary    Normal bilateral lower extremity venous duplex scan.      Results for orders placed during the hospital encounter of 06/10/25    Adult Transthoracic Echo Complete W/ Cont if Necessary Per Protocol    Interpretation Summary    Left ventricular systolic function is normal. Left ventricular ejection fraction appears to be 61 - 65%.    Left ventricular diastolic function is consistent with (grade II w/high LAP) pseudonormalization.    The left atrial cavity is mildly dilated.    The right atrial cavity is severely dilated.    Moderate aortic valve stenosis is present.    Peak velocity of the flow distal to the aortic valve is 288.3 cm/s. Aortic valve maximum pressure gradient is 33 mmHg. Aortic valve mean pressure gradient is 21 mmHg.    There is mild, bileaflet mitral valve thickening present.      Labs Pending at Discharge:  Pending Results       None            Procedures Performed           Consults:   Consults       Date and Time Order Name Status Description    6/10/2025  6:29 PM Hospitalist (on-call MD unless specified)                Discharge Details        Discharge Medications        New Medications        Instructions Start Date   hydrALAZINE 25 MG tablet  Commonly known as: APRESOLINE   25 mg, Oral, Every 8 Hours Scheduled      metoprolol tartrate 25 MG tablet  Commonly known as: LOPRESSOR   25 mg, Oral, Every 12 Hours Scheduled             Continue These Medications        Instructions Start Date   ALPRAZolam 2 MG tablet  Commonly known as: XANAX   2 mg, 3 Times Daily PRN      aspirin 81 MG EC tablet   Take 1 tablet by mouth Daily.      atorvastatin 80 MG tablet  Commonly known as: LIPITOR   80 mg, Daily      Calcium +  Vitamin D3 600-10 MG-MCG tablet per tablet  Generic drug: calcium carb-cholecalciferol   1 tablet, Daily      candesartan 8 MG tablet  Commonly known as: ATACAND   8 mg, Daily      cetirizine 10 MG tablet  Commonly known as: zyrTEC   10 mg, Daily PRN      dicyclomine 10 MG capsule  Commonly known as: BENTYL   Take 1 capsule by mouth 4 (Four) Times a Day As Needed.      docusate sodium 100 MG capsule  Commonly known as: COLACE   100 mg, 2 Times Daily PRN      DULoxetine 60 MG capsule  Commonly known as: CYMBALTA   60 mg, Nightly      econazole nitrate 1 % cream  Commonly known as: SPECTAZOLE   1 Application, Daily PRN      fenofibrate 48 MG tablet  Commonly known as: TRICOR   Take 1 tablet by mouth Daily.      fluticasone 50 MCG/ACT nasal spray  Commonly known as: FLONASE   Administer 1 spray into the nostril(s) as directed by provider Daily.      FREESTYLE LITE test strip  Generic drug: glucose blood   USE 1 STRIP DAILY AND PRN      gabapentin 400 MG capsule  Commonly known as: NEURONTIN   400 mg, 3 Times Daily      lamoTRIgine 100 MG tablet  Commonly known as: LaMICtal   100 mg, Nightly      letrozole 2.5 MG tablet  Commonly known as: FEMARA   2.5 mg, Daily      metFORMIN  MG 24 hr tablet  Commonly known as: GLUCOPHAGE-XR   Take 2 tablets by mouth 2 (Two) Times a Day.      montelukast 10 MG tablet  Commonly known as: SINGULAIR   10 mg, Nightly      OLANZapine 10 MG tablet  Commonly known as: zyPREXA   10 mg, Nightly      pantoprazole 40 MG EC tablet  Commonly known as: PROTONIX   Take 1 tablet by mouth Daily.      ProAir  (90 Base) MCG/ACT inhaler  Generic drug: albuterol sulfate HFA   Inhale 1 puff Every 4 (Four) Hours As Needed.      albuterol (2.5 MG/3ML) 0.083% nebulizer solution  Commonly known as: PROVENTIL   2.5 mg, Every 4 Hours PRN      Wixela Inhub 250-50 MCG/ACT DISKUS  Generic drug: Fluticasone-Salmeterol   1 puff, 2 Times Daily - RT             Stop These Medications      ibuprofen 600 MG  tablet  Commonly known as: ADVIL,MOTRIN              Allergies   Allergen Reactions    Lithium Hives    Venlafaxine Hcl Er Hives    Latuda [Lurasidone] Hives    Vraylar [Cariprazine Hcl] Dizziness         Discharge Disposition:   Rehab Facility or Unit (DC - External)    Diet:  Hospital:  Diet Order   Procedures    Diet: Diabetic; Consistent Carbohydrate; Fluid Consistency: Thin (IDDSI 0)         Discharge Activity:   Activity Instructions       Activity as Tolerated                CODE STATUS:  Code Status and Medical Interventions: CPR (Attempt to Resuscitate); Full Support   Ordered at: 06/10/25 3907     Code Status (Patient has no pulse and is not breathing):    CPR (Attempt to Resuscitate)     Medical Interventions (Patient has pulse or is breathing):    Full Support         No future appointments.    Additional Instructions for the Follow-ups that You Need to Schedule       Discharge Follow-up with PCP   As directed       Currently Documented PCP:    Alice Damain MD    PCP Phone Number:    [phone]     Follow Up Details: 1 week                Time spent on Discharge including face to face service: Greater than 30 minutes    Signature: Electronically signed by Jolynn Jean MD, 06/12/25, 15:27 EDT.  [facility] Hospitalist Team

## 2025-06-12 NOTE — THERAPY TREATMENT NOTE
"Subjective: Pt agreeable to therapeutic plan of care.  Cognition: oriented to Person, Place, Time, and Situation    Objective:     Precautions - Falls      Lower Body Dressing: Dependent  ADL Position: supported sitting  ADL Comments: Donning/doffing socks    Grooming: SBA/setup  ADL Position: supported sitting  ADL Comments: Oral care regimen completed from chair level, pt brushing gums and using mouthwash.     Therapeutic Exercise - 10 Reps B UE AROM supported sitting / chair    Vitals: WNL    Pain: 4 VAS Location: Bilateral knees  Interventions for pain: Increased Activity and Therapeutic Presence  Education: Provided education on the importance of mobility in the acute care setting, Verbal/Tactile Cues, ADL training, and Transfer Training    Assessment: Jayda Mclean presents with ADL impairments affecting function including balance, endurance / activity tolerance, pain, and strength. Demonstrated functioning below baseline abilities indicate the need for continued skilled intervention while inpatient. Tolerating session today without incident. Will continue to follow and progress as tolerated.     Plan/Recommendations:   Moderate Intensity Therapy recommended post-acute care. This is recommended as therapy feels the patient would require 3-4 days per week and wouldn't tolerate \"3 hour daily\" rehab intensity. SNF would be the preferred choice. If the patient does not agree to SNF, arrange HH or OP depending on home bound status. If patient is medically complex, consider LTACH.. Pt requires no DME at discharge.     Pt desires Skilled Rehab placement at discharge. Pt cooperative; agreeable to therapeutic recommendations and plan of care.     Modified Joesph: N/A = No pre-op stroke/TIA    Post-Tx Position: Up in Chair, Alarms activated, and Call light and personal items within reach  PPE: gloves    Therapy Charges for Today       Code Description Service Date Service Provider Modifiers Qty    36063439037 HC OT " EVAL MOD COMPLEXITY 4 6/11/2025 Scott Carson OT GO 1    [id number]  OT THER PROC EA 15 MIN 6/12/2025 Scott Carson OT GO 1    45529783908 HC OT SELF CARE/MGMT/TRAIN EA 15 MIN 6/12/2025 Scott Carson OT GO 1           Time Calculation- OT       Row Name 06/12/25 1429             Time Calculation- OT    OT Start Time 1345  -LS      OT Stop Time 1411  -LS      OT Time Calculation (min) 26 min  -LS      Total Timed Code Minutes- OT 26 minute(s)  -LS      OT Received On 06/12/25  -LS      OT - Next Appointment 06/13/25  -         Timed Charges    33502 - OT Therapeutic Exercise Minutes 12  -LS      34269 - OT Self Care/Mgmt Minutes 14  -LS         Total Minutes    Timed Charges Total Minutes 26  -LS       Total Minutes 26  -LS                User Key  (r) = Recorded By, (t) = Taken By, (c) = Cosigned By      Initials Name Provider Type    LS Scott Carson OT Occupational Therapist

## 2025-06-12 NOTE — THERAPY TREATMENT NOTE
"Subjective: Pt agreeable to therapeutic plan of care and reports all of her pain is now gone. Reports her blood pressure dropped to 60s/30s last night and she \"prayed to Dennis\" that she would live and that he would take away her pain. Reports since then, she's had no pain.    Objective:     Precautions - Falls Risk    Bed mobility - SBA (supine to sit)  Transfers - Mod-A, Assist x 2, and with rolling walker (pt completed x 2 sit to/from stand transfers)  Ambulation - 3 feet Min-A and with rolling walker (ambulatory transfer  from EOB to recliner)    Therapeutic Exercise - Declined (pt wanted to finish her lunch sitting up in recliner)    Vitals: WNL (on RA)    Pain: 0 VAS     Education: Provided education on the importance of mobility in the acute care setting, Verbal/Tactile Cues, and Transfer Training    Assessment: Jayda Mclean presents with functional mobility impairments which indicate the need for skilled intervention. Tolerating session today without incident. Will continue to follow and progress as tolerated.     Plan/Recommendations:   If medically appropriate, Moderate Intensity Therapy recommended post-acute care. This is recommended as therapy feels the patient would require 3-4 days per week and wouldn't tolerate \"3 hour daily\" rehab intensity. SNF would be the preferred choice. If the patient does not agree to SNF, arrange HH or OP depending on home bound status. If patient is medically complex, consider LTACH. Pt requires no DME at discharge.     Pt desires Skilled Rehab placement at discharge. Pt cooperative; agreeable to therapeutic recommendations and plan of care.         Basic Mobility 6-click:  Rollin = Total, A lot = 2, A little = 3; 4 = None  Supine>Sit:   1 = Total, A lot = 2, A little = 3; 4 = None   Sit>Stand with arms:  1 = Total, A lot = 2, A little = 3; 4 = None  Bed>Chair:   1 = Total, A lot = 2, A little = 3; 4 = None  Ambulate in room:  1 = Total, A lot = 2, A little = " 3; 4 = None  3-5 Steps with railin = Total, A lot = 2, A little = 3; 4 = None  Score: 17    Post-Tx Position: Up in Chair, Alarms activated, and Call light and personal items within reach  PPE: gloves and surgical mask         PT Charges       Row Name 25 1230             Time Calculation    Start Time 1143  -AO      Stop Time 1204  -AO      Time Calculation (min) 21 min  -AO      PT Received On 25  -AO      PT - Next Appointment 25  -AO         Time Calculation- PT    Total Timed Code Minutes- PT 21 minute(s)  -AO                User Key  (r) = Recorded By, (t) = Taken By, (c) = Cosigned By      Initials Name Provider Type    Janis Desir, PT Physical Therapist

## 2025-06-13 NOTE — CASE MANAGEMENT/SOCIAL WORK
Case Management Discharge Note      Final Note: [facility]    Provided Post Acute Provider List?: Yes  Post Acute Provider List: Nursing Home  Delivered To: Patient  Method of Delivery: In person    Selected Continued Care - Discharged on 6/12/2025 Admission date: 6/10/2025 - Discharge disposition: Rehab Facility or Unit (DC - External)      Destination Coordination complete.      Service Provider Services Address Phone Fax Patient Preferred    [facility] [address] [place] [address] [phone] --                 Transportation Services  W/C Van: Deanna Andujar    Final Discharge Disposition Code: 03 - skilled nursing facility (SNF)

## 2025-07-27 ENCOUNTER — HOSPITAL ENCOUNTER (INPATIENT)
Facility: HOSPITAL | Age: 65
LOS: 4 days | Discharge: HOME | End: 2025-07-31
Attending: EMERGENCY MEDICINE | Admitting: INTERNAL MEDICINE
Payer: MEDICARE

## 2025-07-27 ENCOUNTER — APPOINTMENT (OUTPATIENT)
Dept: CT IMAGING | Facility: HOSPITAL | Age: 65
End: 2025-07-27
Payer: MEDICARE

## 2025-07-27 ENCOUNTER — APPOINTMENT (OUTPATIENT)
Dept: GENERAL RADIOLOGY | Facility: HOSPITAL | Age: 65
End: 2025-07-27
Payer: MEDICARE

## 2025-07-27 DIAGNOSIS — R55 NEAR SYNCOPE: ICD-10-CM

## 2025-07-27 DIAGNOSIS — S80.01XA CONTUSION OF RIGHT KNEE, INITIAL ENCOUNTER: ICD-10-CM

## 2025-07-27 DIAGNOSIS — S00.03XA HEMATOMA OF SCALP, INITIAL ENCOUNTER: ICD-10-CM

## 2025-07-27 DIAGNOSIS — R29.6 MULTIPLE FALLS: ICD-10-CM

## 2025-07-27 DIAGNOSIS — D64.9 ANEMIA, UNSPECIFIED TYPE: ICD-10-CM

## 2025-07-27 DIAGNOSIS — E86.0 DEHYDRATION: ICD-10-CM

## 2025-07-27 DIAGNOSIS — R27.0 ATAXIA: Primary | ICD-10-CM

## 2025-07-27 DIAGNOSIS — E83.42 HYPOMAGNESEMIA: ICD-10-CM
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
## 2025-07-27 LAB
ALBUMIN SERPL-MCNC: 4 G/DL (ref 3.5–5.2)
ALBUMIN/GLOB SERPL: 1.7 G/DL
ALP SERPL-CCNC: 79 U/L (ref 39–117)
ALT SERPL W P-5'-P-CCNC: 17 U/L (ref 1–33)
AMMONIA BLD-SCNC: 21 UMOL/L (ref 11–51)
ANION GAP SERPL CALCULATED.3IONS-SCNC: 13.6 MMOL/L (ref 5–15)
AST SERPL-CCNC: 17 U/L (ref 1–32)
BACTERIA UR QL AUTO: NORMAL /HPF
BASOPHILS # BLD AUTO: 0.03 10*3/MM3 (ref 0–0.2)
BASOPHILS NFR BLD AUTO: 0.3 % (ref 0–1.5)
BILIRUB SERPL-MCNC: 0.3 MG/DL (ref 0–1.2)
BILIRUB UR QL STRIP: NEGATIVE
BUN SERPL-MCNC: 12.3 MG/DL (ref 8–23)
BUN/CREAT SERPL: 15.6 (ref 7–25)
CALCIUM SPEC-SCNC: 9.4 MG/DL (ref 8.6–10.5)
CHLORIDE SERPL-SCNC: 86 MMOL/L (ref 98–107)
CK SERPL-CCNC: 196 U/L (ref 20–180)
CLARITY UR: CLEAR
CO2 SERPL-SCNC: 23.4 MMOL/L (ref 22–29)
COLOR UR: YELLOW
CREAT SERPL-MCNC: 0.79 MG/DL (ref 0.57–1)
DEPRECATED RDW RBC AUTO: 41 FL (ref 37–54)
EGFRCR SERPLBLD CKD-EPI 2021: 82.1 ML/MIN/1.73
EOSINOPHIL # BLD AUTO: 0.33 10*3/MM3 (ref 0–0.4)
EOSINOPHIL NFR BLD AUTO: 3.6 % (ref 0.3–6.2)
ERYTHROCYTE [DISTWIDTH] IN BLOOD BY AUTOMATED COUNT: 13.5 % (ref 12.3–15.4)
GEN 5 1HR TROPONIN T REFLEX: 18 NG/L
GLOBULIN UR ELPH-MCNC: 2.3 GM/DL
GLUCOSE SERPL-MCNC: 135 MG/DL (ref 65–99)
GLUCOSE UR STRIP-MCNC: NEGATIVE MG/DL
HCT VFR BLD AUTO: 32.3 % (ref 34–46.6)
HGB BLD-MCNC: 10.6 G/DL (ref 12–15.9)
HGB UR QL STRIP.AUTO: NEGATIVE
HYALINE CASTS UR QL AUTO: NORMAL /LPF
IMM GRANULOCYTES # BLD AUTO: 0.04 10*3/MM3 (ref 0–0.05)
IMM GRANULOCYTES NFR BLD AUTO: 0.4 % (ref 0–0.5)
KETONES UR QL STRIP: NEGATIVE
LEUKOCYTE ESTERASE UR QL STRIP.AUTO: ABNORMAL
LYMPHOCYTES # BLD AUTO: 1.35 10*3/MM3 (ref 0.7–3.1)
LYMPHOCYTES NFR BLD AUTO: 14.9 % (ref 19.6–45.3)
MAGNESIUM SERPL-MCNC: 1.3 MG/DL (ref 1.6–2.4)
MCH RBC QN AUTO: 27.5 PG (ref 26.6–33)
MCHC RBC AUTO-ENTMCNC: 32.8 G/DL (ref 31.5–35.7)
MCV RBC AUTO: 83.7 FL (ref 79–97)
MONOCYTES # BLD AUTO: 0.59 10*3/MM3 (ref 0.1–0.9)
MONOCYTES NFR BLD AUTO: 6.5 % (ref 5–12)
NEUTROPHILS NFR BLD AUTO: 6.73 10*3/MM3 (ref 1.7–7)
NEUTROPHILS NFR BLD AUTO: 74.3 % (ref 42.7–76)
NITRITE UR QL STRIP: NEGATIVE
NRBC BLD AUTO-RTO: 0 /100 WBC (ref 0–0.2)
PH UR STRIP.AUTO: 6.5 [PH] (ref 5–8)
PLATELET # BLD AUTO: 418 10*3/MM3 (ref 140–450)
PMV BLD AUTO: 8.9 FL (ref 6–12)
POTASSIUM SERPL-SCNC: 3.9 MMOL/L (ref 3.5–5.2)
PROT SERPL-MCNC: 6.3 G/DL (ref 6–8.5)
PROT UR QL STRIP: ABNORMAL
RBC # BLD AUTO: 3.86 10*6/MM3 (ref 3.77–5.28)
RBC # UR STRIP: NORMAL /HPF
REF LAB TEST METHOD: NORMAL
SODIUM SERPL-SCNC: 123 MMOL/L (ref 136–145)
SP GR UR STRIP: <=1.005 (ref 1–1.03)
SQUAMOUS #/AREA URNS HPF: NORMAL /HPF
TROPONIN T % DELTA: 0
TROPONIN T NUMERIC DELTA: 0 NG/L
TROPONIN T SERPL HS-MCNC: 18 NG/L
TSH SERPL DL<=0.05 MIU/L-ACNC: 1.4 UIU/ML (ref 0.27–4.2)
UROBILINOGEN UR QL STRIP: ABNORMAL
WBC # UR STRIP: NORMAL /HPF
WBC NRBC COR # BLD AUTO: 9.07 10*3/MM3 (ref 3.4–10.8)

## 2025-07-27 PROCEDURE — 73562 X-RAY EXAM OF KNEE 3: CPT

## 2025-07-27 PROCEDURE — 84484 ASSAY OF TROPONIN QUANT: CPT | Performed by: EMERGENCY MEDICINE

## 2025-07-27 PROCEDURE — 94640 AIRWAY INHALATION TREATMENT: CPT

## 2025-07-27 PROCEDURE — 94799 UNLISTED PULMONARY SVC/PX: CPT

## 2025-07-27 PROCEDURE — 99285 EMERGENCY DEPT VISIT HI MDM: CPT

## 2025-07-27 PROCEDURE — 85025 COMPLETE CBC W/AUTO DIFF WBC: CPT | Performed by: EMERGENCY MEDICINE

## 2025-07-27 PROCEDURE — 36415 COLL VENOUS BLD VENIPUNCTURE: CPT

## 2025-07-27 PROCEDURE — 93005 ELECTROCARDIOGRAM TRACING: CPT | Performed by: EMERGENCY MEDICINE

## 2025-07-27 PROCEDURE — 82140 ASSAY OF AMMONIA: CPT | Performed by: EMERGENCY MEDICINE

## 2025-07-27 PROCEDURE — 81001 URINALYSIS AUTO W/SCOPE: CPT | Performed by: EMERGENCY MEDICINE

## 2025-07-27 PROCEDURE — 84443 ASSAY THYROID STIM HORMONE: CPT | Performed by: EMERGENCY MEDICINE

## 2025-07-27 PROCEDURE — 80053 COMPREHEN METABOLIC PANEL: CPT | Performed by: EMERGENCY MEDICINE

## 2025-07-27 PROCEDURE — 70450 CT HEAD/BRAIN W/O DYE: CPT

## 2025-07-27 PROCEDURE — 82550 ASSAY OF CK (CPK): CPT | Performed by: EMERGENCY MEDICINE

## 2025-07-27 PROCEDURE — 83735 ASSAY OF MAGNESIUM: CPT | Performed by: EMERGENCY MEDICINE

## 2025-07-27 RX ORDER — POLYETHYLENE GLYCOL 3350 17 G/17G
17 POWDER, FOR SOLUTION ORAL DAILY PRN
Status: DISCONTINUED | OUTPATIENT
Start: 2025-07-27 | End: 2025-07-31 | Stop reason: HOSPADM

## 2025-07-27 RX ORDER — BISACODYL 10 MG
10 SUPPOSITORY, RECTAL RECTAL DAILY PRN
Status: DISCONTINUED | OUTPATIENT
Start: 2025-07-27 | End: 2025-07-31 | Stop reason: HOSPADM

## 2025-07-27 RX ORDER — SODIUM CHLORIDE 0.9 % (FLUSH) 0.9 %
10 SYRINGE (ML) INJECTION EVERY 12 HOURS SCHEDULED
Status: DISCONTINUED | OUTPATIENT
Start: 2025-07-27 | End: 2025-07-31 | Stop reason: HOSPADM

## 2025-07-27 RX ORDER — HYDRALAZINE HYDROCHLORIDE 25 MG/1
25 TABLET, FILM COATED ORAL EVERY 8 HOURS SCHEDULED
Status: DISCONTINUED | OUTPATIENT
Start: 2025-07-27 | End: 2025-07-31

## 2025-07-27 RX ORDER — FENOFIBRATE 48 MG/1
48 TABLET, FILM COATED ORAL DAILY
Status: DISCONTINUED | OUTPATIENT
Start: 2025-07-28 | End: 2025-07-31 | Stop reason: HOSPADM

## 2025-07-27 RX ORDER — OLANZAPINE 5 MG/1
10 TABLET, FILM COATED ORAL NIGHTLY
Status: DISCONTINUED | OUTPATIENT
Start: 2025-07-27 | End: 2025-07-31 | Stop reason: HOSPADM

## 2025-07-27 RX ORDER — SODIUM CHLORIDE 0.9 % (FLUSH) 0.9 %
10 SYRINGE (ML) INJECTION AS NEEDED
Status: DISCONTINUED | OUTPATIENT
Start: 2025-07-27 | End: 2025-07-31 | Stop reason: HOSPADM

## 2025-07-27 RX ORDER — ATORVASTATIN CALCIUM 40 MG/1
80 TABLET, FILM COATED ORAL DAILY
Status: DISCONTINUED | OUTPATIENT
Start: 2025-07-28 | End: 2025-07-31 | Stop reason: HOSPADM

## 2025-07-27 RX ORDER — DULOXETIN HYDROCHLORIDE 30 MG/1
60 CAPSULE, DELAYED RELEASE ORAL NIGHTLY
Status: DISCONTINUED | OUTPATIENT
Start: 2025-07-27 | End: 2025-07-31 | Stop reason: HOSPADM

## 2025-07-27 RX ORDER — ALPRAZOLAM 1 MG/1
2 TABLET ORAL 3 TIMES DAILY PRN
Status: DISCONTINUED | OUTPATIENT
Start: 2025-07-27 | End: 2025-07-31 | Stop reason: HOSPADM

## 2025-07-27 RX ORDER — AMOXICILLIN 250 MG
2 CAPSULE ORAL 2 TIMES DAILY PRN
Status: DISCONTINUED | OUTPATIENT
Start: 2025-07-27 | End: 2025-07-31 | Stop reason: HOSPADM

## 2025-07-27 RX ORDER — LETROZOLE 2.5 MG/1
2.5 TABLET, FILM COATED ORAL DAILY
Status: DISCONTINUED | OUTPATIENT
Start: 2025-07-28 | End: 2025-07-31 | Stop reason: HOSPADM

## 2025-07-27 RX ORDER — ACETAMINOPHEN 325 MG/1
650 TABLET ORAL EVERY 6 HOURS PRN
Status: DISCONTINUED | OUTPATIENT
Start: 2025-07-27 | End: 2025-07-31 | Stop reason: HOSPADM

## 2025-07-27 RX ORDER — PANTOPRAZOLE SODIUM 40 MG/1
40 TABLET, DELAYED RELEASE ORAL DAILY
Status: DISCONTINUED | OUTPATIENT
Start: 2025-07-28 | End: 2025-07-31 | Stop reason: HOSPADM

## 2025-07-27 RX ORDER — BISACODYL 5 MG/1
5 TABLET, DELAYED RELEASE ORAL DAILY PRN
Status: DISCONTINUED | OUTPATIENT
Start: 2025-07-27 | End: 2025-07-31 | Stop reason: HOSPADM

## 2025-07-27 RX ORDER — ALBUTEROL SULFATE 0.83 MG/ML
2.5 SOLUTION RESPIRATORY (INHALATION) EVERY 4 HOURS PRN
Status: DISCONTINUED | OUTPATIENT
Start: 2025-07-27 | End: 2025-07-31 | Stop reason: HOSPADM

## 2025-07-27 RX ORDER — METOPROLOL TARTRATE 25 MG/1
25 TABLET, FILM COATED ORAL EVERY 12 HOURS SCHEDULED
Status: DISCONTINUED | OUTPATIENT
Start: 2025-07-27 | End: 2025-07-31 | Stop reason: HOSPADM

## 2025-07-27 RX ORDER — LAMOTRIGINE 100 MG/1
100 TABLET ORAL NIGHTLY
Status: DISCONTINUED | OUTPATIENT
Start: 2025-07-27 | End: 2025-07-31 | Stop reason: HOSPADM

## 2025-07-27 RX ORDER — VALSARTAN 80 MG/1
40 TABLET ORAL
Status: DISCONTINUED | OUTPATIENT
Start: 2025-07-28 | End: 2025-07-31 | Stop reason: HOSPADM

## 2025-07-27 RX ORDER — MONTELUKAST SODIUM 10 MG/1
10 TABLET ORAL NIGHTLY
Status: DISCONTINUED | OUTPATIENT
Start: 2025-07-27 | End: 2025-07-31 | Stop reason: HOSPADM

## 2025-07-27 RX ORDER — MAGNESIUM SULFATE HEPTAHYDRATE 40 MG/ML
2 INJECTION, SOLUTION INTRAVENOUS ONCE
Status: COMPLETED | OUTPATIENT
Start: 2025-07-27 | End: 2025-07-28

## 2025-07-27 RX ORDER — ASPIRIN 81 MG/1
81 TABLET ORAL DAILY
Status: DISCONTINUED | OUTPATIENT
Start: 2025-07-28 | End: 2025-07-31 | Stop reason: HOSPADM

## 2025-07-27 RX ORDER — ARFORMOTEROL TARTRATE 15 UG/2ML
15 SOLUTION RESPIRATORY (INHALATION)
Status: DISCONTINUED | OUTPATIENT
Start: 2025-07-27 | End: 2025-07-31 | Stop reason: HOSPADM

## 2025-07-27 RX ORDER — BUDESONIDE 0.5 MG/2ML
0.5 INHALANT ORAL
Status: DISCONTINUED | OUTPATIENT
Start: 2025-07-27 | End: 2025-07-31 | Stop reason: HOSPADM

## 2025-07-27 RX ORDER — SODIUM CHLORIDE 9 MG/ML
40 INJECTION, SOLUTION INTRAVENOUS AS NEEDED
Status: DISCONTINUED | OUTPATIENT
Start: 2025-07-27 | End: 2025-07-31 | Stop reason: HOSPADM

## 2025-07-27 RX ORDER — SODIUM CHLORIDE 9 MG/ML
50 INJECTION, SOLUTION INTRAVENOUS CONTINUOUS
Status: DISPENSED | OUTPATIENT
Start: 2025-07-27 | End: 2025-07-28

## 2025-07-27 RX ORDER — NITROGLYCERIN 0.4 MG/1
0.4 TABLET SUBLINGUAL
Status: DISCONTINUED | OUTPATIENT
Start: 2025-07-27 | End: 2025-07-31 | Stop reason: HOSPADM

## 2025-07-27 RX ORDER — GABAPENTIN 400 MG/1
400 CAPSULE ORAL 3 TIMES DAILY
Status: DISCONTINUED | OUTPATIENT
Start: 2025-07-27 | End: 2025-07-31 | Stop reason: HOSPADM

## 2025-07-27 RX ORDER — CETIRIZINE HYDROCHLORIDE 10 MG/1
10 TABLET ORAL NIGHTLY
Status: DISCONTINUED | OUTPATIENT
Start: 2025-07-27 | End: 2025-07-31 | Stop reason: HOSPADM

## 2025-07-27 RX ORDER — METFORMIN HYDROCHLORIDE 500 MG/1
1000 TABLET, EXTENDED RELEASE ORAL 2 TIMES DAILY
Status: DISCONTINUED | OUTPATIENT
Start: 2025-07-27 | End: 2025-07-31 | Stop reason: HOSPADM

## 2025-07-27 RX ADMIN — BUDESONIDE 0.5 MG: 0.5 SUSPENSION RESPIRATORY (INHALATION) at 23:02

## 2025-07-27 RX ADMIN — ARFORMOTEROL TARTRATE 15 MCG: 15 SOLUTION RESPIRATORY (INHALATION) at 22:57

## 2025-07-27 RX ADMIN — ACETAMINOPHEN 650 MG: 325 TABLET, FILM COATED ORAL at 22:55

## 2025-07-27 NOTE — ED NOTES
Patient reports falling 3 times today. Patient states her left knee keeps giving out. Patient hit head on last fall, denies LOC. Patient reports bilateral knee pain and lump on back of head.

## 2025-07-27 NOTE — ED NOTES
Patient placed in falls precautions per nursing judgement. Patient refusing bed alarm. Nonskid socks placed on patient. Yellow falls risk band placed on patient. Falling star on outside of room. Gait belt at bedside. Patient verbalized understanding not to attempt to get up without nursing assistance.

## 2025-07-28 LAB
ANION GAP SERPL CALCULATED.3IONS-SCNC: 9.3 MMOL/L (ref 5–15)
BASOPHILS # BLD AUTO: 0.03 10*3/MM3 (ref 0–0.2)
BASOPHILS NFR BLD AUTO: 0.4 % (ref 0–1.5)
BUN SERPL-MCNC: 10.9 MG/DL (ref 8–23)
BUN/CREAT SERPL: 14.5 (ref 7–25)
CALCIUM SPEC-SCNC: 9.5 MG/DL (ref 8.6–10.5)
CHLORIDE SERPL-SCNC: 86 MMOL/L (ref 98–107)
CO2 SERPL-SCNC: 24.7 MMOL/L (ref 22–29)
CREAT SERPL-MCNC: 0.75 MG/DL (ref 0.57–1)
DEPRECATED RDW RBC AUTO: 41.4 FL (ref 37–54)
EGFRCR SERPLBLD CKD-EPI 2021: 87.4 ML/MIN/1.73
EOSINOPHIL # BLD AUTO: 0.38 10*3/MM3 (ref 0–0.4)
EOSINOPHIL NFR BLD AUTO: 4.6 % (ref 0.3–6.2)
ERYTHROCYTE [DISTWIDTH] IN BLOOD BY AUTOMATED COUNT: 13.6 % (ref 12.3–15.4)
GLUCOSE BLDC GLUCOMTR-MCNC: 107 MG/DL (ref 70–105)
GLUCOSE SERPL-MCNC: 156 MG/DL (ref 65–99)
HCT VFR BLD AUTO: 32.5 % (ref 34–46.6)
HGB BLD-MCNC: 10.7 G/DL (ref 12–15.9)
IMM GRANULOCYTES # BLD AUTO: 0.03 10*3/MM3 (ref 0–0.05)
IMM GRANULOCYTES NFR BLD AUTO: 0.4 % (ref 0–0.5)
LYMPHOCYTES # BLD AUTO: 1.61 10*3/MM3 (ref 0.7–3.1)
LYMPHOCYTES NFR BLD AUTO: 19.3 % (ref 19.6–45.3)
MCH RBC QN AUTO: 27.5 PG (ref 26.6–33)
MCHC RBC AUTO-ENTMCNC: 32.9 G/DL (ref 31.5–35.7)
MCV RBC AUTO: 83.5 FL (ref 79–97)
MONOCYTES # BLD AUTO: 0.5 10*3/MM3 (ref 0.1–0.9)
MONOCYTES NFR BLD AUTO: 6 % (ref 5–12)
NEUTROPHILS NFR BLD AUTO: 5.78 10*3/MM3 (ref 1.7–7)
NEUTROPHILS NFR BLD AUTO: 69.3 % (ref 42.7–76)
NRBC BLD AUTO-RTO: 0 /100 WBC (ref 0–0.2)
OSMOLALITY UR: 123 MOSM/KG (ref 300–800)
PLATELET # BLD AUTO: 408 10*3/MM3 (ref 140–450)
PMV BLD AUTO: 8.9 FL (ref 6–12)
POTASSIUM SERPL-SCNC: 3.9 MMOL/L (ref 3.5–5.2)
QT INTERVAL: 432 MS
QT INTERVAL: 434 MS
QTC INTERVAL: 466 MS
QTC INTERVAL: 605 MS
RBC # BLD AUTO: 3.89 10*6/MM3 (ref 3.77–5.28)
SODIUM SERPL-SCNC: 120 MMOL/L (ref 136–145)
SODIUM SERPL-SCNC: 125 MMOL/L (ref 136–145)
SODIUM UR-SCNC: 25 MMOL/L
WBC NRBC COR # BLD AUTO: 8.33 10*3/MM3 (ref 3.4–10.8)

## 2025-07-28 PROCEDURE — 94799 UNLISTED PULMONARY SVC/PX: CPT

## 2025-07-28 PROCEDURE — 94761 N-INVAS EAR/PLS OXIMETRY MLT: CPT

## 2025-07-28 PROCEDURE — 25810000003 SODIUM CHLORIDE 0.9 % SOLUTION: Performed by: INTERNAL MEDICINE

## 2025-07-28 PROCEDURE — 84300 ASSAY OF URINE SODIUM: CPT | Performed by: STUDENT IN AN ORGANIZED HEALTH CARE EDUCATION/TRAINING PROGRAM

## 2025-07-28 PROCEDURE — 25010000002 MAGNESIUM SULFATE 2 GM/50ML SOLUTION: Performed by: INTERNAL MEDICINE

## 2025-07-28 PROCEDURE — 85025 COMPLETE CBC W/AUTO DIFF WBC: CPT | Performed by: INTERNAL MEDICINE

## 2025-07-28 PROCEDURE — 93010 ELECTROCARDIOGRAM REPORT: CPT | Performed by: INTERNAL MEDICINE

## 2025-07-28 PROCEDURE — 84295 ASSAY OF SERUM SODIUM: CPT | Performed by: INTERNAL MEDICINE

## 2025-07-28 PROCEDURE — 82948 REAGENT STRIP/BLOOD GLUCOSE: CPT

## 2025-07-28 PROCEDURE — 83935 ASSAY OF URINE OSMOLALITY: CPT | Performed by: STUDENT IN AN ORGANIZED HEALTH CARE EDUCATION/TRAINING PROGRAM

## 2025-07-28 PROCEDURE — 25010000002 ENOXAPARIN PER 10 MG: Performed by: STUDENT IN AN ORGANIZED HEALTH CARE EDUCATION/TRAINING PROGRAM

## 2025-07-28 PROCEDURE — 93005 ELECTROCARDIOGRAM TRACING: CPT | Performed by: STUDENT IN AN ORGANIZED HEALTH CARE EDUCATION/TRAINING PROGRAM

## 2025-07-28 PROCEDURE — 80048 BASIC METABOLIC PNL TOTAL CA: CPT | Performed by: INTERNAL MEDICINE

## 2025-07-28 PROCEDURE — 97161 PT EVAL LOW COMPLEX 20 MIN: CPT

## 2025-07-28 PROCEDURE — 97166 OT EVAL MOD COMPLEX 45 MIN: CPT

## 2025-07-28 PROCEDURE — 94664 DEMO&/EVAL PT USE INHALER: CPT

## 2025-07-28 RX ORDER — ENOXAPARIN SODIUM 100 MG/ML
40 INJECTION SUBCUTANEOUS EVERY 12 HOURS
Status: DISCONTINUED | OUTPATIENT
Start: 2025-07-28 | End: 2025-07-31 | Stop reason: HOSPADM

## 2025-07-28 RX ORDER — NYSTATIN 100000 U/G
1 CREAM TOPICAL EVERY 12 HOURS SCHEDULED
Status: DISCONTINUED | OUTPATIENT
Start: 2025-07-28 | End: 2025-07-31 | Stop reason: HOSPADM

## 2025-07-28 RX ADMIN — METOPROLOL TARTRATE 25 MG: 25 TABLET, FILM COATED ORAL at 20:10

## 2025-07-28 RX ADMIN — GABAPENTIN 400 MG: 400 CAPSULE ORAL at 15:08

## 2025-07-28 RX ADMIN — FENOFIBRATE 48 MG: 48 TABLET ORAL at 09:46

## 2025-07-28 RX ADMIN — CETIRIZINE HYDROCHLORIDE 10 MG: 10 TABLET, FILM COATED ORAL at 20:10

## 2025-07-28 RX ADMIN — ACETAMINOPHEN 650 MG: 325 TABLET, FILM COATED ORAL at 18:09

## 2025-07-28 RX ADMIN — Medication 10 ML: at 20:14

## 2025-07-28 RX ADMIN — METOPROLOL TARTRATE 25 MG: 25 TABLET, FILM COATED ORAL at 09:47

## 2025-07-28 RX ADMIN — ASPIRIN 81 MG: 81 TABLET, COATED ORAL at 09:47

## 2025-07-28 RX ADMIN — LAMOTRIGINE 100 MG: 100 TABLET ORAL at 00:05

## 2025-07-28 RX ADMIN — BUDESONIDE 0.5 MG: 0.5 SUSPENSION RESPIRATORY (INHALATION) at 22:42

## 2025-07-28 RX ADMIN — METOPROLOL TARTRATE 25 MG: 25 TABLET, FILM COATED ORAL at 00:06

## 2025-07-28 RX ADMIN — OLANZAPINE 10 MG: 5 TABLET, FILM COATED ORAL at 20:09

## 2025-07-28 RX ADMIN — ALPRAZOLAM 2 MG: 1 TABLET ORAL at 01:09

## 2025-07-28 RX ADMIN — ATORVASTATIN CALCIUM 80 MG: 40 TABLET, FILM COATED ORAL at 09:49

## 2025-07-28 RX ADMIN — METFORMIN HYDROCHLORIDE 1000 MG: 500 TABLET, EXTENDED RELEASE ORAL at 20:09

## 2025-07-28 RX ADMIN — NYSTATIN 1 APPLICATION: 100000 CREAM TOPICAL at 20:14

## 2025-07-28 RX ADMIN — LETROZOLE 2.5 MG: 2.5 TABLET ORAL at 09:56

## 2025-07-28 RX ADMIN — DULOXETINE 60 MG: 30 CAPSULE, DELAYED RELEASE ORAL at 20:10

## 2025-07-28 RX ADMIN — HYDRALAZINE HYDROCHLORIDE 25 MG: 25 TABLET ORAL at 00:07

## 2025-07-28 RX ADMIN — METFORMIN HYDROCHLORIDE 1000 MG: 500 TABLET, EXTENDED RELEASE ORAL at 00:06

## 2025-07-28 RX ADMIN — MAGNESIUM SULFATE HEPTAHYDRATE 2 G: 40 INJECTION, SOLUTION INTRAVENOUS at 00:09

## 2025-07-28 RX ADMIN — ENOXAPARIN SODIUM 40 MG: 100 INJECTION SUBCUTANEOUS at 18:10

## 2025-07-28 RX ADMIN — ALPRAZOLAM 2 MG: 1 TABLET ORAL at 09:54

## 2025-07-28 RX ADMIN — ALPRAZOLAM 2 MG: 1 TABLET ORAL at 20:21

## 2025-07-28 RX ADMIN — OLANZAPINE 10 MG: 5 TABLET, FILM COATED ORAL at 00:05

## 2025-07-28 RX ADMIN — ARFORMOTEROL TARTRATE 15 MCG: 15 SOLUTION RESPIRATORY (INHALATION) at 08:25

## 2025-07-28 RX ADMIN — GABAPENTIN 400 MG: 400 CAPSULE ORAL at 00:06

## 2025-07-28 RX ADMIN — HYDRALAZINE HYDROCHLORIDE 25 MG: 25 TABLET ORAL at 05:01

## 2025-07-28 RX ADMIN — MONTELUKAST 10 MG: 10 TABLET, FILM COATED ORAL at 20:10

## 2025-07-28 RX ADMIN — MONTELUKAST 10 MG: 10 TABLET, FILM COATED ORAL at 00:06

## 2025-07-28 RX ADMIN — VALSARTAN 40 MG: 80 TABLET ORAL at 09:47

## 2025-07-28 RX ADMIN — ACETAMINOPHEN 650 MG: 325 TABLET, FILM COATED ORAL at 05:01

## 2025-07-28 RX ADMIN — ARFORMOTEROL TARTRATE 15 MCG: 15 SOLUTION RESPIRATORY (INHALATION) at 22:35

## 2025-07-28 RX ADMIN — BUDESONIDE 0.5 MG: 0.5 SUSPENSION RESPIRATORY (INHALATION) at 08:29

## 2025-07-28 RX ADMIN — PANTOPRAZOLE SODIUM 40 MG: 40 TABLET, DELAYED RELEASE ORAL at 09:47

## 2025-07-28 RX ADMIN — LAMOTRIGINE 100 MG: 100 TABLET ORAL at 20:09

## 2025-07-28 RX ADMIN — METFORMIN HYDROCHLORIDE 1000 MG: 500 TABLET, EXTENDED RELEASE ORAL at 09:49

## 2025-07-28 RX ADMIN — ACETAMINOPHEN 650 MG: 325 TABLET, FILM COATED ORAL at 11:21

## 2025-07-28 RX ADMIN — GABAPENTIN 400 MG: 400 CAPSULE ORAL at 09:47

## 2025-07-28 RX ADMIN — DULOXETINE 60 MG: 30 CAPSULE, DELAYED RELEASE ORAL at 00:05

## 2025-07-28 RX ADMIN — CETIRIZINE HYDROCHLORIDE 10 MG: 10 TABLET, FILM COATED ORAL at 00:07

## 2025-07-28 RX ADMIN — SODIUM CHLORIDE 50 ML/HR: 9 INJECTION, SOLUTION INTRAVENOUS at 00:09

## 2025-07-28 RX ADMIN — Medication 10 ML: at 00:10

## 2025-07-28 RX ADMIN — GABAPENTIN 400 MG: 400 CAPSULE ORAL at 20:10

## 2025-07-28 RX ADMIN — HYDRALAZINE HYDROCHLORIDE 25 MG: 25 TABLET ORAL at 20:09

## 2025-07-28 NOTE — PROGRESS NOTES
[facility] MEDICINE SERVICE  DAILY PROGRESS NOTE    NAME: Jayda Mclean  : 1960  MRN: [id number]      LOS: 1 day     PROVIDER OF SERVICE: Mago Moralez MD    Chief Complaint: Fall    Subjective:     Interval History:    Patient seen and evaluated at bedside. C/o knee pain worse on right side. Reports multiple falls at home recently. Patient reports she lives alone.     Treatment plan discussed with patient. All questions addressed.     Review of Systems:   Denies fevers, chills  Denies chest pain, edema  Denies shortness of breath, cough  Denies nausea, vomiting, diarrhea  +knee pain, right worse than left    Objective:     Vital Signs  Temp:  [97.3 °F (36.3 °C)-98.2 °F (36.8 °C)] 97.7 °F (36.5 °C)  Heart Rate:  [62-78] 62  Resp:  [11-20] 16  BP: (121-188)/(53-90) 124/64   Body mass index is 42.95 kg/m².    Physical Exam   General: No acute distress, alert and oriented  CV: RRR, + peripheral edema  Pulm: CTAB, no increased work of breathing  Abd: Soft, nontender, nondistended  Skin: No rashes or lesions on exposed skin  Psych: Appropriate mood and affect    Scheduled Meds   budesonide, 0.5 mg, Nebulization, BID - RT   And  arformoterol, 15 mcg, Nebulization, BID - RT  aspirin, 81 mg, Oral, Daily  atorvastatin, 80 mg, Oral, Daily  cetirizine, 10 mg, Oral, Nightly  DULoxetine, 60 mg, Oral, Nightly  enoxaparin sodium, 40 mg, Subcutaneous, Q12H  fenofibrate, 48 mg, Oral, Daily  gabapentin, 400 mg, Oral, TID  hydrALAZINE, 25 mg, Oral, Q8H  lamoTRIgine, 100 mg, Oral, Nightly  letrozole, 2.5 mg, Oral, Daily  metFORMIN ER, 1,000 mg, Oral, BID  metoprolol tartrate, 25 mg, Oral, Q12H  montelukast, 10 mg, Oral, Nightly  nystatin, 1 Application, Topical, Q12H  OLANZapine, 10 mg, Oral, Nightly  pantoprazole, 40 mg, Oral, Daily  sodium chloride, 10 mL, Intravenous, Q12H  valsartan, 40 mg, Oral, Q24H       PRN Meds     acetaminophen    albuterol    ALPRAZolam    senna-docusate sodium **AND** polyethylene glycol  **AND** bisacodyl **AND** bisacodyl    Calcium Replacement - Follow Nurse / BPA Driven Protocol    Magnesium Standard Dose Replacement - Follow Nurse / BPA Driven Protocol    nitroglycerin    Phosphorus Replacement - Follow Nurse / BPA Driven Protocol    Potassium Replacement - Follow Nurse / BPA Driven Protocol    sodium chloride    sodium chloride   Infusions           Diagnostic Data    Results from last 7 days   Lab Units 07/28/25  1621 07/28/25  0115 07/27/25  1956   WBC 10*3/mm3  --  8.33 9.07   HEMOGLOBIN g/dL  --  10.7* 10.6*   HEMATOCRIT %  --  32.5* 32.3*   PLATELETS 10*3/mm3  --  408 418   GLUCOSE mg/dL  --  156* 135*   CREATININE mg/dL  --  0.75 0.79   BUN mg/dL  --  10.9 12.3   SODIUM mmol/L 125* 120* 123*   POTASSIUM mmol/L  --  3.9 3.9   AST (SGOT) U/L  --   --  17   ALT (SGPT) U/L  --   --  17   ALK PHOS U/L  --   --  79   BILIRUBIN mg/dL  --   --  0.3   ANION GAP mmol/L  --  9.3 13.6       CT Head Without Contrast  Result Date: 7/27/2025  Impression: No acute intracranial process. Soft tissue swelling present within the right parietal scalp. Electronically Signed: Maria Elena Del Valle MD  7/27/2025 8:51 PM EDT  Workstation ID: [id number]    XR Knee 3 View Left  Result Date: 7/27/2025  Impression: No acute osseous abnormality. Moderate osteoarthritic changes are present. No joint effusion. Electronically Signed: Maria Elena Del Valle MD  7/27/2025 8:22 PM EDT  Workstation ID: [id number]      Interval results reviewed.    Assessment/Plan:     Generalized weakness  Falls   Osteoarthritis of bilateral lower extremities  - Outpatient ortho f/u  - PT/OT pending    Hyponatremia  - Urine lytes ordered, c/f poor po intake contributing  - Improved with fluid resuscitation  - Repeat BMP in AM     Hypomagnesemia  - Replete, repeat labs in AM    COPD  - Not in exacerbation    HTN  - Home meds    T2DM  - Home meds    BPD/depression/PTSD  - Home medicines as appropriate if Na continues to improve to baseline    Treatment plan  discussed with nursing staff, case management and pharmacy.    VTE Prophylaxis:  Pharmacologic & mechanical VTE prophylaxis orders are present.    Code status is   Code Status and Medical Interventions: CPR (Attempt to Resuscitate); Full Support   Ordered at: 07/27/25 2224     Code Status (Patient has no pulse and is not breathing):    CPR (Attempt to Resuscitate)     Medical Interventions (Patient has pulse or is breathing):    Full Support     Level Of Support Discussed With:    Patient       Plan for disposition: Pending PT/OT recs    Barriers to discharge: PT/OT recs pending, repeat labs pending    Time: 35+ minutes     Signature: Electronically signed by Mago Moralez MD, 07/28/25, 17:39 EDT.  [facility] Hospitalist Team

## 2025-07-28 NOTE — CASE MANAGEMENT/SOCIAL WORK
Discharge Planning Assessment   Mariano     Patient Name: Jayda Mclean  MRN: [id number]  Today's Date: 7/28/2025    Admit Date: 7/27/2025    Plan: DC Plan: Home.  Pending HH choices.  Family will transport   Discharge Needs Assessment       Row Name 07/28/25 1851       Living Environment    People in Home alone    Current Living Arrangements apartment    Potentially Unsafe Housing Conditions none    In the past 12 months has the electric, gas, oil, or water company threatened to shut off services in your home? No    Primary Care Provided by self    Provides Primary Care For no one    Family Caregiver if Needed other (see comments)  Has 30 hr a week caregiver thru [employer] waiver program    Quality of Family Relationships helpful;involved;supportive    Able to Return to Prior Arrangements yes       Resource/Environmental Concerns    Resource/Environmental Concerns none    Transportation Concerns none       Transportation Needs    In the past 12 months, has lack of transportation kept you from medical appointments or from getting medications? no    In the past 12 months, has lack of transportation kept you from meetings, work, or from getting things needed for daily living? No       Food Insecurity    Within the past 12 months, you worried that your food would run out before you got the money to buy more. Never true    Within the past 12 months, the food you bought just didn't last and you didn't have money to get more. Never true       Transition Planning    Patient/Family Anticipates Transition to home with help/services    Patient/Family Anticipated Services at Transition home health care    Transportation Anticipated family or friend will provide       Discharge Needs Assessment    Readmission Within the Last 30 Days no previous admission in last 30 days    Current Outpatient/Agency/Support Group other (see comments)  Has 30hr a week caregiver thru [employer] waiver program.    Equipment Currently Used at Home  nebulizer;rollator;wheelchair;glucometer;shower chair    Concerns to be Addressed care coordination/care conferences;discharge planning    Do you want help finding or keeping work or a job? I do not need or want help    Do you want help with school or training? For example, starting or completing job training or getting a high school diploma, GED or equivalent No    Anticipated Changes Related to Illness none    Equipment Needed After Discharge none    Outpatient/Agency/Support Group Needs homecare agency    Discharge Facility/Level of Care Needs home with home health    Provided Post Acute Provider List? Yes    Post Acute Provider List Nursing Home    Delivered To Patient    Method of Delivery In person                   Discharge Plan       Row Name 07/28/25 9596       Plan    Plan DC Plan: Home.  Pending HH choices.  Family will transport    Patient/Family in Agreement with Plan yes    Plan Comments Patient lives at home alone . Patient family  will transport at discharge.  PCP and pharmacy confirmed. Denies financial assistance needs for medication and/or food. Has 30hr a week caregiver thru [employer] waiver program. Pt has needed DME for home use. Caregiver assist with bathing cooking cleaning and transportation. Pending choices for HH.  Will need to send referral and will need HH order                  Continued Care and Services - Admitted Since 7/27/2025    No active coordination exists.       Selected Continued Care - Prior Encounters Includes continued care and service providers with selected services from prior encounters from 4/28/2025 to 7/28/2025      Discharged on 6/12/2025 Admission date: 6/10/2025 - Discharge disposition: Skilled Nursing Facility (DC - External)      Destination       Service Provider Services Address Phone Fax Patient Preferred    [facility] [place] [address] [phone] --                          Expected Discharge Date and  Time       Expected Discharge Date Expected Discharge Time    Jul 30, 2025            Demographic Summary       Row Name 07/28/25 1850       General Information    Admission Type inpatient    Arrived From emergency department    Required Notices Provided Important Message from Medicare    Referral Source admission list    Reason for Consult discharge planning    Preferred Language English       Contact Information    Permission Granted to Share Info With                    Functional Status       Row Name 07/28/25 1851       Functional Status    Usual Activity Tolerance moderate    Current Activity Tolerance moderate       Functional Status, IADL    Medications assistive person    Meal Preparation independent    Housekeeping independent    Laundry independent    Shopping assistive person    If for any reason you need help with day-to-day activities such as bathing, preparing meals, shopping, managing finances, etc., do you get the help you need? I get all the help I need       Mental Status    General Appearance WDL WDL       Mental Status Summary    Recent Changes in Mental Status/Cognitive Functioning no changes                    Senait Acosta RN    SIPS 1  [email or web address]  Office [phone]  Cell [phone]

## 2025-07-28 NOTE — PLAN OF CARE
Goal Outcome Evaluation:   Patient resting most of day, up in chair, patient pain treated per MAR, able to make needs known, care plan ongoing

## 2025-07-28 NOTE — PLAN OF CARE
Goal Outcome Evaluation:  Plan of Care Reviewed With: patient           Outcome Evaluation: Pt is a 64 y/o female admitted to [facility] on 7/27/25 with c/o mechanical fall and striking her head. XR knee (-) acute osseous abnormality. CT head: (-) acute intracranial process. PMHx significant for DCIS right breast, COPD, HTN, DM type II, bipolar disorder/PTSD, and chronic back pain. At baseline, pt resides alone in 1st floor apt with 0 BRISSA. She reports a caregiver comes mon-fri 8am-4pm/12:30-4pm who assists with transportation, IADLs, showers, and LB dressing. Pt uses a rollator within apt and w/c within community. Upon assessment, pt A&O x4 with 6/10 pain in bilateral knees. She reports 10-15 falls within the past x3 months. Pt sitting in chair and requires min progressing to CGA with FWW to come to standing with increased time. BP sitting 143/53, standing 154/43, post gait 123/46; pt reports dizziness when returning to chair. Pt appears to be below her stated baseline with decreased activity tolerance and endurance impacting ability to carry out meaningful activities. OT recommending home with HHOT as she declines rehab at d/c. OT will continue to follow while admitted.    Anticipated Discharge Disposition (OT): home with assist, home with home health

## 2025-07-28 NOTE — THERAPY EVALUATION
Patient Name: Jayda Mclean  : 1960    MRN: [id number]                              Today's Date: 2025       Admit Date: 2025    Visit Dx:     ICD-10-CM ICD-9-CM   1. Multiple falls  R29.6 V15.88   2. Ataxia  R27.0 781.3   3. Hypomagnesemia  E83.42 275.2   4. Near syncope  R55 780.2   5. Hematoma of scalp, initial encounter  S00.03XA 920   6. Contusion of right knee, initial encounter  S80.01XA 924.11   7. Dehydration  E86.0 276.51   8. Anemia, unspecified type  D64.9 285.9     Patient Active Problem List   Diagnosis    Bipolar affective disorder, mixed    Generalized anxiety disorder    Insomnia due to mental disorder    Generalized weakness    Fall    Near syncope     Past Medical History:   Diagnosis Date    Anxiety     Bipolar disorder     Depression      Past Surgical History:   Procedure Laterality Date     SECTION      KNEE CARTILAGE SURGERY Right       General Information       Row Name 25 1344          Physical Therapy Time and Intention    Document Type evaluation  -     Mode of Treatment physical therapy  -       Row Name 25 1344          General Information    Patient Profile Reviewed yes  -     Prior Level of Function independent:;all household mobility;transfer;w/c or scooter  has caregiver 5 days/week, A [facility] LB dressing, uses rollator in home, manual w/c for longer distances  -     Existing Precautions/Restrictions fall  -     Barriers to Rehab medically complex;previous functional deficit  -       Row Name 25 1344          Living Environment    Current Living Arrangements independent living facility  -     People in Home alone  caregiver during the day 5 days/week per pt report  -       Row Name 25 1344          Home Main Entrance    Number of Stairs, Main Entrance none  -       Row Name 25 1344          Stairs Within Home, Primary    Number of Stairs, Within Home, Primary none  -       Row Name 25 1340           Cognition    Orientation Status (Cognition) oriented x 4  -       Row Name 07/28/25 1344          Safety Issues/Impairments Affecting Functional Mobility    Impairments Affecting Function (Mobility) pain;balance;strength;range of motion (ROM)  -               User Key  (r) = Recorded By, (t) = Taken By, (c) = Cosigned By      Initials Name Provider Type     Clara Gutierrez, PT Physical Therapist                   Mobility       Row Name 07/28/25 1345          Bed Mobility    Bed Mobility supine-sit  -     Supine-Sit [place] (Bed Mobility) modified independence  -     Assistive Device (Bed Mobility) bed rails;head of bed elevated  -       Row Name 07/28/25 1345          Bed-Chair Transfer    Bed-Chair [place] (Transfers) contact guard  -[facility] Name 07/28/25 1345          Sit-Stand Transfer    Sit-Stand [place] (Transfers) modified independence  -     Assistive Device (Sit-Stand Transfers) walker, front-wheeled  -[facility] Name 07/28/25 1345          Gait/Stairs (Locomotion)    [place] Level (Gait) contact guard  -     Assistive Device (Gait) walker, front-wheeled  -     Patient was able to Ambulate yes  -     Distance in Feet (Gait) 30  -JH     Deviations/Abnormal Patterns (Gait) base of support, wide  -               User Key  (r) = Recorded By, (t) = Taken By, (c) = Cosigned By      Initials Name Provider Type     Clara Gutierrez PT Physical Therapist                   Obj/Interventions       Row Name 07/28/25 1346          Range of Motion Comprehensive    Comment, General Range of Motion pain in B knees, AROM seated B knee flexion 90 degrees, pt with L foot deformity with inverted position in standing  -       Row Name 07/28/25 1346          Strength Comprehensive (MMT)    Comment, General Manual Muscle Testing (MMT) Assessment LEs hips 3/5, knees 3+/5  -       Row Name 07/28/25 1346          Motor Skills    Therapeutic Exercise --  AP, QS, heel slides x 5  -        [facility] Name 07/28/25 1346          Balance    Balance Assessment sitting static balance;sitting dynamic balance;standing static balance;standing dynamic balance  -     Static Sitting Balance independent  -     Dynamic Sitting Balance independent  -     Position, Sitting Balance unsupported;sitting edge of bed  -     Static Standing Balance modified independence  -JH     Dynamic Standing Balance contact guard  -     Position/Device Used, Standing Balance supported;walker, rolling  -[facility] Name 07/28/25 1346          Sensory Assessment (Somatosensory)    Sensory Assessment (Somatosensory) --  reports some mild neuropathy in B feet  -               User Key  (r) = Recorded By, (t) = Taken By, (c) = Cosigned By      Initials Name Provider Type     Clara Gutierrez, PT Physical Therapist                   Goals/Plan       Row Name 07/28/25 1351          Bed Mobility Goal 1 (PT)    Activity/Assistive Device (Bed Mobility Goal 1, PT) bed mobility activities, all  -     [place] Level/Cues Needed (Bed Mobility Goal 1, PT) independent  -     Time Frame (Bed Mobility Goal 1, PT) 1 week  -JH       Row Name 07/28/25 1351          Transfer Goal 1 (PT)    Activity/Assistive Device (Transfer Goal 1, PT) sit-to-stand/stand-to-sit;bed-to-chair/chair-to-bed;walker, rolling  -     [place] Level/Cues Needed (Transfer Goal 1, PT) modified independence  -JH     Time Frame (Transfer Goal 1, PT) 1 week  -JH       Row Name 07/28/25 1351          Gait Training Goal 1 (PT)    Activity/Assistive Device (Gait Training Goal 1, PT) gait (walking locomotion);assistive device use;walker, rolling  -     [place] Level (Gait Training Goal 1, PT) modified independence  -     Distance (Gait Training Goal 1, PT) 50'  -     Time Frame (Gait Training Goal 1, PT) 2 weeks  -JH       Row Name 07/28/25 1351          Therapy Assessment/Plan (PT)    Planned Therapy Interventions (PT) balance training;bed mobility  training;gait training;transfer training;strengthening;patient/family education;ROM (range of motion);home exercise program  -               User Key  (r) = Recorded By, (t) = Taken By, (c) = Cosigned By      Initials Name Provider Type     Clara Gutierrez, PT Physical Therapist                   Clinical Impression       Row Name 07/28/25 8016          Pain    Pain Location knee  -     Pain Side/Orientation bilateral  -     Additional Documentation Pain Scale: FACES Pre/Post-Treatment (Group)  -       Row Name 07/28/25 6854          Pain Scale: FACES Pre/Post-Treatment    Pain: FACES Scale, Pretreatment 4-->hurts little more  -     Posttreatment Pain Rating 4-->hurts little more  -       Row Name 07/28/25 5998          Plan of Care Review    Plan of Care Reviewed With patient  -     Outcome Evaluation 65 y.o. female with a CMH of DCIS right breast, COPD, HTN, DM type II, bipolar disorder/PTSD, chronic back pain who presented to [facility] on 7/27/2025 with Fall , hit her head.  C/o pain in head and L knee, has known B knee OA.   Pt reports frequent falls at home, fell 4x/day of admission.  Pt was recently d/c from here last month and was recommended to transfer to SNF for rehab, pt reports she did not like it there and has been at home wtih caregiver 5 days/week and supposed to be getting HH therapy.  Pt c/o B knee pain and reports she needs to have both knees replaced.  Pt uses a rollator in the home and w/c for longer distances.  This date, pt rates her knee pain 4-5/10 but she was able to transfer OOB and ambulate in room x 30' with RW and CGAx1.  Pt declines rehab at d/c but is agreeable to HH services.  Will follow pt 3x/week and recommend home with caregiver services and HHPT.  -       Row Name 07/28/25 2670          Therapy Assessment/Plan (PT)    Rehab Potential (PT) fair  -     Criteria for Skilled Interventions Met (PT) yes;skilled treatment is necessary  -     Therapy  Frequency (PT) 3 times/wk  -       Row Name 07/28/25 1347          Vital Signs    O2 Delivery Pre Treatment room air  -     O2 Delivery Intra Treatment room air  -     O2 Delivery Post Treatment room air  -       Row Name 07/28/25 1347          Positioning and Restraints    Pre-Treatment Position in bed  -     Post Treatment Position chair  -     In Chair notified nsg;reclined;call light within reach;encouraged to call for assist;exit alarm on  -               User Key  (r) = Recorded By, (t) = Taken By, (c) = Cosigned By      Initials Name Provider Type     Clara Gutierrez, PT Physical Therapist                   Outcome Measures       Row Name 07/28/25 1351 07/28/25 0800       How much help from another person do you currently need...    Turning from your back to your side while in flat bed without using bedrails? 3  - 2  -EY    Moving from lying on back to sitting on the side of a flat bed without bedrails? 3  - 2  -EY    Moving to and from a bed to a chair (including a wheelchair)? 3  - 2  -EY    Standing up from a chair using your arms (e.g., wheelchair, bedside chair)? 3  - 2  -EY    Climbing 3-5 steps with a railing? 2  - 2  -EY    To walk in hospital room? 3  - 2  -EY    AM-PAC 6 Clicks Score (PT) 17  - 12  -EY    Highest Level of Mobility Goal Stand (1 or More Minutes)-5  - Move to Chair/Commode-4  -EY      Row Name 07/28/25 1351          Functional Assessment    Outcome Measure Options AM-PAC 6 Clicks Basic Mobility (PT)  -               User Key  (r) = Recorded By, (t) = Taken By, (c) = Cosigned By      Initials Name Provider Type    Clara Williamson, PT Physical Therapist    Bertha Ledezma RN Registered Nurse                                 Physical Therapy Education       Title: PT OT SLP Therapies (Done)       Topic: Physical Therapy (Done)       Point: Mobility training (Done)       Learning Progress Summary            Patient Acceptance, E,TB, VU by  at 7/28/2025  1351                                      User Key       Initials Effective Dates Name Provider Type Discipline     06/16/21 -  Clara Gutierrez PT Physical Therapist PT                  PT Recommendation and Plan  Planned Therapy Interventions (PT): balance training, bed mobility training, gait training, transfer training, strengthening, patient/family education, ROM (range of motion), home exercise program  Outcome Evaluation: 65 y.o. female with a CMH of DCIS right breast, COPD, HTN, DM type II, bipolar disorder/PTSD, chronic back pain who presented to [facility] on 7/27/2025 with Fall , hit her head.  C/o pain in head and L knee, has known B knee OA.   Pt reports frequent falls at home, fell 4x/day of admission.  Pt was recently d/c from here last month and was recommended to transfer to SNF for rehab, pt reports she did not like it there and has been at home wtih caregiver 5 days/week and supposed to be getting HH therapy.  Pt c/o B knee pain and reports she needs to have both knees replaced.  Pt uses a rollator in the home and w/c for longer distances.  This date, pt rates her knee pain 4-5/10 but she was able to transfer OOB and ambulate in room x 30' with RW and CGAx1.  Pt declines rehab at d/c but is agreeable to HH services.  Will follow pt 3x/week and recommend home with caregiver services and HHPT.     Time Calculation:         PT Charges       Row Name 07/28/25 9375             Time Calculation    Start Time 1000  -      Stop Time 1020  -      Time Calculation (min) 20 min  -      PT Received On 07/28/25  -      PT - Next Appointment 07/30/25  -      PT Goal Re-Cert Due Date 08/11/25  -         Time Calculation- PT    Total Timed Code Minutes- PT 0 minute(s)  -                User Key  (r) = Recorded By, (t) = Taken By, (c) = Cosigned By      Initials Name Provider Type     Clara Gutierrez PT Physical Therapist                  Therapy Charges for Today       Code Description  Service Date Service Provider Modifiers Qty    17801265808 HC PT EVAL LOW COMPLEXITY 4 7/28/2025 Clara Gutierrez, PT GP 1            PT G-Codes  Outcome Measure Options: AM-PAC 6 Clicks Basic Mobility (PT)  AM-PAC 6 Clicks Score (PT): 17  PT Discharge Summary  Anticipated Discharge Disposition (PT): home with assist, home with home health    Clara Gutierrez, PT  7/28/2025
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
## 2025-07-28 NOTE — THERAPY EVALUATION
Patient Name: Jayda Mclean  : 1960    MRN: [id number]                              Today's Date: 2025       Admit Date: 2025    Visit Dx:     ICD-10-CM ICD-9-CM   1. Multiple falls  R29.6 V15.88   2. Ataxia  R27.0 781.3   3. Hypomagnesemia  E83.42 275.2   4. Near syncope  R55 780.2   5. Hematoma of scalp, initial encounter  S00.03XA 920   6. Contusion of right knee, initial encounter  S80.01XA 924.11   7. Dehydration  E86.0 276.51   8. Anemia, unspecified type  D64.9 285.9     Patient Active Problem List   Diagnosis    Bipolar affective disorder, mixed    Generalized anxiety disorder    Insomnia due to mental disorder    Generalized weakness    Fall    Near syncope     Past Medical History:   Diagnosis Date    Anxiety     Bipolar disorder     Depression      Past Surgical History:   Procedure Laterality Date     SECTION      KNEE CARTILAGE SURGERY Right       General Information       Row Name 25 1436          OT Time and Intention    Document Type evaluation  -SP     Mode of Treatment occupational therapy  -SP       Row Name 25 1436          General Information    Prior Level of Function --  assist with IADLs, driving, showers, LB dressing. Caregiver mon-fri, rollator within home, w/c within community  -SP     Existing Precautions/Restrictions fall  -SP     Barriers to Rehab medically complex;previous functional deficit  -SP       Row Name 25 1436          Living Environment    Current Living Arrangements apartment  -SP     People in Home alone  -SP       Row Name 25 1436          Home Main Entrance    Number of Stairs, Main Entrance none  -SP       Row Name 25 1436          Stairs Within Home, Primary    Number of Stairs, Within Home, Primary none  -SP       Row Name 25 1436          Cognition    Orientation Status (Cognition) oriented x 4  -SP       Row Name 25 1436          Safety Issues/Impairments Affecting Functional Mobility     Impairments Affecting Function (Mobility) pain;balance;strength;range of motion (ROM);endurance/activity tolerance  -SP               User Key  (r) = Recorded By, (t) = Taken By, (c) = Cosigned By      Initials Name Provider Type    SP Camilo Bello OT Occupational Therapist                     Mobility/ADL's       Row Name 07/28/25 1438          Bed Mobility    Bed Mobility bed mobility (all) activities  -SP     All Activities, [place] (Bed Mobility) not tested  -SP       Row Name 07/28/25 1438          Transfers    Transfers sit-stand transfer  -SP       Row Name 07/28/25 1438          Sit-Stand Transfer    Sit-Stand [place] (Transfers) contact guard;minimum assist (75% patient effort)  -SP     Assistive Device (Sit-Stand Transfers) walker, front-wheeled  -SP     Comment, (Sit-Stand Transfer) increased time  -[facility] Name 07/28/25 1438          Functional Mobility    Functional Mobility- Ind. Level contact guard assist  -SP     Functional Mobility- Device walker, front-wheeled  -SP     Patient was able to Ambulate yes  -SP               User Key  (r) = Recorded By, (t) = Taken By, (c) = Cosigned By      Initials Name Provider Type    SP Camilo Bello OT Occupational Therapist                   Obj/Interventions       Row Name 07/28/25 1438          Sensory Assessment (Somatosensory)    Sensory Assessment (Somatosensory) UE sensation intact  -[facility] Name 07/28/25 1438          Vision Assessment/Intervention    Visual Impairment/Limitations corrective lenses full-time  -[facility] Name 07/28/25 1438          Range of Motion Comprehensive    General Range of Motion bilateral upper extremity ROM WFL  -[facility] Name 07/28/25 1438          Strength Comprehensive (MMT)    Comment, General Manual Muscle Testing (MMT) Assessment BUE grossly 3+/5, bilateral  4/5  -[facility] Name 07/28/25 1438          Balance    Balance Assessment sitting dynamic balance;sit to stand dynamic  balance;standing dynamic balance  -SP     Dynamic Sitting Balance independent  -SP     Position, Sitting Balance unsupported;sitting in chair  -SP     Sit to Stand Dynamic Balance minimal assist;contact guard  -SP     Dynamic Standing Balance contact guard  -SP     Position/Device Used, Standing Balance walker, front-wheeled  -SP               User Key  (r) = Recorded By, (t) = Taken By, (c) = Cosigned By      Initials Name Provider Type    SP Camilo Bello, OT Occupational Therapist                   Goals/Plan       Row Name 07/28/25 1441          Bathing Goal 1 (OT)    Activity/Device (Bathing Goal 1, OT) bathing skills, all  -SP     [place] Level/Cues Needed (Bathing Goal 1, OT) minimum assist (75% or more patient effort)  -SP     Time Frame (Bathing Goal 1, OT) 2 weeks  -SP     Strategies/Barriers (Bathing Goal 1, OT) until d/c  -SP       Row Name 07/28/25 1441          Dressing Goal 1 (OT)    Activity/Device (Dressing Goal 1, OT) dressing skills, all;other (see comments)  education regarding adaptive equipment  -SP     [place]/Cues Needed (Dressing Goal 1, OT) minimum assist (75% or more patient effort)  -SP     Time Frame (Dressing Goal 1, OT) 2 weeks  -SP     Strategies/Barriers (Dressing Goal 1, OT) until d/c  -SP       Row Name 07/28/25 1441          Toileting Goal 1 (OT)    Activity/Device (Toileting Goal 1, OT) toileting skills, all  -SP     [place] Level/Cues Needed (Toileting Goal 1, OT) minimum assist (75% or more patient effort)  -SP     Time Frame (Toileting Goal 1, OT) 2 weeks  -SP     Strategies/Barriers (Toileting Goal 1, OT) until d/c  -SP       Row Name 07/28/25 1441          Problem Specific Goal 1 (OT)    Problem Specific Goal 1 (OT) pt will demo good understanding of BUE HEP  -SP     Time Frame (Problem Specific Goal 1, OT) 2 weeks  -SP     Strategies/Barriers (Problem Specific Goal 1, OT) until d/c  -SP       Row Name 07/28/25 1441          Therapy Assessment/Plan (OT)     Planned Therapy Interventions (OT) activity tolerance training;adaptive equipment training;BADL retraining;functional balance retraining;IADL retraining;edema control/reduction;patient/caregiver education/training;passive ROM/stretching;occupation/activity based interventions;transfer/mobility retraining;strengthening exercise;neuromuscular control/coordination retraining;ROM/therapeutic exercise  -SP               User Key  (r) = Recorded By, (t) = Taken By, (c) = Cosigned By      Initials Name Provider Type    SP [place] Bello OT Occupational Therapist                   Clinical Impression       Row Name 07/28/25 1440          Pain Assessment    Pretreatment Pain Rating 6/10  -SP     Posttreatment Pain Rating 6/10  -SP     Pain Location knee  -SP     Pain Side/Orientation bilateral  -SP     Response to Pain Interventions no change per patient report  -SP       Row Name 07/28/25 1440          Plan of Care Review    Plan of Care Reviewed With patient  -SP     Outcome Evaluation Pt is a 66 y/o female admitted to [facility] on 7/27/25 with c/o mechanical fall and striking her head. XR knee (-) acute osseous abnormality. CT head: (-) acute intracranial process. PMHx significant for DCIS right breast, COPD, HTN, DM type II, bipolar disorder/PTSD, and chronic back pain. At baseline, pt resides alone in 1st floor apt with 0 BRISSA. She reports a caregiver comes mon-fri 8am-4pm/12:30-4pm who assists with transportation, IADLs, showers, and LB dressing. Pt uses a rollator within apt and w/c within community. Upon assessment, pt A&O x4 with 6/10 pain in bilateral knees. She reports 10-15 falls within the past x3 months. Pt sitting in chair and requires min progressing to CGA with FWW to come to standing with increased time. BP sitting 143/53, standing 154/43, post gait 123/46; pt reports dizziness when returning to chair. Pt appears to be below her stated baseline with decreased activity tolerance and endurance impacting ability to  carry out meaningful activities. OT recommending home with HHOT as she declines rehab at d/c. OT will continue to follow while admitted.  -SP       Row Name 07/28/25 1440          Therapy Assessment/Plan (OT)    Criteria for Skilled Therapeutic Interventions Met (OT) yes;meets criteria;skilled treatment is necessary  -SP     Therapy Frequency (OT) 3 times/wk  -SP     Predicted Duration of Therapy Intervention (OT) until d/c  -SP       Row Name 07/28/25 1440          Therapy Plan Review/Discharge Plan (OT)    Anticipated Discharge Disposition (OT) home with assist;home with home health  -SP       Row Name 07/28/25 1440          Vital Signs    Pre Systolic BP Rehab 143  -SP     Pre Treatment Diastolic BP 53  -SP     Intra Systolic BP Rehab 154  -SP     Intra Treatment Diastolic BP 43  -SP     Post Systolic BP Rehab 123  -SP     Post Treatment Diastolic BP 46  -SP     O2 Delivery Pre Treatment room air  -SP     O2 Delivery Intra Treatment room air  -SP     O2 Delivery Post Treatment room air  -SP     Pre Patient Position Sitting  -SP     Intra Patient Position Standing  -SP     Post Patient Position Sitting  -SP       Row Name 07/28/25 1440          Positioning and Restraints    Pre-Treatment Position sitting in chair/recliner  -SP     Post Treatment Position chair  -SP     In Chair notified nsg;reclined;call light within reach;encouraged to call for assist;exit alarm on  -SP               User Key  (r) = Recorded By, (t) = Taken By, (c) = Cosigned By      Initials Name Provider Type    SP Camilo Bello, LIOR Occupational Therapist                   Outcome Measures       Row Name 07/28/25 1443          How much help from another is currently needed...    Putting on and taking off regular lower body clothing? 1  -SP     Bathing (including washing, rinsing, and drying) 2  -SP     Toileting (which includes using toilet bed pan or urinal) 2  -SP     Putting on and taking off regular upper body clothing 3  -SP     Taking  care of personal grooming (such as brushing teeth) 4  -SP     Eating meals 4  -SP     AM-PAC 6 Clicks Score (OT) 16  -SP       Row Name 07/28/25 1351 07/28/25 0800       How much help from another person do you currently need...    Turning from your back to your side while in flat bed without using bedrails? 3  -JH 2  -EY    Moving from lying on back to sitting on the side of a flat bed without bedrails? 3  -JH 2  -EY    Moving to and from a bed to a chair (including a wheelchair)? 3  -JH 2  -EY    Standing up from a chair using your arms (e.g., wheelchair, bedside chair)? 3  -JH 2  -EY    Climbing 3-5 steps with a railing? 2  -JH 2  -EY    To walk in hospital room? 3  -JH 2  -EY    AM-PAC 6 Clicks Score (PT) 17  -JH 12  -EY    Highest Level of Mobility Goal Stand (1 or More Minutes)-5  -JH Move to Chair/Commode-4  -EY      Row Name 07/28/25 1443 07/28/25 1351       Functional Assessment    Outcome Measure Options AM-PAC 6 Clicks Daily Activity (OT)  -SP AM-PAC 6 Clicks Basic Mobility (PT)  -              User Key  (r) = Recorded By, (t) = Taken By, (c) = Cosigned By      Initials Name Provider Type    Clara Williamson, PT Physical Therapist    Bertha Ledezma, RN Registered Nurse    Camilo Mcgill, OT Occupational Therapist                    Occupational Therapy Education       Title: PT OT SLP Therapies (Done)       Topic: Occupational Therapy (Done)       Point: ADL training (Done)       Learning Progress Summary            Patient Acceptance, E,TB, VU by SP at 7/28/2025 1444                      Point: Home exercise program (Done)       Learning Progress Summary            Patient Acceptance, E,TB, VU by SP at 7/28/2025 1444                      Point: Precautions (Done)       Learning Progress Summary            Patient Acceptance, E,TB, VU by SP at 7/28/2025 1444                      Point: Body mechanics (Done)       Learning Progress Summary            Patient Acceptance, E,TB, VU by SP at 7/28/2025  1444                                      User Key       Initials Effective Dates Name Provider Type Discipline    SP 11/15/23 -  Camilo Bello, LIOR Occupational Therapist OT                  OT Recommendation and Plan  Planned Therapy Interventions (OT): activity tolerance training, adaptive equipment training, BADL retraining, functional balance retraining, IADL retraining, edema control/reduction, patient/caregiver education/training, passive ROM/stretching, occupation/activity based interventions, transfer/mobility retraining, strengthening exercise, neuromuscular control/coordination retraining, ROM/therapeutic exercise  Therapy Frequency (OT): 3 times/wk  Plan of Care Review  Plan of Care Reviewed With: patient  Outcome Evaluation: Pt is a 66 y/o female admitted to [facility] on 7/27/25 with c/o mechanical fall and striking her head. XR knee (-) acute osseous abnormality. CT head: (-) acute intracranial process. PMHx significant for DCIS right breast, COPD, HTN, DM type II, bipolar disorder/PTSD, and chronic back pain. At baseline, pt resides alone in 1st floor apt with 0 BRISSA. She reports a caregiver comes mon-fri 8am-4pm/12:30-4pm who assists with transportation, IADLs, showers, and LB dressing. Pt uses a rollator within apt and w/c within community. Upon assessment, pt A&O x4 with 6/10 pain in bilateral knees. She reports 10-15 falls within the past x3 months. Pt sitting in chair and requires min progressing to CGA with FWW to come to standing with increased time. BP sitting 143/53, standing 154/43, post gait 123/46; pt reports dizziness when returning to chair. Pt appears to be below her stated baseline with decreased activity tolerance and endurance impacting ability to carry out meaningful activities. OT recommending home with HHOT as she declines rehab at d/c. OT will continue to follow while admitted.     Time Calculation:         Time Calculation- OT       Row Name 07/28/25 1443             Time Calculation-  OT    OT Start Time 1333  -SP      OT Stop Time 1404  -SP      OT Time Calculation (min) 31 min  -SP      OT Received On 07/28/25  -SP      OT - Next Appointment 07/30/25  -SP      OT Goal Re-Cert Due Date 08/11/25  -SP                User Key  (r) = Recorded By, (t) = Taken By, (c) = Cosigned By      Initials Name Provider Type    SP Camilo Bello, OT Occupational Therapist                  Therapy Charges for Today       Code Description Service Date Service Provider Modifiers Qty    [id number] HC OT EVAL MOD COMPLEXITY 4 7/28/2025 Camilo Bello OT GO 1                 Camilo Bello OT  7/28/2025

## 2025-07-28 NOTE — PLAN OF CARE
Goal Outcome Evaluation:      Pt is resting quietly abed with eyes closed at this time. No s/sx of any distress are noted. Plan of care is on-going.

## 2025-07-28 NOTE — ED NOTES
Provider notified of patient requiring max X 2 assistance to stand. Provider states orthostatic vitals can be limited to laying and sitting due to inability to stand.

## 2025-07-28 NOTE — H&P
[facility] Medicine Services  History & Physical    Patient Name: Jayda Mclean  : 1960  MRN: [id number]  Primary Care Physician:  Alice Damian MD  Date of admission: 2025  Date and Time of Service: 2025 at 22:32 EDT    Subjective      Chief Complaint:   Chief Complaint   Patient presents with    Fall        History of Present Illness: Jayda Mclean is a 65 y.o. female with a CMH of DCIS right breast, COPD, HTN, DM type II, bipolar disorder/PTSD, chronic back pain who presented to [facility] on 2025 with Fall   Fall  Incident onset: multiple times today at least 4. The fall occurred while standing and while walking. She landed on Hard floor. There was no blood loss. The point of impact was the head. The pain is present in the head and left knee. The pain is at a severity of 4/10. The pain is mild. The symptoms are aggravated by ambulation. Pertinent negatives include no abdominal pain, fever, headaches, nausea or vomiting.     Patient recently admitted for similar symptoms of generalized weakness and fall of which was recommended for rehabilitation previous admission.  Today and falls were purely mechanical as patient states that she lost her balance.  Denies any concerning symptoms of lightheadedness, dizziness, chest pain, palpitation, nausea, vomiting, bowel movement, vasovagal like symptoms.  Denies any loss of consciousness.  Denies any seizure-like activity.  - Patient does admit to having history of osteoarthritis of bilateral knees.  Trying to lose weight at this time with decreased p.o. intake.    Review of Systems   Constitutional:  Positive for appetite change. Negative for chills, diaphoresis, fatigue and fever.   HENT:  Negative for congestion, sneezing and sore throat.    Eyes:  Negative for visual disturbance.   Respiratory:  Negative for cough, chest tightness, shortness of breath and wheezing.    Cardiovascular:  Negative for chest pain,  palpitations and leg swelling.   Gastrointestinal:  Negative for abdominal distention, abdominal pain, constipation, diarrhea, nausea and vomiting.   Genitourinary:  Negative for decreased urine volume, difficulty urinating, dysuria, frequency and urgency.   Musculoskeletal:  Negative for arthralgias, gait problem and myalgias.   Skin:  Positive for wound (Posterior head). Negative for color change and pallor.   Neurological:  Positive for weakness. Negative for dizziness, syncope, light-headedness and headaches.   Psychiatric/Behavioral:  Negative for agitation, behavioral problems, confusion and decreased concentration.        Personal History     Past Medical History:   Diagnosis Date    Anxiety     Bipolar disorder     Depression        Past Surgical History:   Procedure Laterality Date     SECTION  1992    KNEE CARTILAGE SURGERY Right        Family History: family history includes Anxiety disorder in her brother. Otherwise pertinent FHx was reviewed and not pertinent to current issue.    Social History:  reports that she has been smoking cigarettes. She has never used smokeless tobacco. She reports that she does not currently use alcohol. She reports that she does not use drugs.    Home Medications:  Prior to Admission Medications       Prescriptions Last Dose Informant Patient Reported? Taking?    albuterol (PROVENTIL) (2.5 MG/3ML) 0.083% nebulizer solution   Yes No    Take 2.5 mg by nebulization Every 4 (Four) Hours As Needed for Wheezing.    ALPRAZolam (XANAX) 2 MG tablet   Yes No    Take 1 tablet by mouth 3 (Three) Times a Day As Needed for Anxiety.    aspirin 81 MG EC tablet   Yes No    Take 1 tablet by mouth Daily.    atorvastatin (LIPITOR) 80 MG tablet   Yes No    Take 1 tablet by mouth Daily.    calcium carb-cholecalciferol (Calcium + Vitamin D3) 600-10 MG-MCG tablet per tablet   Yes No    Take 1 tablet by mouth Daily.    candesartan (ATACAND) 8 MG tablet   Yes No    Take 1 tablet by mouth  Daily.    cetirizine (zyrTEC) 10 MG tablet   Yes No    Take 1 tablet by mouth Daily As Needed.    dicyclomine (BENTYL) 10 MG capsule   Yes No    Take 1 capsule by mouth 4 (Four) Times a Day As Needed.    docusate sodium (COLACE) 100 MG capsule   Yes No    Take 1 capsule by mouth 2 (Two) Times a Day As Needed for Constipation.    DULoxetine (CYMBALTA) 60 MG capsule   Yes No    Take 1 capsule by mouth Every Night.    econazole nitrate (SPECTAZOLE) 1 % cream   Yes No    Apply 1 Application topically to the appropriate area as directed Daily As Needed.    fenofibrate (TRICOR) 48 MG tablet   Yes No    Take 1 tablet by mouth Daily.    fluticasone (FLONASE) 50 MCG/ACT nasal spray   Yes No    Administer 1 spray into the nostril(s) as directed by provider Daily.    Fluticasone-Salmeterol (Wixela Inhub) 250-50 MCG/ACT DISKUS   Yes No    Inhale 1 puff 2 (Two) Times a Day.    FREESTYLE LITE test strip   Yes No    USE 1 STRIP DAILY AND PRN    gabapentin (NEURONTIN) 400 MG capsule   Yes No    Take 1 capsule by mouth 3 (Three) Times a Day.    hydrALAZINE (APRESOLINE) 25 MG tablet   No No    Take 1 tablet by mouth Every 8 (Eight) Hours for 30 days.    lamoTRIgine (LaMICtal) 100 MG tablet   Yes No    Take 1 tablet by mouth Every Night.    letrozole (FEMARA) 2.5 MG tablet   Yes No    Take 1 tablet by mouth Daily.    metFORMIN ER (GLUCOPHAGE-XR) 500 MG 24 hr tablet   Yes No    Take 2 tablets by mouth 2 (Two) Times a Day.    metoprolol tartrate (LOPRESSOR) 25 MG tablet   No No    Take 1 tablet by mouth Every 12 (Twelve) Hours for 30 days.    montelukast (SINGULAIR) 10 MG tablet   Yes No    Take 1 tablet by mouth Every Night.    OLANZapine (zyPREXA) 10 MG tablet   Yes No    Take 1 tablet by mouth Every Night.    pantoprazole (PROTONIX) 40 MG EC tablet   Yes No    Take 1 tablet by mouth Daily.    PROAIR  (90 Base) MCG/ACT inhaler   Yes No    Inhale 1 puff Every 4 (Four) Hours As Needed.              Allergies:  Allergies    Allergen Reactions    Lithium Hives    Venlafaxine Hcl Er Hives    Latuda [Lurasidone] Hives    Vraylar [Cariprazine Hcl] Dizziness       Objective      Vitals:   Temp:  [97.9 °F (36.6 °C)] 97.9 °F (36.6 °C)  Heart Rate:  [66-78] 70  Resp:  [18] 18  BP: (140-188)/(60-90) 165/75  Body mass index is 45.41 kg/m².  Physical Exam  Vitals and nursing note reviewed.   Constitutional:       General: She is not in acute distress.     Appearance: Normal appearance. She is obese. She is not ill-appearing or toxic-appearing.   HENT:      Head:      Comments: To have a localized bruise near right parietal lobe of scalp     Nose: Nose normal.      Mouth/Throat:      Mouth: Mucous membranes are moist.      Pharynx: Oropharynx is clear.   Eyes:      General: No scleral icterus.     Extraocular Movements: Extraocular movements intact.      Conjunctiva/sclera: Conjunctivae normal.   Cardiovascular:      Rate and Rhythm: Normal rate and regular rhythm.      Pulses: Normal pulses.      Heart sounds: Normal heart sounds. No murmur heard.     No friction rub. No gallop.   Pulmonary:      Effort: Pulmonary effort is normal. No respiratory distress.      Breath sounds: Normal breath sounds. No wheezing, rhonchi or rales.   Abdominal:      General: Abdomen is flat. Bowel sounds are normal. There is no distension.      Palpations: Abdomen is soft.      Tenderness: There is no abdominal tenderness. There is no guarding.   Musculoskeletal:         General: Normal range of motion.      Cervical back: Normal range of motion. No rigidity or tenderness.      Right lower leg: No edema.      Left lower leg: No edema.   Skin:     General: Skin is warm.      Coloration: Skin is not jaundiced or pale.   Neurological:      General: No focal deficit present.      Mental Status: She is alert and oriented to person, place, and time. Mental status is at baseline.   Psychiatric:         Mood and Affect: Mood normal.         Behavior: Behavior normal.          Thought Content: Thought content normal.         Judgment: Judgment normal.         Diagnostic Data:  Lab Results (last 24 hours)       Procedure Component Value Units Date/Time    High Sensitivity Troponin T 1Hr [[id number]]  (Abnormal) Collected: 07/27/25 2122    Specimen: Blood Updated: 07/27/25 2149     HS Troponin T 18 ng/L      Troponin T Numeric Delta 0 ng/L      Troponin T % Delta 0    Narrative:      High Sensitive Troponin T Reference Range:  <14.0 ng/L- Negative Female for AMI  <22.0 ng/L- Negative Male for AMI  >=14 - Abnormal Female indicating possible myocardial injury.  >=22 - Abnormal Male indicating possible myocardial injury.   Clinicians would have to utilize clinical acumen, EKG, Troponin, and serial changes to determine if it is an Acute Myocardial Infarction or myocardial injury due to an underlying chronic condition.         Urinalysis, Microscopic Only - Urine, Clean Catch [[id number]] Collected: 07/27/25 1957    Specimen: Urine, Clean Catch Updated: 07/27/25 2043     RBC, UA 0-2 /HPF      WBC, UA 0-2 /HPF      Comment: Urine culture not indicated.        Bacteria, UA None Seen /HPF      Squamous Epithelial Cells, UA 0-2 /HPF      Hyaline Casts, UA None Seen /LPF      Methodology Manual Light Microscopy    TSH Rfx On Abnormal To Free T4 [[id number]]  (Normal) Collected: 07/27/25 1956    Specimen: Blood from Arm, Left Updated: 07/27/25 2033     TSH 1.400 uIU/mL     High Sensitivity Troponin T [[id number]]  (Abnormal) Collected: 07/27/25 1956    Specimen: Blood from Arm, Left Updated: 07/27/25 2028     HS Troponin T 18 ng/L     Narrative:      High Sensitive Troponin T Reference Range:  <14.0 ng/L- Negative Female for AMI  <22.0 ng/L- Negative Male for AMI  >=14 - Abnormal Female indicating possible myocardial injury.  >=22 - Abnormal Male indicating possible myocardial injury.   Clinicians would have to utilize clinical acumen, EKG, Troponin, and serial changes to determine if it is an Acute  Myocardial Infarction or myocardial injury due to an underlying chronic condition.         CK [[id number]]  (Abnormal) Collected: 07/27/25 1956    Specimen: Blood from Arm, Left Updated: 07/27/25 2028     Creatine Kinase 196 U/L     Comprehensive Metabolic Panel [[id number]]  (Abnormal) Collected: 07/27/25 1956    Specimen: Blood from Arm, Left Updated: 07/27/25 2028     Glucose 135 mg/dL      BUN 12.3 mg/dL      Creatinine 0.79 mg/dL      Sodium 123 mmol/L      Potassium 3.9 mmol/L      Chloride 86 mmol/L      CO2 23.4 mmol/L      Calcium 9.4 mg/dL      Total Protein 6.3 g/dL      Albumin 4.0 g/dL      ALT (SGPT) 17 U/L      AST (SGOT) 17 U/L      Alkaline Phosphatase 79 U/L      Total Bilirubin 0.3 mg/dL      Globulin 2.3 gm/dL      A/G Ratio 1.7 g/dL      BUN/Creatinine Ratio 15.6     Anion Gap 13.6 mmol/L      eGFR 82.1 mL/min/1.73     Narrative:      GFR Categories in Chronic Kidney Disease (CKD)              GFR Category          GFR (mL/min/1.73)    Interpretation  G1                    90 or greater        Normal or high (1)  G2                    60-89                Mild decrease (1)  G3a                   45-59                Mild to moderate decrease  G3b                   30-44                Moderate to severe decrease  G4                    15-29                Severe decrease  G5                    14 or less           Kidney failure    (1)In the absence of evidence of kidney disease, neither GFR category G1 or G2 fulfill the criteria for CKD.    eGFR calculation 2021 CKD-EPI creatinine equation, which does not include race as a factor    Magnesium [[id number]]  (Abnormal) Collected: 07/27/25 1956    Specimen: Blood from Arm, Left Updated: 07/27/25 2028     Magnesium 1.3 mg/dL     Ammonia [[id number]]  (Normal) Collected: 07/27/25 1956    Specimen: Blood from Arm, Left Updated: 07/27/25 2021     Ammonia 21 umol/L     Urinalysis With Culture If Indicated - Urine, Clean Catch [[id number]]  (Abnormal)  Collected: 07/27/25 1957    Specimen: Urine, Clean Catch Updated: 07/27/25 2010     Color, UA Yellow     Appearance, UA Clear     pH, UA 6.5     Specific Gravity, UA <=1.005     Glucose, UA Negative     Ketones, UA Negative     Bilirubin, UA Negative     Blood, UA Negative     Protein,  mg/dL (2+)     Leuk Esterase, UA Trace     Nitrite, UA Negative     Urobilinogen, UA 0.2 E.U./dL    Narrative:      In absence of clinical symptoms, the presence of pyuria, bacteria, and/or nitrites on the urinalysis result does not correlate with infection.    CBC & Differential [[id number]]  (Abnormal) Collected: 07/27/25 1956    Specimen: Blood from Arm, Left Updated: 07/27/25 2005    Narrative:      The following orders were created for panel order CBC & Differential.  Procedure                               Abnormality         Status                     ---------                               -----------         ------                     CBC Auto Differential[[id number]]        Abnormal            Final result                 Please view results for these tests on the individual orders.    CBC Auto Differential [[id number]]  (Abnormal) Collected: 07/27/25 1956    Specimen: Blood from Arm, Left Updated: 07/27/25 2005     WBC 9.07 10*3/mm3      RBC 3.86 10*6/mm3      Hemoglobin 10.6 g/dL      Hematocrit 32.3 %      MCV 83.7 fL      MCH 27.5 pg      MCHC 32.8 g/dL      RDW 13.5 %      RDW-SD 41.0 fl      MPV 8.9 fL      Platelets 418 10*3/mm3      Neutrophil % 74.3 %      Lymphocyte % 14.9 %      Monocyte % 6.5 %      Eosinophil % 3.6 %      Basophil % 0.3 %      Immature Grans % 0.4 %      Neutrophils, Absolute 6.73 10*3/mm3      Lymphocytes, Absolute 1.35 10*3/mm3      Monocytes, Absolute 0.59 10*3/mm3      Eosinophils, Absolute 0.33 10*3/mm3      Basophils, Absolute 0.03 10*3/mm3      Immature Grans, Absolute 0.04 10*3/mm3      nRBC 0.0 /100 WBC              Imaging Results (Last 24 Hours)       Procedure Component Value  Units Date/Time    CT Head Without Contrast [[id number]] Collected: 07/27/25 2050     Updated: 07/27/25 2053    Narrative:      CT HEAD WO CONTRAST    Date of Exam: 7/27/2025 8:19 PM EDT    Indication: Occipital hematoma history of recent multiple falls.    Comparison: 8/18/2013.    Technique: Axial CT images were obtained of the head without contrast administration.  Coronal reconstructions were performed.  Automated exposure control and iterative reconstruction methods were used.      Findings:  There is no evidence of hemorrhage. There is no mass effect or midline shift.    There is no extracerebral collection.    Ventricles are normal in size and configuration for patient's stated age.      Posterior fossa is within normal limits.    Calvarium and skull base appear intact.   Visualized sinuses show no air fluid levels. Visualized orbits are unremarkable. Soft tissue swelling present within the right parietal scalp.      Impression:      Impression:  No acute intracranial process. Soft tissue swelling present within the right parietal scalp.          Electronically Signed: Maria Elena Del Valle MD    7/27/2025 8:51 PM EDT    Workstation ID: [id number]    XR Knee 3 View Left [[id number]] Collected: 07/27/25 2021     Updated: 07/27/25 2024    Narrative:      XR KNEE 3 VW LEFT    Date of Exam: 7/27/2025 8:18 PM EDT    Indication: History of multiple falls    Comparison: 8/18/2021.    Findings:  No evidence of fracture. No evidence of dislocation. No joint effusion identified. No focal soft tissue abnormality is identified. Moderate osteoarthritic changes are present, most pronounced within the lateral compartment.         Impression:      Impression:  No acute osseous abnormality. Moderate osteoarthritic changes are present. No joint effusion.          Electronically Signed: Maria Elena Del Valle MD    7/27/2025 8:22 PM EDT    Workstation ID: [id number]              Assessment & Plan        This is a 65 y.o. female with:    Active and  Resolved Problems  Active Hospital Problems    Diagnosis  POA    **Fall [W19.XXXA]  Yes      Resolved Hospital Problems   No resolved problems to display.     Generalized weakness with frequent falls and impaired mobility with a history of known bilateral knee osteoarthritis.  - Continue outpatient follow-up with orthopedics, pending possible surgical intervention after weight loss in outpatient setting per patient.  - PT/OT ordered  - / evaluation post therapy evaluation.    Hyponatremia likely secondary to change in p.o. intake  - IV fluid ordered    Hypomagnesia likely secondary to change in p.o. intake  - IV magnesium for    H/o DCIS right breast   - cont home letrozole      COPD  - not in exacerbation  - duonebs prn, symbicort, singulair     Hypertension  - Resume home medication    DM 2-resume home    Bipolar/depression/PTSD-continue home medication    VTE Prophylaxis:  Mechanical VTE prophylaxis orders are present.        The patient desires to be as follows:    CODE STATUS:    Code Status (Patient has no pulse and is not breathing): CPR (Attempt to Resuscitate)  Medical Interventions (Patient has pulse or is breathing): Full Support  Level Of Support Discussed With: Patient        Sivakumar Welsh, who can be contacted at [phone], is the designated person to make medical decisions on the patient's behalf if She is incapable of doing so. This was clarified with patient and/or next of kin on 7/27/2025 during the course of this H&P.    Admission Status:  I believe this patient meets observation status.    Expected Length of Stay: 1-2 days     PDMP and Medication Dispenses via Sidebar reviewed and consistent with patient reported medications.    I discussed the patient's findings and my recommendations with patient.      Signature:     This document has been electronically signed by Jeison Mcleroy MD on July 27, 2025 22:32 EDT   [facility] Hospitalist Team

## 2025-07-28 NOTE — PLAN OF CARE
Goal Outcome Evaluation:  Plan of Care Reviewed With: patient           Outcome Evaluation: 65 y.o. female with a CMH of DCIS right breast, COPD, HTN, DM type II, bipolar disorder/PTSD, chronic back pain who presented to [facility] on 7/27/2025 with Fall , hit her head.  C/o pain in head and L knee, has known B knee OA.   Pt reports frequent falls at home, fell 4x/day of admission.  Pt was recently d/c from here last month and was recommended to transfer to SNF for rehab, pt reports she did not like it there and has been at home wtih caregiver 5 days/week and supposed to be getting HH therapy.  Pt c/o B knee pain and reports she needs to have both knees replaced.  Pt uses a rollator in the home and w/c for longer distances.  This date, pt rates her knee pain 4-5/10 but she was able to transfer OOB and ambulate in room x 30' with RW and CGAx1.  Pt declines rehab at d/c but is agreeable to HH services.  Will follow pt 3x/week and recommend home with caregiver services and HHPT.    Anticipated Discharge Disposition (PT): home with assist, home with home health

## 2025-07-28 NOTE — ACP (ADVANCE CARE PLANNING)
Social Work Assessment  [facility]     Patient Name: Jayda Mclean  MRN: [id number]  Today's Date: 7/28/2025    Admit Date: 7/27/2025     Demographic Summary       Row Name 07/28/25 1254       General Information    Reason for Consult health care directive      General Information Comments SW reviewed chart and noted pt's brother is the only listed contact, no ADs on file. SW met with pt to determine legal NOK. Pt reports her eldest dtr Melanie is her POA/HCR and provided contact info, chart updated. RYAN called Melanie to obtain documents @[phone] but no answer, LVM.           Marilyn Brownlee, MSW, LSW  Medical Social Worker  Ph [phone]  Fax [phone]  [email or web address]

## 2025-07-29 LAB
ANION GAP SERPL CALCULATED.3IONS-SCNC: 11.2 MMOL/L (ref 5–15)
ANION GAP SERPL CALCULATED.3IONS-SCNC: 8.1 MMOL/L (ref 5–15)
ANION GAP SERPL CALCULATED.3IONS-SCNC: 8.2 MMOL/L (ref 5–15)
ANION GAP SERPL CALCULATED.3IONS-SCNC: 9.4 MMOL/L (ref 5–15)
BASOPHILS # BLD AUTO: 0.05 10*3/MM3 (ref 0–0.2)
BASOPHILS # BLD AUTO: 0.06 10*3/MM3 (ref 0–0.2)
BASOPHILS NFR BLD AUTO: 0.7 % (ref 0–1.5)
BASOPHILS NFR BLD AUTO: 1 % (ref 0–1.5)
BUN SERPL-MCNC: 17.5 MG/DL (ref 8–23)
BUN SERPL-MCNC: 17.7 MG/DL (ref 8–23)
BUN SERPL-MCNC: 17.9 MG/DL (ref 8–23)
BUN SERPL-MCNC: 18.2 MG/DL (ref 8–23)
BUN/CREAT SERPL: 15.7 (ref 7–25)
BUN/CREAT SERPL: 17.2 (ref 7–25)
BUN/CREAT SERPL: 19.7 (ref 7–25)
BUN/CREAT SERPL: 23.9 (ref 7–25)
CALCIUM SPEC-SCNC: 8.5 MG/DL (ref 8.6–10.5)
CALCIUM SPEC-SCNC: 8.7 MG/DL (ref 8.6–10.5)
CALCIUM SPEC-SCNC: 8.8 MG/DL (ref 8.6–10.5)
CALCIUM SPEC-SCNC: 9 MG/DL (ref 8.6–10.5)
CHLORIDE SERPL-SCNC: 86 MMOL/L (ref 98–107)
CHLORIDE SERPL-SCNC: 88 MMOL/L (ref 98–107)
CHLORIDE SERPL-SCNC: 89 MMOL/L (ref 98–107)
CHLORIDE SERPL-SCNC: 91 MMOL/L (ref 98–107)
CO2 SERPL-SCNC: 23.8 MMOL/L (ref 22–29)
CO2 SERPL-SCNC: 23.9 MMOL/L (ref 22–29)
CO2 SERPL-SCNC: 24.8 MMOL/L (ref 22–29)
CO2 SERPL-SCNC: 25.6 MMOL/L (ref 22–29)
CREAT SERPL-MCNC: 0.75 MG/DL (ref 0.57–1)
CREAT SERPL-MCNC: 0.9 MG/DL (ref 0.57–1)
CREAT SERPL-MCNC: 1.02 MG/DL (ref 0.57–1)
CREAT SERPL-MCNC: 1.16 MG/DL (ref 0.57–1)
DEPRECATED RDW RBC AUTO: 42.8 FL (ref 37–54)
DEPRECATED RDW RBC AUTO: 43.3 FL (ref 37–54)
EGFRCR SERPLBLD CKD-EPI 2021: 51.8 ML/MIN/1.73
EGFRCR SERPLBLD CKD-EPI 2021: 60.4 ML/MIN/1.73
EGFRCR SERPLBLD CKD-EPI 2021: 70.2 ML/MIN/1.73
EGFRCR SERPLBLD CKD-EPI 2021: 87.4 ML/MIN/1.73
EOSINOPHIL # BLD AUTO: 0.45 10*3/MM3 (ref 0–0.4)
EOSINOPHIL # BLD AUTO: 0.64 10*3/MM3 (ref 0–0.4)
EOSINOPHIL NFR BLD AUTO: 7.8 % (ref 0.3–6.2)
EOSINOPHIL NFR BLD AUTO: 8.6 % (ref 0.3–6.2)
ERYTHROCYTE [DISTWIDTH] IN BLOOD BY AUTOMATED COUNT: 13.8 % (ref 12.3–15.4)
ERYTHROCYTE [DISTWIDTH] IN BLOOD BY AUTOMATED COUNT: 13.9 % (ref 12.3–15.4)
GLUCOSE BLDC GLUCOMTR-MCNC: 103 MG/DL (ref 70–105)
GLUCOSE SERPL-MCNC: 110 MG/DL (ref 65–99)
GLUCOSE SERPL-MCNC: 110 MG/DL (ref 65–99)
GLUCOSE SERPL-MCNC: 80 MG/DL (ref 65–99)
GLUCOSE SERPL-MCNC: 98 MG/DL (ref 65–99)
HCT VFR BLD AUTO: 27.6 % (ref 34–46.6)
HCT VFR BLD AUTO: 28.1 % (ref 34–46.6)
HGB BLD-MCNC: 8.9 G/DL (ref 12–15.9)
HGB BLD-MCNC: 9.1 G/DL (ref 12–15.9)
IMM GRANULOCYTES # BLD AUTO: 0.03 10*3/MM3 (ref 0–0.05)
IMM GRANULOCYTES # BLD AUTO: 0.03 10*3/MM3 (ref 0–0.05)
IMM GRANULOCYTES NFR BLD AUTO: 0.4 % (ref 0–0.5)
IMM GRANULOCYTES NFR BLD AUTO: 0.5 % (ref 0–0.5)
LYMPHOCYTES # BLD AUTO: 1.5 10*3/MM3 (ref 0.7–3.1)
LYMPHOCYTES # BLD AUTO: 2.45 10*3/MM3 (ref 0.7–3.1)
LYMPHOCYTES NFR BLD AUTO: 25.9 % (ref 19.6–45.3)
LYMPHOCYTES NFR BLD AUTO: 33 % (ref 19.6–45.3)
MAGNESIUM SERPL-MCNC: 1.4 MG/DL (ref 1.6–2.4)
MCH RBC QN AUTO: 27.3 PG (ref 26.6–33)
MCH RBC QN AUTO: 27.8 PG (ref 26.6–33)
MCHC RBC AUTO-ENTMCNC: 31.7 G/DL (ref 31.5–35.7)
MCHC RBC AUTO-ENTMCNC: 33 G/DL (ref 31.5–35.7)
MCV RBC AUTO: 84.4 FL (ref 79–97)
MCV RBC AUTO: 86.2 FL (ref 79–97)
MONOCYTES # BLD AUTO: 0.4 10*3/MM3 (ref 0.1–0.9)
MONOCYTES # BLD AUTO: 0.42 10*3/MM3 (ref 0.1–0.9)
MONOCYTES NFR BLD AUTO: 5.7 % (ref 5–12)
MONOCYTES NFR BLD AUTO: 6.9 % (ref 5–12)
NEUTROPHILS NFR BLD AUTO: 3.36 10*3/MM3 (ref 1.7–7)
NEUTROPHILS NFR BLD AUTO: 3.83 10*3/MM3 (ref 1.7–7)
NEUTROPHILS NFR BLD AUTO: 51.6 % (ref 42.7–76)
NEUTROPHILS NFR BLD AUTO: 57.9 % (ref 42.7–76)
NRBC BLD AUTO-RTO: 0 /100 WBC (ref 0–0.2)
NRBC BLD AUTO-RTO: 0 /100 WBC (ref 0–0.2)
OSMOLALITY UR: 154 MOSM/KG (ref 300–800)
PLATELET # BLD AUTO: 376 10*3/MM3 (ref 140–450)
PLATELET # BLD AUTO: 384 10*3/MM3 (ref 140–450)
PMV BLD AUTO: 9 FL (ref 6–12)
PMV BLD AUTO: 9.2 FL (ref 6–12)
POTASSIUM SERPL-SCNC: 4.3 MMOL/L (ref 3.5–5.2)
POTASSIUM SERPL-SCNC: 4.4 MMOL/L (ref 3.5–5.2)
POTASSIUM SERPL-SCNC: 4.7 MMOL/L (ref 3.5–5.2)
POTASSIUM SERPL-SCNC: 4.7 MMOL/L (ref 3.5–5.2)
RBC # BLD AUTO: 3.26 10*6/MM3 (ref 3.77–5.28)
RBC # BLD AUTO: 3.27 10*6/MM3 (ref 3.77–5.28)
SODIUM SERPL-SCNC: 119 MMOL/L (ref 136–145)
SODIUM SERPL-SCNC: 123 MMOL/L (ref 136–145)
SODIUM SERPL-SCNC: 124 MMOL/L (ref 136–145)
SODIUM UR-SCNC: <20 MMOL/L
WBC NRBC COR # BLD AUTO: 5.8 10*3/MM3 (ref 3.4–10.8)
WBC NRBC COR # BLD AUTO: 7.42 10*3/MM3 (ref 3.4–10.8)

## 2025-07-29 PROCEDURE — 94799 UNLISTED PULMONARY SVC/PX: CPT

## 2025-07-29 PROCEDURE — 25010000002 ENOXAPARIN PER 10 MG: Performed by: STUDENT IN AN ORGANIZED HEALTH CARE EDUCATION/TRAINING PROGRAM

## 2025-07-29 PROCEDURE — 83935 ASSAY OF URINE OSMOLALITY: CPT | Performed by: INTERNAL MEDICINE

## 2025-07-29 PROCEDURE — 80048 BASIC METABOLIC PNL TOTAL CA: CPT | Performed by: INTERNAL MEDICINE

## 2025-07-29 PROCEDURE — 83735 ASSAY OF MAGNESIUM: CPT | Performed by: INTERNAL MEDICINE

## 2025-07-29 PROCEDURE — 25810000003 SODIUM CHLORIDE 0.9 % SOLUTION: Performed by: INTERNAL MEDICINE

## 2025-07-29 PROCEDURE — 84295 ASSAY OF SERUM SODIUM: CPT | Performed by: INTERNAL MEDICINE

## 2025-07-29 PROCEDURE — 94761 N-INVAS EAR/PLS OXIMETRY MLT: CPT

## 2025-07-29 PROCEDURE — 84300 ASSAY OF URINE SODIUM: CPT | Performed by: INTERNAL MEDICINE

## 2025-07-29 PROCEDURE — 94664 DEMO&/EVAL PT USE INHALER: CPT

## 2025-07-29 PROCEDURE — 85025 COMPLETE CBC W/AUTO DIFF WBC: CPT | Performed by: INTERNAL MEDICINE

## 2025-07-29 PROCEDURE — 82948 REAGENT STRIP/BLOOD GLUCOSE: CPT

## 2025-07-29 RX ORDER — SODIUM CHLORIDE 9 MG/ML
100 INJECTION, SOLUTION INTRAVENOUS CONTINUOUS
Status: DISCONTINUED | OUTPATIENT
Start: 2025-07-29 | End: 2025-07-31 | Stop reason: HOSPADM

## 2025-07-29 RX ADMIN — MONTELUKAST 10 MG: 10 TABLET, FILM COATED ORAL at 21:05

## 2025-07-29 RX ADMIN — BUDESONIDE 0.5 MG: 0.5 SUSPENSION RESPIRATORY (INHALATION) at 08:44

## 2025-07-29 RX ADMIN — ENOXAPARIN SODIUM 40 MG: 100 INJECTION SUBCUTANEOUS at 05:30

## 2025-07-29 RX ADMIN — ASPIRIN 81 MG: 81 TABLET, COATED ORAL at 08:51

## 2025-07-29 RX ADMIN — GABAPENTIN 400 MG: 400 CAPSULE ORAL at 21:00

## 2025-07-29 RX ADMIN — ATORVASTATIN CALCIUM 80 MG: 40 TABLET, FILM COATED ORAL at 08:53

## 2025-07-29 RX ADMIN — ACETAMINOPHEN 650 MG: 325 TABLET, FILM COATED ORAL at 08:52

## 2025-07-29 RX ADMIN — ENOXAPARIN SODIUM 40 MG: 100 INJECTION SUBCUTANEOUS at 17:41

## 2025-07-29 RX ADMIN — SODIUM CHLORIDE 100 ML/HR: 9 INJECTION, SOLUTION INTRAVENOUS at 13:37

## 2025-07-29 RX ADMIN — BISACODYL 5 MG: 5 TABLET, COATED ORAL at 14:44

## 2025-07-29 RX ADMIN — CETIRIZINE HYDROCHLORIDE 10 MG: 10 TABLET, FILM COATED ORAL at 21:00

## 2025-07-29 RX ADMIN — ALPRAZOLAM 2 MG: 1 TABLET ORAL at 17:38

## 2025-07-29 RX ADMIN — DULOXETINE 60 MG: 30 CAPSULE, DELAYED RELEASE ORAL at 21:00

## 2025-07-29 RX ADMIN — SODIUM CHLORIDE 100 ML/HR: 9 INJECTION, SOLUTION INTRAVENOUS at 22:22

## 2025-07-29 RX ADMIN — OLANZAPINE 10 MG: 5 TABLET, FILM COATED ORAL at 21:00

## 2025-07-29 RX ADMIN — Medication 10 ML: at 21:05

## 2025-07-29 RX ADMIN — GABAPENTIN 400 MG: 400 CAPSULE ORAL at 08:50

## 2025-07-29 RX ADMIN — BUDESONIDE 0.5 MG: 0.5 SUSPENSION RESPIRATORY (INHALATION) at 18:45

## 2025-07-29 RX ADMIN — METOPROLOL TARTRATE 25 MG: 25 TABLET, FILM COATED ORAL at 21:00

## 2025-07-29 RX ADMIN — HYDRALAZINE HYDROCHLORIDE 25 MG: 25 TABLET ORAL at 05:23

## 2025-07-29 RX ADMIN — ACETAMINOPHEN 650 MG: 325 TABLET, FILM COATED ORAL at 16:31

## 2025-07-29 RX ADMIN — LETROZOLE 2.5 MG: 2.5 TABLET ORAL at 08:50

## 2025-07-29 RX ADMIN — ARFORMOTEROL TARTRATE 15 MCG: 15 SOLUTION RESPIRATORY (INHALATION) at 08:44

## 2025-07-29 RX ADMIN — ARFORMOTEROL TARTRATE 15 MCG: 15 SOLUTION RESPIRATORY (INHALATION) at 18:40

## 2025-07-29 RX ADMIN — GABAPENTIN 400 MG: 400 CAPSULE ORAL at 16:31

## 2025-07-29 RX ADMIN — NYSTATIN 1 APPLICATION: 100000 CREAM TOPICAL at 08:55

## 2025-07-29 RX ADMIN — METFORMIN HYDROCHLORIDE 1000 MG: 500 TABLET, EXTENDED RELEASE ORAL at 21:00

## 2025-07-29 RX ADMIN — PANTOPRAZOLE SODIUM 40 MG: 40 TABLET, DELAYED RELEASE ORAL at 08:51

## 2025-07-29 RX ADMIN — NYSTATIN 1 APPLICATION: 100000 CREAM TOPICAL at 21:08

## 2025-07-29 RX ADMIN — FENOFIBRATE 48 MG: 48 TABLET ORAL at 08:50

## 2025-07-29 RX ADMIN — METFORMIN HYDROCHLORIDE 1000 MG: 500 TABLET, EXTENDED RELEASE ORAL at 08:51

## 2025-07-29 RX ADMIN — VALSARTAN 40 MG: 80 TABLET ORAL at 08:52

## 2025-07-29 RX ADMIN — LAMOTRIGINE 100 MG: 100 TABLET ORAL at 21:00

## 2025-07-29 RX ADMIN — HYDRALAZINE HYDROCHLORIDE 25 MG: 25 TABLET ORAL at 21:00

## 2025-07-29 RX ADMIN — ALPRAZOLAM 2 MG: 1 TABLET ORAL at 08:50

## 2025-07-29 NOTE — CONSULTS
Nephrology Consult Note                                                [facility]      Patient Identification:  Name: Jayda Mclean  Age: 65 y.o.  Sex: female  :  1960  MRN: [id number]               Requesting Physician: Jeison Mcelroy MD  Reason for Consultation: management recommendations      History of Present Illness:    Patient is a 65-year-old female patient with a history of hypertension diabetes COPD bipolar disorder who presented to the hospital status post fall with head injury with evaluation was found to have low sodium that has been getting worse down to 123 prompting renal consultation  Problem List:    Fall    Near syncope     Past Medical History:  Past Medical History:   Diagnosis Date    Anxiety     Bipolar disorder     Depression      Past Surgical History:  Past Surgical History:   Procedure Laterality Date     SECTION      KNEE CARTILAGE SURGERY Right       Home Meds:  Medications Prior to Admission   Medication Sig Dispense Refill Last Dose/Taking    albuterol (PROVENTIL) (2.5 MG/3ML) 0.083% nebulizer solution Take 2.5 mg by nebulization Every 4 (Four) Hours As Needed for Wheezing.       ALPRAZolam (XANAX) 2 MG tablet Take 1 tablet by mouth 3 (Three) Times a Day As Needed for Anxiety.       aspirin 81 MG EC tablet Take 1 tablet by mouth Daily.       atorvastatin (LIPITOR) 80 MG tablet Take 1 tablet by mouth Daily.  4     calcium carb-cholecalciferol (Calcium + Vitamin D3) 600-10 MG-MCG tablet per tablet Take 1 tablet by mouth Daily.       candesartan (ATACAND) 8 MG tablet Take 1 tablet by mouth Daily.       cetirizine (zyrTEC) 10 MG tablet Take 1 tablet by mouth Daily As Needed.  6     dicyclomine (BENTYL) 10 MG capsule Take 1 capsule by mouth 4 (Four) Times a Day As Needed.  2     docusate sodium (COLACE) 100 MG capsule Take 1 capsule by mouth 2 (Two) Times a Day As Needed for Constipation.       DULoxetine (CYMBALTA) 60 MG capsule Take 1 capsule by  mouth Every Night.       econazole nitrate (SPECTAZOLE) 1 % cream Apply 1 Application topically to the appropriate area as directed Daily As Needed.       fenofibrate (TRICOR) 48 MG tablet Take 1 tablet by mouth Daily.  2     fluticasone (FLONASE) 50 MCG/ACT nasal spray Administer 1 spray into the nostril(s) as directed by provider Daily.  6     Fluticasone-Salmeterol (Wixela Inhub) 250-50 MCG/ACT DISKUS Inhale 1 puff 2 (Two) Times a Day.       FREESTYLE LITE test strip USE 1 STRIP DAILY AND PRN  7     gabapentin (NEURONTIN) 400 MG capsule Take 1 capsule by mouth 3 (Three) Times a Day.       hydrALAZINE (APRESOLINE) 25 MG tablet Take 1 tablet by mouth Every 8 (Eight) Hours for 30 days. 90 tablet 0     lamoTRIgine (LaMICtal) 100 MG tablet Take 1 tablet by mouth Every Night.       letrozole (FEMARA) 2.5 MG tablet Take 1 tablet by mouth Daily.       metFORMIN ER (GLUCOPHAGE-XR) 500 MG 24 hr tablet Take 2 tablets by mouth 2 (Two) Times a Day.  4     metoprolol tartrate (LOPRESSOR) 25 MG tablet Take 1 tablet by mouth Every 12 (Twelve) Hours for 30 days. 60 tablet 0     montelukast (SINGULAIR) 10 MG tablet Take 1 tablet by mouth Every Night.  11     OLANZapine (zyPREXA) 10 MG tablet Take 1 tablet by mouth Every Night.       pantoprazole (PROTONIX) 40 MG EC tablet Take 1 tablet by mouth Daily.  4     PROAIR  (90 Base) MCG/ACT inhaler Inhale 1 puff Every 4 (Four) Hours As Needed.  6      Current Meds:     Current Facility-Administered Medications:     acetaminophen (TYLENOL) tablet 650 mg, 650 mg, Oral, Q6H PRN, Jeison Mcelroy MD, 650 mg at 07/29/25 0852    albuterol (PROVENTIL) nebulizer solution 0.083% 2.5 mg/3mL, 2.5 mg, Nebulization, Q4H PRN, Jeison Mcelroy MD    ALPRAZolam (XANAX) tablet 2 mg, 2 mg, Oral, TID PRN, Jeison Mcelroy MD, 2 mg at 07/29/25 0850    budesonide (PULMICORT) nebulizer solution 0.5 mg, 0.5 mg, Nebulization, BID - RT, 0.5 mg at 07/29/25 0844 **AND** arformoterol (BROVANA) nebulizer solution 15 mcg, 15  mcg, Nebulization, BID - RT, Jeison Mcelroy MD, 15 mcg at 07/29/25 0844    aspirin EC tablet 81 mg, 81 mg, Oral, Daily, Jeison Mcelroy MD, 81 mg at 07/29/25 0851    atorvastatin (LIPITOR) tablet 80 mg, 80 mg, Oral, Daily, Jeison Mcelroy MD, 80 mg at 07/29/25 0853    sennosides-docusate (PERICOLACE) 8.6-50 MG per tablet 2 tablet, 2 tablet, Oral, BID PRN **AND** polyethylene glycol (MIRALAX) packet 17 g, 17 g, Oral, Daily PRN **AND** bisacodyl (DULCOLAX) EC tablet 5 mg, 5 mg, Oral, Daily PRN **AND** bisacodyl (DULCOLAX) suppository 10 mg, 10 mg, Rectal, Daily PRN, Jeison Mcelroy MD    Calcium Replacement - Follow Nurse / BPA Driven Protocol, , Not Applicable, PRN, Jeison Mcelroy MD    cetirizine (zyrTEC) tablet 10 mg, 10 mg, Oral, Nightly, Jeison Mcelroy MD, 10 mg at 07/28/25 2010    DULoxetine (CYMBALTA) DR capsule 60 mg, 60 mg, Oral, Nightly, Jeison Mcelroy MD, 60 mg at 07/28/25 2010    enoxaparin sodium (LOVENOX) syringe 40 mg, 40 mg, Subcutaneous, Q12H, Mago Moralez MD, 40 mg at 07/29/25 0530    fenofibrate (TRICOR) tablet 48 mg, 48 mg, Oral, Daily, Jeison Mcelroy MD, 48 mg at 07/29/25 0850    gabapentin (NEURONTIN) capsule 400 mg, 400 mg, Oral, TID, Jeison Mcelroy MD, 400 mg at 07/29/25 0850    hydrALAZINE (APRESOLINE) tablet 25 mg, 25 mg, Oral, Q8H, Jeison Mcelroy MD, 25 mg at 07/29/25 0523    lamoTRIgine (LaMICtal) tablet 100 mg, 100 mg, Oral, Nightly, Jeison Mcelroy MD, 100 mg at 07/28/25 2009    letrozole (FEMARA) tablet 2.5 mg, 2.5 mg, Oral, Daily, Jeison Mcelroy MD, 2.5 mg at 07/29/25 0850    Magnesium Standard Dose Replacement - Follow Nurse / BPA Driven Protocol, , Not Applicable, PRN, Jeison Mcelroy MD    metFORMIN ER (GLUCOPHAGE-XR) 24 hr tablet 1,000 mg, 1,000 mg, Oral, BID, Jeison Mcelroy MD, 1,000 mg at 07/29/25 0851    metoprolol tartrate (LOPRESSOR) tablet 25 mg, 25 mg, Oral, Q12H, Jeison Mcelroy MD, 25 mg at 07/28/25 2010    montelukast (SINGULAIR) tablet 10 mg, 10 mg, Oral, Nightly, Jeison Mcelroy MD, 10 mg at 07/28/25 2010     "nitroglycerin (NITROSTAT) SL tablet 0.4 mg, 0.4 mg, Sublingual, Q5 Min PRN, Jeison Mcelroy MD    nystatin (MYCOSTATIN) 382631 UNIT/GM cream 1 Application, 1 Application, Topical, Q12H, Mago Moralez MD, 1 Application at 07/29/25 0855    OLANZapine (zyPREXA) tablet 10 mg, 10 mg, Oral, Nightly, Jeison Mcelroy MD, 10 mg at 07/28/25 2009    pantoprazole (PROTONIX) EC tablet 40 mg, 40 mg, Oral, Daily, Jeison Mcelroy MD, 40 mg at 07/29/25 0851    Phosphorus Replacement - Follow Nurse / BPA Driven Protocol, , Not Applicable, PRNNavi Neil, MD    Potassium Replacement - Follow Nurse / BPA Driven Protocol, , Not Applicable, PRNavi WYMAN Neil, MD    sodium chloride 0.9 % flush 10 mL, 10 mL, Intravenous, Q12H, Jeison Mcelroy MD, 10 mL at 07/28/25 2014    sodium chloride 0.9 % flush 10 mL, 10 mL, Intravenous, PRN, Jeison Mcelroy MD    sodium chloride 0.9 % infusion 40 mL, 40 mL, Intravenous, PRN, Jeison Mcelroy MD    valsartan (DIOVAN) tablet 40 mg, 40 mg, Oral, Q24H, Jeison Mcelroy MD, 40 mg at 07/29/25 0852    Allergies:  Allergies   Allergen Reactions    Lithium Hives    Venlafaxine Hcl Er Hives    Latuda [Lurasidone] Hives    Vraylar [Cariprazine Hcl] Dizziness     Social History:   Social History     Tobacco Use    Smoking status: Every Day     Current packs/day: 0.50     Average packs/day: 0.5 packs/day for 0.6 years (0.3 ttl pk-yrs)     Types: Cigarettes     Start date: 2025    Smokeless tobacco: Never   Substance Use Topics    Alcohol use: Not Currently      Family History:  Family History   Problem Relation Age of Onset    Anxiety disorder Brother         Review of Systems  Reviewed 12 systems were reviewed, all negative except for those mentioned in HPI    Objective:  Vitals:   /55 (BP Location: Right arm, Patient Position: Lying)   Pulse 63   Temp 98.2 °F (36.8 °C) (Oral)   Resp 12   Ht 162.6 cm (64\")   Wt 113 kg (250 lb 3.6 oz)   SpO2 90%   BMI 42.95 kg/m²   I/O:     Intake/Output Summary (Last 24 hours) at 7/29/2025 " 1210  Last data filed at 7/29/2025 0954  Gross per 24 hour   Intake 1200 ml   Output 1300 ml   Net -100 ml       Exam:  General Appearance:  Alert  Head:  Normocephalic, without obvious abnormality, atraumatic  Eyes:  PERRL, conjunctiva/corneas clear     Neck:  Supple,  no adenopathy;      Lungs:  Decreased BS occasion ronchi  Heart:  Regular rate and rhythm, S1 and S2 normal  Abdomen:  Soft, non-tender, bowel sounds active   Extremities: trace edema  Pulses: 2+ and symmetric all extremities  Skin:  No rashes or lesions  Data Review:  All labs (24hrs):   Recent Results (from the past 24 hours)   POC Glucose Once    Collection Time: 07/28/25  4:20 PM    Specimen: Blood   Result Value Ref Range    Glucose 107 (H) 70 - 105 mg/dL   Sodium    Collection Time: 07/28/25  4:21 PM    Specimen: Arm, Right; Blood   Result Value Ref Range    Sodium 125 (L) 136 - 145 mmol/L   Basic Metabolic Panel    Collection Time: 07/29/25  5:15 AM    Specimen: Arm, Right; Blood   Result Value Ref Range    Glucose 98 65 - 99 mg/dL    BUN 18.2 8.0 - 23.0 mg/dL    Creatinine 1.16 (H) 0.57 - 1.00 mg/dL    Sodium 124 (L) 136 - 145 mmol/L    Potassium 4.4 3.5 - 5.2 mmol/L    Chloride 89 (L) 98 - 107 mmol/L    CO2 25.6 22.0 - 29.0 mmol/L    Calcium 8.7 8.6 - 10.5 mg/dL    BUN/Creatinine Ratio 15.7 7.0 - 25.0    Anion Gap 9.4 5.0 - 15.0 mmol/L    eGFR 51.8 (L) >60.0 mL/min/1.73   CBC Auto Differential    Collection Time: 07/29/25  5:15 AM    Specimen: Arm, Right; Blood   Result Value Ref Range    WBC 5.80 3.40 - 10.80 10*3/mm3    RBC 3.26 (L) 3.77 - 5.28 10*6/mm3    Hemoglobin 8.9 (L) 12.0 - 15.9 g/dL    Hematocrit 28.1 (L) 34.0 - 46.6 %    MCV 86.2 79.0 - 97.0 fL    MCH 27.3 26.6 - 33.0 pg    MCHC 31.7 31.5 - 35.7 g/dL    RDW 13.9 12.3 - 15.4 %    RDW-SD 43.3 37.0 - 54.0 fl    MPV 9.2 6.0 - 12.0 fL    Platelets 384 140 - 450 10*3/mm3    Neutrophil % 57.9 42.7 - 76.0 %    Lymphocyte % 25.9 19.6 - 45.3 %    Monocyte % 6.9 5.0 - 12.0 %    Eosinophil %  7.8 (H) 0.3 - 6.2 %    Basophil % 1.0 0.0 - 1.5 %    Immature Grans % 0.5 0.0 - 0.5 %    Neutrophils, Absolute 3.36 1.70 - 7.00 10*3/mm3    Lymphocytes, Absolute 1.50 0.70 - 3.10 10*3/mm3    Monocytes, Absolute 0.40 0.10 - 0.90 10*3/mm3    Eosinophils, Absolute 0.45 (H) 0.00 - 0.40 10*3/mm3    Basophils, Absolute 0.06 0.00 - 0.20 10*3/mm3    Immature Grans, Absolute 0.03 0.00 - 0.05 10*3/mm3    nRBC 0.0 0.0 - 0.2 /100 WBC   Sodium    Collection Time: 07/29/25 11:41 AM    Specimen: Arm, Right; Blood   Result Value Ref Range    Sodium 123 (L) 136 - 145 mmol/L       Current Facility-Administered Medications:     acetaminophen (TYLENOL) tablet 650 mg, 650 mg, Oral, Q6H PRN, Jeison Mcelroy MD, 650 mg at 07/29/25 0852    albuterol (PROVENTIL) nebulizer solution 0.083% 2.5 mg/3mL, 2.5 mg, Nebulization, Q4H PRN, Jeison Mcelroy MD    ALPRAZolam (XANAX) tablet 2 mg, 2 mg, Oral, TID PRN, Jeison Mcelroy MD, 2 mg at 07/29/25 0850    budesonide (PULMICORT) nebulizer solution 0.5 mg, 0.5 mg, Nebulization, BID - RT, 0.5 mg at 07/29/25 0844 **AND** arformoterol (BROVANA) nebulizer solution 15 mcg, 15 mcg, Nebulization, BID - RT, Jeison Mcelroy MD, 15 mcg at 07/29/25 0844    aspirin EC tablet 81 mg, 81 mg, Oral, Daily, Jeison Mcelroy MD, 81 mg at 07/29/25 0851    atorvastatin (LIPITOR) tablet 80 mg, 80 mg, Oral, Daily, Jeison Mcelroy MD, 80 mg at 07/29/25 0853    sennosides-docusate (PERICOLACE) 8.6-50 MG per tablet 2 tablet, 2 tablet, Oral, BID PRN **AND** polyethylene glycol (MIRALAX) packet 17 g, 17 g, Oral, Daily PRN **AND** bisacodyl (DULCOLAX) EC tablet 5 mg, 5 mg, Oral, Daily PRN **AND** bisacodyl (DULCOLAX) suppository 10 mg, 10 mg, Rectal, Daily PRN, Jeison Mcelroy MD    Calcium Replacement - Follow Nurse / BPA Driven Protocol, , Not Applicable, PRN, Jeison Mcelroy MD    cetirizine (zyrTEC) tablet 10 mg, 10 mg, Oral, Nightly, Jeison Mcelroy MD, 10 mg at 07/28/25 2010    DULoxetine (CYMBALTA) DR capsule 60 mg, 60 mg, Oral, Nightly, Jeison Mcelroy MD, 60  mg at 07/28/25 2010    enoxaparin sodium (LOVENOX) syringe 40 mg, 40 mg, Subcutaneous, Q12H, Mago Moralez MD, 40 mg at 07/29/25 0530    fenofibrate (TRICOR) tablet 48 mg, 48 mg, Oral, Daily, Jeison Mcelroy MD, 48 mg at 07/29/25 0850    gabapentin (NEURONTIN) capsule 400 mg, 400 mg, Oral, TID, Jeison Mcelroy MD, 400 mg at 07/29/25 0850    hydrALAZINE (APRESOLINE) tablet 25 mg, 25 mg, Oral, Q8H, Jeison Mcelroy MD, 25 mg at 07/29/25 0523    lamoTRIgine (LaMICtal) tablet 100 mg, 100 mg, Oral, Nightly, Jeison Mcelroy MD, 100 mg at 07/28/25 2009    letrozole (FEMARA) tablet 2.5 mg, 2.5 mg, Oral, Daily, Jeison Mcelroy MD, 2.5 mg at 07/29/25 0850    Magnesium Standard Dose Replacement - Follow Nurse / BPA Driven Protocol, , Not Applicable, PRN, Jeison Mcelroy MD    metFORMIN ER (GLUCOPHAGE-XR) 24 hr tablet 1,000 mg, 1,000 mg, Oral, BID, Jeison Mcelroy MD, 1,000 mg at 07/29/25 0851    metoprolol tartrate (LOPRESSOR) tablet 25 mg, 25 mg, Oral, Q12H, Jeison Mcelroy MD, 25 mg at 07/28/25 2010    montelukast (SINGULAIR) tablet 10 mg, 10 mg, Oral, Nightly, Jeison Mcelroy MD, 10 mg at 07/28/25 2010    nitroglycerin (NITROSTAT) SL tablet 0.4 mg, 0.4 mg, Sublingual, Q5 Min PRN, Jeison Mcelroy MD    nystatin (MYCOSTATIN) [id number] UNIT/GM cream 1 Application, 1 Application, Topical, Q12H, Mago Moralez MD, 1 Application at 07/29/25 0855    OLANZapine (zyPREXA) tablet 10 mg, 10 mg, Oral, Nightly, Jeison Mcelroy MD, 10 mg at 07/28/25 2009    pantoprazole (PROTONIX) EC tablet 40 mg, 40 mg, Oral, Daily, Jeison Mcelroy MD, 40 mg at 07/29/25 0851    Phosphorus Replacement - Follow Nurse / BPA Driven Protocol, , Not Applicable, PRNNavi Neil, MD    Potassium Replacement - Follow Nurse / BPA Driven Protocol, , Not Applicable, PRZAMZAM, Jeison Mcelroy MD    sodium chloride 0.9 % flush 10 mL, 10 mL, Intravenous, Q12H, Jeison Mcelroy MD, 10 mL at 07/28/25 2014    sodium chloride 0.9 % flush 10 mL, 10 mL, Intravenous, PRN, Jeison Mcelroy MD    sodium chloride 0.9 % infusion 40 mL, 40  mL, Intravenous, PRN, Jeison Mcelroy MD    valsartan (DIOVAN) tablet 40 mg, 40 mg, Oral, Q24H, Jeison Mcelroy MD, 40 mg at 07/29/25 0852    Assessment:  Hyponatremia possibly due to decreased p.o. intake with low urine sodium  Add-recheck labs later  today  Acute kidney injury with low urine sodium  Hypomagnesemia  COPD  Status post fall  Diabetes  Hypertension  Bipolar disorder      Recommendations:  Patient is a 65-year-old female patient with multiple medical problems presenting with fall and was found to have low sodium is been getting worse over the last 36 hours  Noted urine sodium and osmolarity  Add NS at 75 cc an hour  Add salt pills  Recheck labs later today no need for 3% saline    Luzma Rahman MD  7/29/2025  12:10 EDT         Home

## 2025-07-29 NOTE — PLAN OF CARE
Problem: Adult Inpatient Plan of Care  Goal: Plan of Care Review  Outcome: Progressing  Goal: Patient-Specific Goal (Individualized)  Outcome: Progressing  Goal: Absence of Hospital-Acquired Illness or Injury  Outcome: Progressing  Goal: Optimal Comfort and Wellbeing  Outcome: Progressing  Goal: Readiness for Transition of Care  Outcome: Progressing     Problem: Fall Injury Risk  Goal: Absence of Fall and Fall-Related Injury  Outcome: Progressing     Problem: Pain Acute  Goal: Optimal Pain Control and Function  Outcome: Progressing     Problem: Comorbidity Management  Goal: Maintenance of Behavioral Health Symptom Control  Outcome: Progressing  Goal: Blood Glucose Level Within Target Range  Outcome: Progressing  Goal: Blood Pressure in Desired Range  Outcome: Progressing   Goal Outcome Evaluation:

## 2025-07-29 NOTE — PLAN OF CARE
Goal Outcome Evaluation:      Pt is resting quietly abed with eyes closed at this time. No s/sx of any distress are noted. Plan of care is ongoing.

## 2025-07-29 NOTE — PLAN OF CARE
Goal Outcome Evaluation:   Patient resting up in chair most of day, able to make needs known, pain and anxiety treated per MAR, no signs of distress, will dc home when cleared, care plan ongoing

## 2025-07-29 NOTE — PROGRESS NOTES
[facility] MEDICINE SERVICE  DAILY PROGRESS NOTE    NAME: Jayda Mclean  : 1960  MRN: [id number]      LOS: 2 days     PROVIDER OF SERVICE: Mago Moralez MD    Chief Complaint: Fall    Subjective:     Interval History:    Patient seen and evaluated at bedside. No new complaints. Continues to have chronic knee pain.     Treatment plan discussed with patient. All questions addressed.     Review of Systems:   Denies fevers, chills  Denies chest pain, edema  Denies shortness of breath, cough  Denies nausea, vomiting, diarrhea  +knee pain, right worse than left    Objective:     Vital Signs  Temp:  [97.7 °F (36.5 °C)-98.3 °F (36.8 °C)] 98.2 °F (36.8 °C)  Heart Rate:  [57-67] 65  Resp:  [10-18] 12  BP: (118-162)/(44-65) 129/55   Body mass index is 42.95 kg/m².    Physical Exam   General: No acute distress, alert and oriented  CV: RRR, + peripheral edema  Pulm: CTAB, no increased work of breathing  Abd: Soft, nontender, nondistended  Skin: No rashes or lesions on exposed skin  Psych: Appropriate mood and affect    Scheduled Meds   budesonide, 0.5 mg, Nebulization, BID - RT   And  arformoterol, 15 mcg, Nebulization, BID - RT  aspirin, 81 mg, Oral, Daily  atorvastatin, 80 mg, Oral, Daily  cetirizine, 10 mg, Oral, Nightly  DULoxetine, 60 mg, Oral, Nightly  enoxaparin sodium, 40 mg, Subcutaneous, Q12H  fenofibrate, 48 mg, Oral, Daily  gabapentin, 400 mg, Oral, TID  hydrALAZINE, 25 mg, Oral, Q8H  lamoTRIgine, 100 mg, Oral, Nightly  letrozole, 2.5 mg, Oral, Daily  metFORMIN ER, 1,000 mg, Oral, BID  metoprolol tartrate, 25 mg, Oral, Q12H  montelukast, 10 mg, Oral, Nightly  nystatin, 1 Application, Topical, Q12H  OLANZapine, 10 mg, Oral, Nightly  pantoprazole, 40 mg, Oral, Daily  sodium chloride, 10 mL, Intravenous, Q12H  valsartan, 40 mg, Oral, Q24H       PRN Meds     acetaminophen    albuterol    ALPRAZolam    senna-docusate sodium **AND** polyethylene glycol **AND** bisacodyl **AND** bisacodyl    Calcium  Replacement - Follow Nurse / BPA Driven Protocol    Magnesium Standard Dose Replacement - Follow Nurse / BPA Driven Protocol    nitroglycerin    Phosphorus Replacement - Follow Nurse / BPA Driven Protocol    Potassium Replacement - Follow Nurse / BPA Driven Protocol    sodium chloride    sodium chloride   Infusions         Diagnostic Data    Results from last 7 days   Lab Units 07/29/25  0515 07/28/25  0115 07/27/25 1956   WBC 10*3/mm3 5.80   < > 9.07   HEMOGLOBIN g/dL 8.9*   < > 10.6*   HEMATOCRIT % 28.1*   < > 32.3*   PLATELETS 10*3/mm3 384   < > 418   GLUCOSE mg/dL 98   < > 135*   CREATININE mg/dL 1.16*   < > 0.79   BUN mg/dL 18.2   < > 12.3   SODIUM mmol/L 124*   < > 123*   POTASSIUM mmol/L 4.4   < > 3.9   AST (SGOT) U/L  --   --  17   ALT (SGPT) U/L  --   --  17   ALK PHOS U/L  --   --  79   BILIRUBIN mg/dL  --   --  0.3   ANION GAP mmol/L 9.4   < > 13.6    < > = values in this interval not displayed.       CT Head Without Contrast  Result Date: 7/27/2025  Impression: No acute intracranial process. Soft tissue swelling present within the right parietal scalp. Electronically Signed: Maria Elena Del Valle MD  7/27/2025 8:51 PM EDT  Workstation ID: [id number]    XR Knee 3 View Left  Result Date: 7/27/2025  Impression: No acute osseous abnormality. Moderate osteoarthritic changes are present. No joint effusion. Electronically Signed: Maria Elena Del Valle MD  7/27/2025 8:22 PM EDT  Workstation ID: [id number]      Interval results reviewed.    Assessment/Plan:     Generalized weakness  Falls   Osteoarthritis of bilateral lower extremities  - Outpatient ortho f/u  - PT/OT, rec'd home with home health     Hyponatremia  - Nephrology consulted, appreciate recs  - IVF, salt tabs per nephrology  - Repeat labs pending    Hypomagnesemia  - Replete, repeat labs in AM     COPD  - Not in exacerbation     HTN  - Home meds     T2DM  - Home meds     BPD/depression/PTSD  - Home medicines as appropriate if Na continues to improve to  baseline    Treatment plan discussed with nursing staff, case management and pharmacy.     VTE Prophylaxis:  Pharmacologic & mechanical VTE prophylaxis orders are present.    Code status is   Code Status and Medical Interventions: CPR (Attempt to Resuscitate); Full Support   Ordered at: 07/27/25 2224     Code Status (Patient has no pulse and is not breathing):    CPR (Attempt to Resuscitate)     Medical Interventions (Patient has pulse or is breathing):    Full Support     Level Of Support Discussed With:    Patient       Plan for disposition: Home 7/30/25    Barriers to discharge: Nephrology consult, repeat labs pending    Time: 35+ minutes     Signature: Electronically signed by Mago Moralez MD, 07/29/25, 09:11 EDT.  [facility] Hospitalist Team

## 2025-07-30 LAB
GLUCOSE BLDC GLUCOMTR-MCNC: 123 MG/DL (ref 70–105)
MAGNESIUM SERPL-MCNC: 1.9 MG/DL (ref 1.6–2.4)
SODIUM SERPL-SCNC: 126 MMOL/L (ref 136–145)
SODIUM SERPL-SCNC: 128 MMOL/L (ref 136–145)
SODIUM SERPL-SCNC: 133 MMOL/L (ref 136–145)

## 2025-07-30 PROCEDURE — 82948 REAGENT STRIP/BLOOD GLUCOSE: CPT

## 2025-07-30 PROCEDURE — 84295 ASSAY OF SERUM SODIUM: CPT | Performed by: STUDENT IN AN ORGANIZED HEALTH CARE EDUCATION/TRAINING PROGRAM

## 2025-07-30 PROCEDURE — 25010000002 ENOXAPARIN PER 10 MG: Performed by: STUDENT IN AN ORGANIZED HEALTH CARE EDUCATION/TRAINING PROGRAM

## 2025-07-30 PROCEDURE — 97116 GAIT TRAINING THERAPY: CPT

## 2025-07-30 PROCEDURE — 25010000002 MAGNESIUM SULFATE 2 GM/50ML SOLUTION: Performed by: STUDENT IN AN ORGANIZED HEALTH CARE EDUCATION/TRAINING PROGRAM

## 2025-07-30 PROCEDURE — 97530 THERAPEUTIC ACTIVITIES: CPT

## 2025-07-30 PROCEDURE — 94799 UNLISTED PULMONARY SVC/PX: CPT

## 2025-07-30 PROCEDURE — 84295 ASSAY OF SERUM SODIUM: CPT | Performed by: INTERNAL MEDICINE

## 2025-07-30 PROCEDURE — 97110 THERAPEUTIC EXERCISES: CPT

## 2025-07-30 PROCEDURE — 83735 ASSAY OF MAGNESIUM: CPT | Performed by: STUDENT IN AN ORGANIZED HEALTH CARE EDUCATION/TRAINING PROGRAM

## 2025-07-30 PROCEDURE — 25810000003 SODIUM CHLORIDE 0.9 % SOLUTION: Performed by: INTERNAL MEDICINE

## 2025-07-30 PROCEDURE — 94761 N-INVAS EAR/PLS OXIMETRY MLT: CPT

## 2025-07-30 RX ORDER — SODIUM CHLORIDE 1 G/1
1 TABLET ORAL 2 TIMES DAILY WITH MEALS
Status: DISCONTINUED | OUTPATIENT
Start: 2025-07-30 | End: 2025-07-31

## 2025-07-30 RX ORDER — MAGNESIUM SULFATE HEPTAHYDRATE 40 MG/ML
2 INJECTION, SOLUTION INTRAVENOUS
Status: DISPENSED | OUTPATIENT
Start: 2025-07-30 | End: 2025-07-30

## 2025-07-30 RX ADMIN — ARFORMOTEROL TARTRATE 15 MCG: 15 SOLUTION RESPIRATORY (INHALATION) at 19:40

## 2025-07-30 RX ADMIN — METFORMIN HYDROCHLORIDE 1000 MG: 500 TABLET, EXTENDED RELEASE ORAL at 08:35

## 2025-07-30 RX ADMIN — METOPROLOL TARTRATE 25 MG: 25 TABLET, FILM COATED ORAL at 08:35

## 2025-07-30 RX ADMIN — ASPIRIN 81 MG: 81 TABLET, COATED ORAL at 08:35

## 2025-07-30 RX ADMIN — Medication 1 G: at 11:54

## 2025-07-30 RX ADMIN — GABAPENTIN 400 MG: 400 CAPSULE ORAL at 20:40

## 2025-07-30 RX ADMIN — GABAPENTIN 400 MG: 400 CAPSULE ORAL at 08:35

## 2025-07-30 RX ADMIN — OLANZAPINE 10 MG: 5 TABLET, FILM COATED ORAL at 20:40

## 2025-07-30 RX ADMIN — ATORVASTATIN CALCIUM 80 MG: 40 TABLET, FILM COATED ORAL at 08:35

## 2025-07-30 RX ADMIN — HYDRALAZINE HYDROCHLORIDE 25 MG: 25 TABLET ORAL at 11:54

## 2025-07-30 RX ADMIN — NYSTATIN 1 APPLICATION: 100000 CREAM TOPICAL at 20:44

## 2025-07-30 RX ADMIN — DULOXETINE 60 MG: 30 CAPSULE, DELAYED RELEASE ORAL at 20:40

## 2025-07-30 RX ADMIN — SODIUM CHLORIDE 100 ML/HR: 9 INJECTION, SOLUTION INTRAVENOUS at 21:34

## 2025-07-30 RX ADMIN — ENOXAPARIN SODIUM 40 MG: 100 INJECTION SUBCUTANEOUS at 17:14

## 2025-07-30 RX ADMIN — NYSTATIN 1 APPLICATION: 100000 CREAM TOPICAL at 08:37

## 2025-07-30 RX ADMIN — LAMOTRIGINE 100 MG: 100 TABLET ORAL at 20:40

## 2025-07-30 RX ADMIN — BUDESONIDE 0.5 MG: 0.5 SUSPENSION RESPIRATORY (INHALATION) at 08:14

## 2025-07-30 RX ADMIN — ALPRAZOLAM 2 MG: 1 TABLET ORAL at 08:35

## 2025-07-30 RX ADMIN — ARFORMOTEROL TARTRATE 15 MCG: 15 SOLUTION RESPIRATORY (INHALATION) at 08:10

## 2025-07-30 RX ADMIN — LETROZOLE 2.5 MG: 2.5 TABLET ORAL at 08:35

## 2025-07-30 RX ADMIN — METOPROLOL TARTRATE 25 MG: 25 TABLET, FILM COATED ORAL at 20:40

## 2025-07-30 RX ADMIN — ACETAMINOPHEN 650 MG: 325 TABLET, FILM COATED ORAL at 13:57

## 2025-07-30 RX ADMIN — MONTELUKAST 10 MG: 10 TABLET, FILM COATED ORAL at 20:40

## 2025-07-30 RX ADMIN — GABAPENTIN 400 MG: 400 CAPSULE ORAL at 17:13

## 2025-07-30 RX ADMIN — HYDRALAZINE HYDROCHLORIDE 25 MG: 25 TABLET ORAL at 06:16

## 2025-07-30 RX ADMIN — Medication 10 ML: at 20:44

## 2025-07-30 RX ADMIN — FENOFIBRATE 48 MG: 48 TABLET ORAL at 08:37

## 2025-07-30 RX ADMIN — METFORMIN HYDROCHLORIDE 1000 MG: 500 TABLET, EXTENDED RELEASE ORAL at 20:40

## 2025-07-30 RX ADMIN — ENOXAPARIN SODIUM 40 MG: 100 INJECTION SUBCUTANEOUS at 06:07

## 2025-07-30 RX ADMIN — MAGNESIUM SULFATE HEPTAHYDRATE 2 G: 40 INJECTION, SOLUTION INTRAVENOUS at 01:32

## 2025-07-30 RX ADMIN — VALSARTAN 40 MG: 80 TABLET ORAL at 08:35

## 2025-07-30 RX ADMIN — Medication 10 ML: at 08:37

## 2025-07-30 RX ADMIN — HYDRALAZINE HYDROCHLORIDE 25 MG: 25 TABLET ORAL at 20:40

## 2025-07-30 RX ADMIN — PANTOPRAZOLE SODIUM 40 MG: 40 TABLET, DELAYED RELEASE ORAL at 08:35

## 2025-07-30 RX ADMIN — Medication 1 G: at 17:13

## 2025-07-30 RX ADMIN — ALPRAZOLAM 2 MG: 1 TABLET ORAL at 17:13

## 2025-07-30 RX ADMIN — ACETAMINOPHEN 650 MG: 325 TABLET, FILM COATED ORAL at 20:40

## 2025-07-30 RX ADMIN — BUDESONIDE 0.5 MG: 0.5 SUSPENSION RESPIRATORY (INHALATION) at 19:45

## 2025-07-30 RX ADMIN — CETIRIZINE HYDROCHLORIDE 10 MG: 10 TABLET, FILM COATED ORAL at 20:40

## 2025-07-30 RX ADMIN — MAGNESIUM SULFATE HEPTAHYDRATE 2 G: 40 INJECTION, SOLUTION INTRAVENOUS at 06:06

## 2025-07-30 NOTE — PROGRESS NOTES
[facility] MEDICINE SERVICE  DAILY PROGRESS NOTE    NAME: Jayda Mclean  : 1960  MRN: [id number]      LOS: 3 days     PROVIDER OF SERVICE: Mago Moralez MD    Chief Complaint: Fall    Subjective:     Interval History:    Patient seen and evaluated at bedside. No new complaints.     Treatment plan discussed with patient. All questions addressed.     Review of Systems:   Denies fevers, chills  Denies chest pain, edema  Denies shortness of breath, cough  Denies nausea, vomiting, diarrhea  +knee pain, right worse than left    Objective:     Vital Signs  Temp:  [97.6 °F (36.4 °C)-99.1 °F (37.3 °C)] 99.1 °F (37.3 °C)  Heart Rate:  [61-74] 74  Resp:  [10-25] 16  BP: (109-192)/(45-62) 147/45   Body mass index is 42.95 kg/m².    Physical Exam   General: No acute distress, alert and oriented  CV: RRR, + peripheral edema  Pulm: CTAB, no increased work of breathing  Abd: Soft, nontender, nondistended  Skin: No rashes or lesions on exposed skin  Psych: Appropriate mood and affect    Scheduled Meds   budesonide, 0.5 mg, Nebulization, BID - RT   And  arformoterol, 15 mcg, Nebulization, BID - RT  aspirin, 81 mg, Oral, Daily  atorvastatin, 80 mg, Oral, Daily  cetirizine, 10 mg, Oral, Nightly  DULoxetine, 60 mg, Oral, Nightly  enoxaparin sodium, 40 mg, Subcutaneous, Q12H  fenofibrate, 48 mg, Oral, Daily  gabapentin, 400 mg, Oral, TID  hydrALAZINE, 25 mg, Oral, Q8H  lamoTRIgine, 100 mg, Oral, Nightly  letrozole, 2.5 mg, Oral, Daily  metFORMIN ER, 1,000 mg, Oral, BID  metoprolol tartrate, 25 mg, Oral, Q12H  montelukast, 10 mg, Oral, Nightly  nystatin, 1 Application, Topical, Q12H  OLANZapine, 10 mg, Oral, Nightly  pantoprazole, 40 mg, Oral, Daily  sodium chloride, 10 mL, Intravenous, Q12H  valsartan, 40 mg, Oral, Q24H       PRN Meds     acetaminophen    albuterol    ALPRAZolam    senna-docusate sodium **AND** polyethylene glycol **AND** bisacodyl **AND** bisacodyl    Calcium Replacement - Follow Nurse / BPA Driven  Protocol    Magnesium Standard Dose Replacement - Follow Nurse / BPA Driven Protocol    nitroglycerin    Phosphorus Replacement - Follow Nurse / BPA Driven Protocol    Potassium Replacement - Follow Nurse / BPA Driven Protocol    sodium chloride    sodium chloride   Infusions  sodium chloride, 100 mL/hr, Last Rate: 100 mL/hr (07/29/25 2222)          Diagnostic Data    Results from last 7 days   Lab Units 07/30/25  0604 07/29/25  2313 07/28/25  0115 07/27/25  1956   WBC 10*3/mm3  --  7.42   < > 9.07   HEMOGLOBIN g/dL  --  9.1*   < > 10.6*   HEMATOCRIT %  --  27.6*   < > 32.3*   PLATELETS 10*3/mm3  --  376   < > 418   GLUCOSE mg/dL  --  110*   < > 135*   CREATININE mg/dL  --  0.75   < > 0.79   BUN mg/dL  --  17.9   < > 12.3   SODIUM mmol/L 126* 123*   < > 123*   POTASSIUM mmol/L  --  4.3   < > 3.9   AST (SGOT) U/L  --   --   --  17   ALT (SGPT) U/L  --   --   --  17   ALK PHOS U/L  --   --   --  79   BILIRUBIN mg/dL  --   --   --  0.3   ANION GAP mmol/L  --  8.1   < > 13.6    < > = values in this interval not displayed.       No radiology results for the last day      Interval results reviewed.    Assessment/Plan:     Generalized weakness  Falls   Osteoarthritis of bilateral lower extremities  - Outpatient ortho f/u  - PT/OT, rec'd home with home health     Hyponatremia  - Nephrology consulted, appreciate recs  - IVF, salt tabs per nephrology  - Continues to be hyponatremic with some improvement  - Repeat labs pending    Hypomagnesemia  - Replete, repeat labs in AM     COPD  - Not in exacerbation     HTN  - Home meds     T2DM  - Home meds     BPD/depression/PTSD  - Home medicines as appropriate if Na continues to improve to baseline    Treatment plan discussed with nursing staff, case management and pharmacy.     VTE Prophylaxis:  Pharmacologic & mechanical VTE prophylaxis orders are present.    Code status is   Code Status and Medical Interventions: CPR (Attempt to Resuscitate); Full Support   Ordered at: 07/27/25  2224     Code Status (Patient has no pulse and is not breathing):    CPR (Attempt to Resuscitate)     Medical Interventions (Patient has pulse or is breathing):    Full Support     Level Of Support Discussed With:    Patient       Plan for disposition: Home 7/31/25    Barriers to discharge: Nephrology consulted, repeat labs pending    Time: 35+ minutes     Signature: Electronically signed by Mago Moralez MD, 07/30/25, 09:24 EDT.  [facility] Hospitalist Team

## 2025-07-30 NOTE — THERAPY TREATMENT NOTE
"Subjective: Pt agreeable to therapeutic plan of care.    Objective:     Bed mobility - N/A or Not attempted.    Transfers - STS SBA using RW.     Ambulation - 30 feet + 80 feet CGA using RW.     Therapeutic Exercise - 15 Reps B LE AROM supported sitting / chair    Vitals: WNL    Pain: 5 VAS Location: B knees  Intervention for pain: Repositioned, Increased Activity, and Therapeutic Presence    Education: Provided education on the importance of mobility in the acute care setting, Verbal/Tactile Cues, Transfer Training, and Gait Training    Assessment: Jayda Mclean presents with functional mobility impairments which indicate the need for skilled intervention. Tolerating session today without incident. Gait distance limited by B knee pain. Will continue to follow and progress as tolerated.     Plan/Recommendations:   If medically appropriate, Low Intensity Therapy recommended post-acute care - This is recommended as therapy feels this patient would require 2-3 visits per week. OP or HH would be the best option depending on patient's home bound status. Consider, if the patient has other  \"skilled\" needs such as wounds, IV antibiotics, etc. Combined with \"low intensity\" could also equate to a SNF. If patient is medically complex, consider LTAC. Pt requires no DME at discharge.     Pt desires Home with Home Health at discharge. Pt cooperative; agreeable to therapeutic recommendations and plan of care.         Basic Mobility 6-click:  Rollin = Total, A lot = 2, A little = 3; 4 = None  Supine>Sit:   1 = Total, A lot = 2, A little = 3; 4 = None   Sit>Stand with arms:  1 = Total, A lot = 2, A little = 3; 4 = None  Bed>Chair:   1 = Total, A lot = 2, A little = 3; 4 = None  Ambulate in room:  1 = Total, A lot = 2, A little = 3; 4 = None  3-5 Steps with railin = Total, A lot = 2, A little = 3; 4 = None  Score: 21    Modified Joesph: N/A = No pre-op stroke/TIA    Post-Tx Position: Up in Chair, Alarms activated, " and Call light and personal items within reach  PPE: gloves    Therapy Charges for Today       Code Description Service Date Service Provider Modifiers Qty    08486680039 HC PT THERAPEUTIC ACT EA 15 MIN 7/30/2025 Billy Card, GHAZAL GP 1    98640433332 HC GAIT TRAINING EA 15 MIN 7/30/2025 Billy Card, GHAZAL GP 1    93816641115 HC PT THER PROC EA 15 MIN 7/30/2025 Billy Card, GHAZAL GP 1           PT Charges       Row Name 07/30/25 1100             Time Calculation    Start Time 1020  -UN      Stop Time 1050  -UN      Time Calculation (min) 30 min  -UN      PT Received On 07/30/25  -UN      PT - Next Appointment 07/31/25  -UN         Time Calculation- PT    Total Timed Code Minutes- PT 30 minute(s)  -UN                User Key  (r) = Recorded By, (t) = Taken By, (c) = Cosigned By      Initials Name Provider Type    UN Billy Card PTA Physical Therapist Assistant

## 2025-07-30 NOTE — PLAN OF CARE
Goal Outcome Evaluation:      Patient was up in chair a majority of the day. Ambulated to the bathroom with walker and 1 assist, tolerated well. Diabetic diet tolerated well. Hyponatremia continued. VSS. Plan of care ongoing.

## 2025-07-30 NOTE — NURSING NOTE
Magnesium 1.9 after second dose of Mag 2 grams, ordered per protocol. Third dose of mag 2 grams discontinued per verbal order by Dr. Moralez.

## 2025-07-30 NOTE — PLAN OF CARE
"Assessment: Jayda Mclean presents with functional mobility impairments which indicate the need for skilled intervention. Tolerating session today without incident. Gait distance limited by B knee pain. Will continue to follow and progress as tolerated.     Plan/Recommendations:   If medically appropriate, Low Intensity Therapy recommended post-acute care - This is recommended as therapy feels this patient would require 2-3 visits per week. OP or HH would be the best option depending on patient's home bound status. Consider, if the patient has other  \"skilled\" needs such as wounds, IV antibiotics, etc. Combined with \"low intensity\" could also equate to a SNF. If patient is medically complex, consider LTAC. Pt requires no DME at discharge.     Pt desires Home with Home Health at discharge. Pt cooperative; agreeable to therapeutic recommendations and plan of care.     "

## 2025-07-30 NOTE — PLAN OF CARE
Problem: Adult Inpatient Plan of Care  Goal: Plan of Care Review  Outcome: Progressing  Goal: Patient-Specific Goal (Individualized)  Outcome: Progressing  Goal: Absence of Hospital-Acquired Illness or Injury  Outcome: Progressing  Intervention: Identify and Manage Fall Risk  Recent Flowsheet Documentation  Taken 7/30/2025 0600 by Violet Burnette RN  Safety Promotion/Fall Prevention:   nonskid shoes/slippers when out of bed   safety round/check completed  Intervention: Prevent Infection  Recent Flowsheet Documentation  Taken 7/30/2025 0600 by Violet Burnette RN  Infection Prevention:   hand hygiene promoted   single patient room provided  Goal: Optimal Comfort and Wellbeing  Outcome: Progressing  Goal: Readiness for Transition of Care  Outcome: Progressing     Problem: Fall Injury Risk  Goal: Absence of Fall and Fall-Related Injury  Outcome: Progressing  Intervention: Identify and Manage Contributors  Recent Flowsheet Documentation  Taken 7/30/2025 0600 by Violet Burnette RN  Medication Review/Management: medications reviewed     Problem: Pain Acute  Goal: Optimal Pain Control and Function  Outcome: Progressing  Intervention: Prevent or Manage Pain  Recent Flowsheet Documentation  Taken 7/30/2025 0600 by Violet Burnette RN  Medication Review/Management: medications reviewed     Problem: Comorbidity Management  Goal: Maintenance of Behavioral Health Symptom Control  Outcome: Progressing  Intervention: Maintain Behavioral Health Symptom Control  Recent Flowsheet Documentation  Taken 7/30/2025 0600 by Violet Burnette RN  Medication Review/Management: medications reviewed  Goal: Blood Glucose Level Within Target Range  Outcome: Progressing  Intervention: Monitor and Manage Glycemia  Recent Flowsheet Documentation  Taken 7/30/2025 0600 by Violet Burnette RN  Medication Review/Management: medications reviewed  Goal: Blood Pressure in Desired Range  Outcome: Progressing  Intervention: Maintain Blood Pressure Management  Recent  Flowsheet Documentation  Taken 7/30/2025 0600 by Violet Burnette, MICHAEL  Medication Review/Management: medications reviewed   Goal Outcome Evaluation:                                             COVID-19

## 2025-07-30 NOTE — PROGRESS NOTES
"                                                                                                                                      Nephrology  Progress Note                                        [facility]    Patient Identification    Name: Jayda Mclean  Age: 65 y.o.  Sex: female  :  1960  MRN: [id number]      DATE OF SERVICE:  2025        Subective    No new complaint     Objective   Scheduled Meds:budesonide, 0.5 mg, Nebulization, BID - RT   And  arformoterol, 15 mcg, Nebulization, BID - RT  aspirin, 81 mg, Oral, Daily  atorvastatin, 80 mg, Oral, Daily  cetirizine, 10 mg, Oral, Nightly  DULoxetine, 60 mg, Oral, Nightly  enoxaparin sodium, 40 mg, Subcutaneous, Q12H  fenofibrate, 48 mg, Oral, Daily  gabapentin, 400 mg, Oral, TID  hydrALAZINE, 25 mg, Oral, Q8H  lamoTRIgine, 100 mg, Oral, Nightly  letrozole, 2.5 mg, Oral, Daily  magnesium sulfate, 2 g, Intravenous, Q2H  metFORMIN ER, 1,000 mg, Oral, BID  metoprolol tartrate, 25 mg, Oral, Q12H  montelukast, 10 mg, Oral, Nightly  nystatin, 1 Application, Topical, Q12H  OLANZapine, 10 mg, Oral, Nightly  pantoprazole, 40 mg, Oral, Daily  sodium chloride, 10 mL, Intravenous, Q12H  valsartan, 40 mg, Oral, Q24H          Continuous Infusions:sodium chloride, 100 mL/hr, Last Rate: 100 mL/hr (25 2222)        PRN Meds:  acetaminophen    albuterol    ALPRAZolam    senna-docusate sodium **AND** polyethylene glycol **AND** bisacodyl **AND** bisacodyl    Calcium Replacement - Follow Nurse / BPA Driven Protocol    Magnesium Standard Dose Replacement - Follow Nurse / BPA Driven Protocol    nitroglycerin    Phosphorus Replacement - Follow Nurse / BPA Driven Protocol    Potassium Replacement - Follow Nurse / BPA Driven Protocol    sodium chloride    sodium chloride     Exam:  /55   Pulse 61   Temp 97.6 °F (36.4 °C) (Oral)   Resp 10   Ht 162.6 cm (64\")   Wt 113 kg (250 lb 3.6 oz)   SpO2 96%   BMI 42.95 kg/m²     Intake/Output last 3 " shifts:  I/O last 3 completed shifts:  In: 720 [P.O.:720]  Out: 3400 [Urine:3400]    Intake/Output this shift:  No intake/output data recorded.    Physical exam:  General Appearance:  Alert  Head:  Normocephalic, without obvious abnormality, atraumatic  Eyes:  PERRL, conjunctiva/corneas clear     Neck:  Supple,  no adenopathy;      Lungs:  Decreased BS occasion ronchi  Heart:  Regular rate and rhythm, S1 and S2 normal  Abdomen:  Soft, non-tender, bowel sounds active   Extremities: trace edema  Pulses: 2+ and symmetric all extremities  Skin:  No rashes or lesions       Data Review:  All labs (24hrs):   Recent Results (from the past 24 hours)   Sodium    Collection Time: 07/29/25 11:41 AM    Specimen: Arm, Right; Blood   Result Value Ref Range    Sodium 123 (L) 136 - 145 mmol/L   Basic Metabolic Panel    Collection Time: 07/29/25 11:41 AM    Specimen: Arm, Right; Blood   Result Value Ref Range    Glucose 80 65 - 99 mg/dL    BUN 17.5 8.0 - 23.0 mg/dL    Creatinine 1.02 (H) 0.57 - 1.00 mg/dL    Sodium 123 (L) 136 - 145 mmol/L    Potassium 4.7 3.5 - 5.2 mmol/L    Chloride 88 (L) 98 - 107 mmol/L    CO2 23.8 22.0 - 29.0 mmol/L    Calcium 9.0 8.6 - 10.5 mg/dL    BUN/Creatinine Ratio 17.2 7.0 - 25.0    Anion Gap 11.2 5.0 - 15.0 mmol/L    eGFR 60.4 >60.0 mL/min/1.73   Sodium, Urine, Random - Urine, Clean Catch    Collection Time: 07/29/25  2:47 PM    Specimen: Urine, Clean Catch   Result Value Ref Range    Sodium, Urine <20 mmol/L   Osmolality, Urine - Urine, Clean Catch    Collection Time: 07/29/25  2:47 PM    Specimen: Urine, Clean Catch   Result Value Ref Range    Osmolality, Urine 154 (L) 300 - 800 mOsm/kg   Basic Metabolic Panel    Collection Time: 07/29/25  5:38 PM    Specimen: Blood   Result Value Ref Range    Glucose 110 (H) 65 - 99 mg/dL    BUN 17.7 8.0 - 23.0 mg/dL    Creatinine 0.90 0.57 - 1.00 mg/dL    Sodium 119 (C) 136 - 145 mmol/L    Potassium 4.7 3.5 - 5.2 mmol/L    Chloride 86 (L) 98 - 107 mmol/L    CO2 24.8  22.0 - 29.0 mmol/L    Calcium 8.8 8.6 - 10.5 mg/dL    BUN/Creatinine Ratio 19.7 7.0 - 25.0    Anion Gap 8.2 5.0 - 15.0 mmol/L    eGFR 70.2 >60.0 mL/min/1.73   POC Glucose Once    Collection Time: 07/29/25  9:03 PM    Specimen: Blood   Result Value Ref Range    Glucose 103 70 - 105 mg/dL   Basic Metabolic Panel    Collection Time: 07/29/25 11:13 PM    Specimen: Arm, Right; Blood   Result Value Ref Range    Glucose 110 (H) 65 - 99 mg/dL    BUN 17.9 8.0 - 23.0 mg/dL    Creatinine 0.75 0.57 - 1.00 mg/dL    Sodium 123 (L) 136 - 145 mmol/L    Potassium 4.3 3.5 - 5.2 mmol/L    Chloride 91 (L) 98 - 107 mmol/L    CO2 23.9 22.0 - 29.0 mmol/L    Calcium 8.5 (L) 8.6 - 10.5 mg/dL    BUN/Creatinine Ratio 23.9 7.0 - 25.0    Anion Gap 8.1 5.0 - 15.0 mmol/L    eGFR 87.4 >60.0 mL/min/1.73   Magnesium    Collection Time: 07/29/25 11:13 PM    Specimen: Arm, Right; Blood   Result Value Ref Range    Magnesium 1.4 (L) 1.6 - 2.4 mg/dL   CBC Auto Differential    Collection Time: 07/29/25 11:13 PM    Specimen: Arm, Right; Blood   Result Value Ref Range    WBC 7.42 3.40 - 10.80 10*3/mm3    RBC 3.27 (L) 3.77 - 5.28 10*6/mm3    Hemoglobin 9.1 (L) 12.0 - 15.9 g/dL    Hematocrit 27.6 (L) 34.0 - 46.6 %    MCV 84.4 79.0 - 97.0 fL    MCH 27.8 26.6 - 33.0 pg    MCHC 33.0 31.5 - 35.7 g/dL    RDW 13.8 12.3 - 15.4 %    RDW-SD 42.8 37.0 - 54.0 fl    MPV 9.0 6.0 - 12.0 fL    Platelets 376 140 - 450 10*3/mm3    Neutrophil % 51.6 42.7 - 76.0 %    Lymphocyte % 33.0 19.6 - 45.3 %    Monocyte % 5.7 5.0 - 12.0 %    Eosinophil % 8.6 (H) 0.3 - 6.2 %    Basophil % 0.7 0.0 - 1.5 %    Immature Grans % 0.4 0.0 - 0.5 %    Neutrophils, Absolute 3.83 1.70 - 7.00 10*3/mm3    Lymphocytes, Absolute 2.45 0.70 - 3.10 10*3/mm3    Monocytes, Absolute 0.42 0.10 - 0.90 10*3/mm3    Eosinophils, Absolute 0.64 (H) 0.00 - 0.40 10*3/mm3    Basophils, Absolute 0.05 0.00 - 0.20 10*3/mm3    Immature Grans, Absolute 0.03 0.00 - 0.05 10*3/mm3    nRBC 0.0 0.0 - 0.2 /100 WBC   Sodium     Collection Time: 07/30/25  6:04 AM    Specimen: Arm, Right; Blood   Result Value Ref Range    Sodium 126 (L) 136 - 145 mmol/L   Magnesium    Collection Time: 07/30/25  6:04 AM    Specimen: Arm, Right; Blood   Result Value Ref Range    Magnesium 1.9 1.6 - 2.4 mg/dL          Imaging:  CT Head Without Contrast  Result Date: 7/27/2025  Impression: No acute intracranial process. Soft tissue swelling present within the right parietal scalp. Electronically Signed: Maria Elena Del Valle MD  7/27/2025 8:51 PM EDT  Workstation ID: [id number]    XR Knee 3 View Left  Result Date: 7/27/2025  Impression: No acute osseous abnormality. Moderate osteoarthritic changes are present. No joint effusion. Electronically Signed: Maria Elena Del Valle MD  7/27/2025 8:22 PM EDT  Workstation ID: [id number]      Assessment/Plan:     Fall    Near syncope         Hyponatremia possibly due to decreased p.o. intake with low urine sodium  Add-recheck labs later  today  Acute kidney injury with low urine sodium  Hypomagnesemia  COPD  Status post fall  Diabetes  Hypertension  Bipolar disorder        Recommendations: July 30  Sodium improving  Continue IV fluid  Reduce salt pill dose      Patient is a 65-year-old female patient with multiple medical problems presenting with fall and was found to have low sodium is been getting worse over the last 36 hours  Noted urine sodium and osmolarity  Add NS at 75 cc an hour  Add salt pills  Recheck labs later today no need for 3% saline

## 2025-07-31 ENCOUNTER — READMISSION MANAGEMENT (OUTPATIENT)
Dept: CALL CENTER | Facility: HOSPITAL | Age: 65
End: 2025-07-31
Payer: MEDICARE

## 2025-07-31 VITALS
BODY MASS INDEX: 42.72 KG/M2 | DIASTOLIC BLOOD PRESSURE: 58 MMHG | WEIGHT: 250.22 LBS | HEART RATE: 68 BPM | SYSTOLIC BLOOD PRESSURE: 160 MMHG | OXYGEN SATURATION: 99 % | HEIGHT: 64 IN | RESPIRATION RATE: 12 BRPM | TEMPERATURE: 98 F

## 2025-07-31 PROBLEM — E87.1 HYPONATREMIA: Status: ACTIVE | Noted: 2025-07-31

## 2025-07-31 LAB
ANION GAP SERPL CALCULATED.3IONS-SCNC: 6.1 MMOL/L (ref 5–15)
BUN SERPL-MCNC: 13.6 MG/DL (ref 8–23)
BUN/CREAT SERPL: 19.2 (ref 7–25)
CALCIUM SPEC-SCNC: 7.2 MG/DL (ref 8.6–10.5)
CHLORIDE SERPL-SCNC: 103 MMOL/L (ref 98–107)
CO2 SERPL-SCNC: 22.9 MMOL/L (ref 22–29)
CREAT SERPL-MCNC: 0.71 MG/DL (ref 0.57–1)
EGFRCR SERPLBLD CKD-EPI 2021: 93.3 ML/MIN/1.73
GLUCOSE SERPL-MCNC: 157 MG/DL (ref 65–99)
POTASSIUM SERPL-SCNC: 3.8 MMOL/L (ref 3.5–5.2)
SODIUM SERPL-SCNC: 132 MMOL/L (ref 136–145)
SODIUM SERPL-SCNC: 142 MMOL/L (ref 136–145)

## 2025-07-31 PROCEDURE — 94664 DEMO&/EVAL PT USE INHALER: CPT

## 2025-07-31 PROCEDURE — 97116 GAIT TRAINING THERAPY: CPT

## 2025-07-31 PROCEDURE — 94761 N-INVAS EAR/PLS OXIMETRY MLT: CPT

## 2025-07-31 PROCEDURE — 94799 UNLISTED PULMONARY SVC/PX: CPT

## 2025-07-31 PROCEDURE — 84295 ASSAY OF SERUM SODIUM: CPT | Performed by: STUDENT IN AN ORGANIZED HEALTH CARE EDUCATION/TRAINING PROGRAM

## 2025-07-31 PROCEDURE — 25010000002 ENOXAPARIN PER 10 MG: Performed by: STUDENT IN AN ORGANIZED HEALTH CARE EDUCATION/TRAINING PROGRAM

## 2025-07-31 PROCEDURE — 80048 BASIC METABOLIC PNL TOTAL CA: CPT | Performed by: INTERNAL MEDICINE

## 2025-07-31 PROCEDURE — 97535 SELF CARE MNGMENT TRAINING: CPT

## 2025-07-31 PROCEDURE — 97530 THERAPEUTIC ACTIVITIES: CPT

## 2025-07-31 RX ORDER — SODIUM CHLORIDE 1 G/1
1 TABLET ORAL DAILY
Status: DISCONTINUED | OUTPATIENT
Start: 2025-07-31 | End: 2025-07-31 | Stop reason: HOSPADM

## 2025-07-31 RX ORDER — HYDRALAZINE HYDROCHLORIDE 25 MG/1
50 TABLET, FILM COATED ORAL ONCE
Status: COMPLETED | OUTPATIENT
Start: 2025-07-31 | End: 2025-07-31

## 2025-07-31 RX ORDER — SODIUM CHLORIDE 1 G/1
1 TABLET ORAL DAILY
Qty: 7 TABLET | Refills: 0 | Status: SHIPPED | OUTPATIENT
Start: 2025-08-01 | End: 2025-08-08

## 2025-07-31 RX ORDER — METOPROLOL TARTRATE 25 MG/1
25 TABLET, FILM COATED ORAL EVERY 12 HOURS SCHEDULED
Qty: 60 TABLET | Refills: 0 | Status: SHIPPED | OUTPATIENT
Start: 2025-07-31 | End: 2025-08-30

## 2025-07-31 RX ORDER — HYDRALAZINE HYDROCHLORIDE 25 MG/1
50 TABLET, FILM COATED ORAL EVERY 8 HOURS SCHEDULED
Status: DISCONTINUED | OUTPATIENT
Start: 2025-07-31 | End: 2025-07-31 | Stop reason: HOSPADM

## 2025-07-31 RX ADMIN — ATORVASTATIN CALCIUM 80 MG: 40 TABLET, FILM COATED ORAL at 08:15

## 2025-07-31 RX ADMIN — NYSTATIN 1 APPLICATION: 100000 CREAM TOPICAL at 08:16

## 2025-07-31 RX ADMIN — HYDRALAZINE HYDROCHLORIDE 50 MG: 25 TABLET ORAL at 09:27

## 2025-07-31 RX ADMIN — FENOFIBRATE 48 MG: 48 TABLET ORAL at 08:13

## 2025-07-31 RX ADMIN — GABAPENTIN 400 MG: 400 CAPSULE ORAL at 08:15

## 2025-07-31 RX ADMIN — METFORMIN HYDROCHLORIDE 1000 MG: 500 TABLET, EXTENDED RELEASE ORAL at 08:15

## 2025-07-31 RX ADMIN — ACETAMINOPHEN 650 MG: 325 TABLET, FILM COATED ORAL at 05:30

## 2025-07-31 RX ADMIN — ARFORMOTEROL TARTRATE 15 MCG: 15 SOLUTION RESPIRATORY (INHALATION) at 06:55

## 2025-07-31 RX ADMIN — Medication 1 G: at 08:15

## 2025-07-31 RX ADMIN — ASPIRIN 81 MG: 81 TABLET, COATED ORAL at 08:14

## 2025-07-31 RX ADMIN — METOPROLOL TARTRATE 25 MG: 25 TABLET, FILM COATED ORAL at 08:14

## 2025-07-31 RX ADMIN — BUDESONIDE 0.5 MG: 0.5 SUSPENSION RESPIRATORY (INHALATION) at 06:55

## 2025-07-31 RX ADMIN — VALSARTAN 40 MG: 80 TABLET ORAL at 08:15

## 2025-07-31 RX ADMIN — Medication 10 ML: at 08:15

## 2025-07-31 RX ADMIN — ENOXAPARIN SODIUM 40 MG: 100 INJECTION SUBCUTANEOUS at 05:30

## 2025-07-31 RX ADMIN — PANTOPRAZOLE SODIUM 40 MG: 40 TABLET, DELAYED RELEASE ORAL at 08:15

## 2025-07-31 RX ADMIN — ALPRAZOLAM 2 MG: 1 TABLET ORAL at 05:30

## 2025-07-31 RX ADMIN — LETROZOLE 2.5 MG: 2.5 TABLET ORAL at 08:13

## 2025-07-31 NOTE — PROGRESS NOTES
"                                                                                                                                      Nephrology  Progress Note                                        [facility]    Patient Identification    Name: Jayda Mclean  Age: 65 y.o.  Sex: female  :  1960  MRN: [id number]      DATE OF SERVICE:  2025        Subective    No new complaint     Objective   Scheduled Meds:budesonide, 0.5 mg, Nebulization, BID - RT   And  arformoterol, 15 mcg, Nebulization, BID - RT  aspirin, 81 mg, Oral, Daily  atorvastatin, 80 mg, Oral, Daily  cetirizine, 10 mg, Oral, Nightly  DULoxetine, 60 mg, Oral, Nightly  enoxaparin sodium, 40 mg, Subcutaneous, Q12H  fenofibrate, 48 mg, Oral, Daily  gabapentin, 400 mg, Oral, TID  hydrALAZINE, 25 mg, Oral, Q8H  lamoTRIgine, 100 mg, Oral, Nightly  letrozole, 2.5 mg, Oral, Daily  metFORMIN ER, 1,000 mg, Oral, BID  metoprolol tartrate, 25 mg, Oral, Q12H  montelukast, 10 mg, Oral, Nightly  nystatin, 1 Application, Topical, Q12H  OLANZapine, 10 mg, Oral, Nightly  pantoprazole, 40 mg, Oral, Daily  sodium chloride, 10 mL, Intravenous, Q12H  sodium chloride, 1 g, Oral, BID With Meals  valsartan, 40 mg, Oral, Q24H          Continuous Infusions:sodium chloride, 100 mL/hr, Last Rate: 100 mL/hr (254)        PRN Meds:  acetaminophen    albuterol    ALPRAZolam    senna-docusate sodium **AND** polyethylene glycol **AND** bisacodyl **AND** bisacodyl    Calcium Replacement - Follow Nurse / BPA Driven Protocol    Magnesium Standard Dose Replacement - Follow Nurse / BPA Driven Protocol    nitroglycerin    Phosphorus Replacement - Follow Nurse / BPA Driven Protocol    Potassium Replacement - Follow Nurse / BPA Driven Protocol    sodium chloride    sodium chloride     Exam:  /56   Pulse 66   Temp 97.7 °F (36.5 °C) (Oral)   Resp 11   Ht 162.6 cm (64\")   Wt 113 kg (250 lb 3.6 oz)   SpO2 95%   BMI 42.95 kg/m²     Intake/Output last " 3 shifts:  I/O last 3 completed shifts:  In: 1320 [P.O.:1320]  Out: 3500 [Urine:3500]    Intake/Output this shift:  No intake/output data recorded.    Physical exam:  General Appearance:  Alert  Head:  Normocephalic, without obvious abnormality, atraumatic  Eyes:  PERRL, conjunctiva/corneas clear     Neck:  Supple,  no adenopathy;      Lungs:  Decreased BS occasion ronchi  Heart:  Regular rate and rhythm, S1 and S2 normal  Abdomen:  Soft, non-tender, bowel sounds active   Extremities: trace edema  Pulses: 2+ and symmetric all extremities  Skin:  No rashes or lesions       Data Review:  All labs (24hrs):   Recent Results (from the past 24 hours)   Sodium    Collection Time: 07/30/25  2:30 PM    Specimen: Blood   Result Value Ref Range    Sodium 133 (L) 136 - 145 mmol/L   Sodium    Collection Time: 07/30/25  5:33 PM    Specimen: Arm, Left; Blood   Result Value Ref Range    Sodium 128 (L) 136 - 145 mmol/L   POC Glucose Once    Collection Time: 07/30/25  8:25 PM    Specimen: Blood   Result Value Ref Range    Glucose 123 (H) 70 - 105 mg/dL   Basic Metabolic Panel    Collection Time: 07/31/25 12:57 AM    Specimen: Arm, Left; Blood   Result Value Ref Range    Glucose 157 (H) 65 - 99 mg/dL    BUN 13.6 8.0 - 23.0 mg/dL    Creatinine 0.71 0.57 - 1.00 mg/dL    Sodium 132 (L) 136 - 145 mmol/L    Potassium 3.8 3.5 - 5.2 mmol/L    Chloride 103 98 - 107 mmol/L    CO2 22.9 22.0 - 29.0 mmol/L    Calcium 7.2 (L) 8.6 - 10.5 mg/dL    BUN/Creatinine Ratio 19.2 7.0 - 25.0    Anion Gap 6.1 5.0 - 15.0 mmol/L    eGFR 93.3 >60.0 mL/min/1.73          Imaging:  CT Head Without Contrast  Result Date: 7/27/2025  Impression: No acute intracranial process. Soft tissue swelling present within the right parietal scalp. Electronically Signed: Maria Elena Del Valle MD  7/27/2025 8:51 PM EDT  Workstation ID: [id number]    XR Knee 3 View Left  Result Date: 7/27/2025  Impression: No acute osseous abnormality. Moderate osteoarthritic changes are present. No joint  effusion. Electronically Signed: Maria Elena Del Valle MD  7/27/2025 8:22 PM EDT  Workstation ID: [id number]      Assessment/Plan:     Fall    Near syncope         Hyponatremia possibly due to decreased p.o. intake with low urine sodium  Add-recheck labs later  today  Acute kidney injury with low urine sodium  Hypomagnesemia  COPD  Status post fall  Diabetes  Hypertension  Bipolar disorder        Recommendations:   July 31  Sodium improving  DC IV fluid  Reduce salt pills to once a day  IV for a week  Will see her in the office in 4 to 6 weeks      Patient is a 65-year-old female patient with multiple medical problems presenting with fall and was found to have low sodium is been getting worse over the last 36 hours  Noted urine sodium and osmolarity  Add NS at 75 cc an hour  Add salt pills  Recheck labs later today no need for 3% saline

## 2025-07-31 NOTE — DISCHARGE SUMMARY
"[facility] Medicine Services  Discharge Summary    Date of Service: 25  Patient Name: Jayda Mclean  : 1960  MRN: [id number]    Date of Admission: 2025  Discharge Diagnosis: Mechanical fall  Date of Discharge:  25  Primary Care Physician: Alice Damian MD    Presenting Problem:   Dehydration [E86.0]  Hypomagnesemia [E83.42]  Ataxia [R27.0]  Fall [W19.XXXA]  Near syncope [R55]  Multiple falls [R29.6]  Hematoma of scalp, initial encounter [S00.03XA]  Contusion of right knee, initial encounter [S80.01XA]  Anemia, unspecified type [D64.9]    Active and Resolved Hospital Problems:  Active Hospital Problems    Diagnosis POA    **Fall [W19.XXXA] Yes    Hyponatremia [E87.1] Yes    Near syncope [R55] Yes      Resolved Hospital Problems   No resolved problems to display.     Generalized weakness  Mechanical fall  Osteoarthritis of bilateral lower extremities  Hyponatremia  Hypomagnesemia  COPD  Hypertension  Type 2 diabetes mellitus  Bipolar disorder  Depression   PTSD    Hospital Course     HPI:  Per the H&P written by Jeison Mcelroy, dated 25:  \"Jayda Mclean is a 65 y.o. female with a CMH of DCIS right breast, COPD, HTN, DM type II, bipolar disorder/PTSD, chronic back pain who presented to [facility] on 2025 with Fall   Fall  Incident onset: multiple times today at least 4. The fall occurred while standing and while walking. She landed on Hard floor. There was no blood loss. The point of impact was the head. The pain is present in the head and left knee. The pain is at a severity of 4/10. The pain is mild. The symptoms are aggravated by ambulation. Pertinent negatives include no abdominal pain, fever, headaches, nausea or vomiting.      Patient recently admitted for similar symptoms of generalized weakness and fall of which was recommended for rehabilitation previous admission.  Today and falls were purely mechanical as patient states that she lost her " "balance.  Denies any concerning symptoms of lightheadedness, dizziness, chest pain, palpitation, nausea, vomiting, bowel movement, vasovagal like symptoms.  Denies any loss of consciousness.  Denies any seizure-like activity.  - Patient does admit to having history of osteoarthritis of bilateral knees.  Trying to lose weight at this time with decreased p.o. intake.\"    Hospital Course:  Patient admitted as above. Electrolytes repleated. Noted to be hyponatremia which worsened during admission. Nephrology consulted. Patient treated with IV fluids and salt tabs with improvement. Noted to have chronic knee pain with plans to follow up with orthopedic surgery for which she is established outpatient.     DISCHARGE Follow Up Recommendations for labs and diagnostics:   - Follow up with PCP within 3 days of discharge  - Follow up with orthopedic surgery as scheduled  - Recommend repeat labs including bmp and magnesium to monitor electrolytes at outpatient follow up with PCP    Day of Discharge     Vital Signs:  Temp:  [97.7 °F (36.5 °C)-98 °F (36.7 °C)] 98 °F (36.7 °C)  Heart Rate:  [63-72] 68  Resp:  [9-16] 12  BP: (145-189)/(52-76) 160/58    Physical Exam:   General: No acute distress, alert and oriented  CV: RRR, + peripheral edema  Pulm: CTAB, no increased work of breathing  Abd: Soft, nontender, nondistended  Skin: No rashes or lesions on exposed skin  Psych: Appropriate mood and affect    Pertinent  and/or Most Recent Results     LAB RESULTS:      Lab 07/29/25  2313 07/29/25  0515 07/28/25  0115 07/27/25 1956   WBC 7.42 5.80 8.33 9.07   HEMOGLOBIN 9.1* 8.9* 10.7* 10.6*   HEMATOCRIT 27.6* 28.1* 32.5* 32.3*   PLATELETS 376 384 408 418   NEUTROS ABS 3.83 3.36 5.78 6.73   IMMATURE GRANS (ABS) 0.03 0.03 0.03 0.04   LYMPHS ABS 2.45 1.50 1.61 1.35   MONOS ABS 0.42 0.40 0.50 0.59   EOS ABS 0.64* 0.45* 0.38 0.33   MCV 84.4 86.2 83.5 83.7         Lab 07/31/25  0803 07/31/25  0057 07/30/25  1733 07/30/25  1430 07/30/25  0604 " 07/29/25  2313 07/29/25  1738 07/29/25  1141 07/29/25  0515 07/28/25  0115 07/27/25 1956   SODIUM 142 132* 128* 133* 126* 123* 119* 123*  123* 124*   < > 123*   POTASSIUM  --  3.8  --   --   --  4.3 4.7 4.7 4.4   < > 3.9   CHLORIDE  --  103  --   --   --  91* 86* 88* 89*   < > 86*   CO2  --  22.9  --   --   --  23.9 24.8 23.8 25.6   < > 23.4   ANION GAP  --  6.1  --   --   --  8.1 8.2 11.2 9.4   < > 13.6   BUN  --  13.6  --   --   --  17.9 17.7 17.5 18.2   < > 12.3   CREATININE  --  0.71  --   --   --  0.75 0.90 1.02* 1.16*   < > 0.79   EGFR  --  93.3  --   --   --  87.4 70.2 60.4 51.8*   < > 82.1   GLUCOSE  --  157*  --   --   --  110* 110* 80 98   < > 135*   CALCIUM  --  7.2*  --   --   --  8.5* 8.8 9.0 8.7   < > 9.4   MAGNESIUM  --   --   --   --  1.9 1.4*  --   --   --   --  1.3*   TSH  --   --   --   --   --   --   --   --   --   --  1.400    < > = values in this interval not displayed.         Lab 07/27/25 1956   TOTAL PROTEIN 6.3   ALBUMIN 4.0   GLOBULIN 2.3   ALT (SGPT) 17   AST (SGOT) 17   BILIRUBIN 0.3   ALK PHOS 79         Lab 07/27/25 2122 07/27/25 1956   HSTROP T 18* 18*                 Brief Urine Lab Results  (Last result in the past 365 days)        Color   Clarity   Blood   Leuk Est   Nitrite   Protein   CREAT   Urine HCG        07/27/25 1957 Yellow   Clear   Negative   Trace   Negative   100 mg/dL (2+)                 Microbiology Results (last 10 days)       ** No results found for the last 240 hours. **          CT Head Without Contrast  Result Date: 7/27/2025  Impression: Impression: No acute intracranial process. Soft tissue swelling present within the right parietal scalp. Electronically Signed: Maria Elena Del Valle MD  7/27/2025 8:51 PM EDT  Workstation ID: [id number]    XR Knee 3 View Left  Result Date: 7/27/2025  Impression: Impression: No acute osseous abnormality. Moderate osteoarthritic changes are present. No joint effusion. Electronically Signed: Maria Elena Del Valle MD  7/27/2025 8:22 PM EDT   Workstation ID: [id number]    Results for orders placed during the hospital encounter of 06/10/25    Duplex Venous Lower Extremity - Bilateral CAR 06/11/2025  4:05 PM    Interpretation Summary    Normal bilateral lower extremity venous duplex scan.    Results for orders placed during the hospital encounter of 06/10/25    Duplex Venous Lower Extremity - Bilateral CAR 06/11/2025  4:05 PM    Interpretation Summary    Normal bilateral lower extremity venous duplex scan.    Results for orders placed during the hospital encounter of 06/10/25    Adult Transthoracic Echo Complete W/ Cont if Necessary Per Protocol 06/11/2025 12:58 PM    Interpretation Summary    Left ventricular systolic function is normal. Left ventricular ejection fraction appears to be 61 - 65%.    Left ventricular diastolic function is consistent with (grade II w/high LAP) pseudonormalization.    The left atrial cavity is mildly dilated.    The right atrial cavity is severely dilated.    Moderate aortic valve stenosis is present.    Peak velocity of the flow distal to the aortic valve is 288.3 cm/s. Aortic valve maximum pressure gradient is 33 mmHg. Aortic valve mean pressure gradient is 21 mmHg.    There is mild, bileaflet mitral valve thickening present.      Labs Pending at Discharge:   Pending Results       None            Procedures Performed   None       Consults:   Consults       No orders found from 6/28/2025 to 7/28/2025.            Discharge Details        Discharge Medications        New Medications        Instructions Start Date   sodium chloride 1 g tablet   1 g, Oral, Daily   Start Date: August 1, 2025            Continue These Medications        Instructions Start Date   ALPRAZolam 2 MG tablet  Commonly known as: XANAX   2 mg, 3 Times Daily PRN      aspirin 81 MG EC tablet   Take 1 tablet by mouth Daily.      atorvastatin 80 MG tablet  Commonly known as: LIPITOR   80 mg, Daily      Calcium + Vitamin D3 600-10 MG-MCG tablet per  tablet  Generic drug: calcium carb-cholecalciferol   1 tablet, Daily      candesartan 8 MG tablet  Commonly known as: ATACAND   8 mg, Daily      cetirizine 10 MG tablet  Commonly known as: zyrTEC   10 mg, Daily PRN      dicyclomine 10 MG capsule  Commonly known as: BENTYL   Take 1 capsule by mouth 4 (Four) Times a Day As Needed.      docusate sodium 100 MG capsule  Commonly known as: COLACE   100 mg, 2 Times Daily PRN      DULoxetine 60 MG capsule  Commonly known as: CYMBALTA   60 mg, Nightly      econazole nitrate 1 % cream  Commonly known as: SPECTAZOLE   1 Application, Daily PRN      fenofibrate 48 MG tablet  Commonly known as: TRICOR   Take 1 tablet by mouth Daily.      fluticasone 50 MCG/ACT nasal spray  Commonly known as: FLONASE   Administer 1 spray into the nostril(s) as directed by provider Daily.      FREESTYLE LITE test strip  Generic drug: glucose blood   USE 1 STRIP DAILY AND PRN      gabapentin 400 MG capsule  Commonly known as: NEURONTIN   400 mg, 3 Times Daily      hydrALAZINE 25 MG tablet  Commonly known as: APRESOLINE   25 mg, Oral, Every 8 Hours Scheduled      lamoTRIgine 100 MG tablet  Commonly known as: LaMICtal   100 mg, Nightly      letrozole 2.5 MG tablet  Commonly known as: FEMARA   2.5 mg, Daily      metFORMIN  MG 24 hr tablet  Commonly known as: GLUCOPHAGE-XR   Take 2 tablets by mouth 2 (Two) Times a Day.      metoprolol tartrate 25 MG tablet  Commonly known as: LOPRESSOR   25 mg, Oral, Every 12 Hours Scheduled      montelukast 10 MG tablet  Commonly known as: SINGULAIR   10 mg, Nightly      OLANZapine 10 MG tablet  Commonly known as: zyPREXA   10 mg, Nightly      pantoprazole 40 MG EC tablet  Commonly known as: PROTONIX   Take 1 tablet by mouth Daily.      ProAir  (90 Base) MCG/ACT inhaler  Generic drug: albuterol sulfate HFA   Inhale 1 puff Every 4 (Four) Hours As Needed.      albuterol (2.5 MG/3ML) 0.083% nebulizer solution  Commonly known as: PROVENTIL   2.5 mg, Every 4  Hours PRN      Wixela Inhub 250-50 MCG/ACT DISKUS  Generic drug: Fluticasone-Salmeterol   1 puff, 2 Times Daily - RT               Allergies   Allergen Reactions    Lithium Hives    Venlafaxine Hcl Er Hives    Latuda [Lurasidone] Hives    Vraylar [Cariprazine Hcl] Dizziness       Discharge Disposition:   Home    Discharge Activity:   As tolerated    CODE STATUS:  Code Status and Medical Interventions: CPR (Attempt to Resuscitate); Full Support   Ordered at: 07/27/25 2224     Code Status (Patient has no pulse and is not breathing):    CPR (Attempt to Resuscitate)     Medical Interventions (Patient has pulse or is breathing):    Full Support     Level Of Support Discussed With:    Patient       No future appointments.    Additional Instructions for the Follow-ups that You Need to Schedule       Ambulatory Referral to Home Health   As directed      Face to Face Visit Date: 7/31/2025   Follow-up provider for Plan of Care?: I treated the patient in an acute care facility and will not continue treatment after discharge.   Follow-up provider: ALICE MORRIS [[id number]]   Reason/Clinical Findings: ataxia, falls, osteoarthritis   Describe mobility limitations that make leaving home difficult: physical deconditioning, assist device with ambulation   Nursing/Therapeutic Services Requested: Physical Therapy Occupational Therapy   PT orders: Therapeutic exercise Gait Training Strengthening Home safety assessment   Weight Bearing Status: As Tolerated   Occupational orders: Activities of daily living Strengthening   Frequency: 1 Week 1        Discharge Follow-up with PCP   As directed       Currently Documented PCP:    Alice Morris MD    PCP Phone Number:    [phone]     Follow Up Details: Follow-up with PCP within 3 days of discharge        Discharge Follow-up with Specified Provider: Orthopedic surgery; 2 Weeks   As directed      To: Orthopedic surgery   Follow Up: 2 Weeks                Time spent on Discharge  including face to face service:  >30 minutes    Signature: Electronically signed by Mago Moralez MD, 07/31/25, 18:49 EDT.  [facility] Hospitalist Team

## 2025-07-31 NOTE — THERAPY TREATMENT NOTE
"Subjective: Pt agreeable to therapeutic plan of care.    Objective:     Bed mobility - Supine to sitting Min A    Transfers - STS from EOB Min A using RW  STS From commode SBA using handrails.     Ambulation - 2x30 feet CGA using RW.     ADLs - Min A for brief management. Setup assistance for pericare.     Vitals: WNL    Pain: 5 VAS Location: B knees, L>R  Intervention for pain: Repositioned, Increased Activity, and Therapeutic Presence    Education: Provided education on the importance of mobility in the acute care setting, Verbal/Tactile Cues, Transfer Training, and Gait Training    Assessment: Jayda Mclean presents with functional mobility impairments which indicate the need for skilled intervention. Tolerating session today without incident. Pt required slightly more assistance with functional tasks this date. States she feels more \"stiff\". Will continue to follow and progress as tolerated.     Plan/Recommendations:   If medically appropriate, Low Intensity Therapy recommended post-acute care - This is recommended as therapy feels this patient would require 2-3 visits per week. OP or HH would be the best option depending on patient's home bound status. Consider, if the patient has other  \"skilled\" needs such as wounds, IV antibiotics, etc. Combined with \"low intensity\" could also equate to a SNF. If patient is medically complex, consider LTAC. Pt requires no DME at discharge.     Pt desires Home with Home Health at discharge. Pt cooperative; agreeable to therapeutic recommendations and plan of care.         Basic Mobility 6-click:  Rollin = Total, A lot = 2, A little = 3; 4 = None  Supine>Sit:   1 = Total, A lot = 2, A little = 3; 4 = None   Sit>Stand with arms:  1 = Total, A lot = 2, A little = 3; 4 = None  Bed>Chair:   1 = Total, A lot = 2, A little = 3; 4 = None  Ambulate in room:  1 = Total, A lot = 2, A little = 3; 4 = None  3-5 Steps with railin = Total, A lot = 2, A little = 3; 4 = " None  Score: 21    Modified [place]: N/A = No pre-op stroke/TIA    Post-Tx Position: Up in Chair, Alarms activated, and Call light and personal items within reach  PPE: gloves    Therapy Charges for Today       Code Description Service Date Service Provider Modifiers Qty    27925998328 HC PT THERAPEUTIC ACT EA 15 MIN 7/30/2025 Billy Card, PTA GP 1    44457171414 HC GAIT TRAINING EA 15 MIN 7/30/2025 Billy Card, PTA GP 1    27037842167 HC PT THER PROC EA 15 MIN 7/30/2025 Billy Card, PTA GP 1    93504933112 HC GAIT TRAINING EA 15 MIN 7/31/2025 Billy Card, PTA GP 1    11006710288 HC PT THERAPEUTIC ACT EA 15 MIN 7/31/2025 Billy Card, GHAZAL GP 1    06104257684 HC PT SELF CARE/MGMT/TRAIN EA 15 MIN 7/31/2025 Billy Card, PTA GP 1           PT Charges       Row Name 07/31/25 1053             Time Calculation    Start Time 0943  -UN      Stop Time 1007  -UN      Time Calculation (min) 24 min  -UN      PT Received On 07/31/25  -UN      PT - Next Appointment 08/01/25  -UN         Time Calculation- PT    Total Timed Code Minutes- PT 24 minute(s)  -UN                User Key  (r) = Recorded By, (t) = Taken By, (c) = Cosigned By      Initials Name Provider Type    Billy Velez PTA Physical Therapist Assistant

## 2025-07-31 NOTE — PLAN OF CARE
"Assessment: Jayda Mclean presents with functional mobility impairments which indicate the need for skilled intervention. Tolerating session today without incident. Pt required slightly more assistance with functional tasks this date. States she feels more \"stiff\". Will continue to follow and progress as tolerated.     Plan/Recommendations:   If medically appropriate, Low Intensity Therapy recommended post-acute care - This is recommended as therapy feels this patient would require 2-3 visits per week. OP or HH would be the best option depending on patient's home bound status. Consider, if the patient has other  \"skilled\" needs such as wounds, IV antibiotics, etc. Combined with \"low intensity\" could also equate to a SNF. If patient is medically complex, consider LTAC. Pt requires no DME at discharge.     Pt desires Home with Home Health at discharge. Pt cooperative; agreeable to therapeutic recommendations and plan of care.     "

## 2025-07-31 NOTE — OUTREACH NOTE
Prep Survey      Flowsheet Row Responses   [facility] facility patient discharged from? [facility]   Is LACE score < 7 ? No   Eligibility Readm Mgmt   Discharge diagnosis Fall   Does the patient have one of the following disease processes/diagnoses(primary or secondary)? Other   Does the patient have Home health ordered? Yes   What is the Home health agency?  [facility]   Is there a DME ordered? No   Prep survey completed? Yes            ANEUDY DELGADILLO - Registered Nurse

## 2025-07-31 NOTE — PROGRESS NOTES
Start PACC Note    Home Health Referral    Evaluated patient on Home Care and services available. Patient offered choice of available HHC and agreeable to PT/OT services with [facility].    Isolation Precautions: No active isolations    START PATIENT REGISTRATION INFORMATION  Order Information  Order Signing Physician: No att. providers found  Service Ordered RN?: No  Service Ordered PT?: Yes  Service Ordered OT?: Yes  Service Ordered ST?: No  Service Ordered MSW?: No  Service Ordered HHA?: No  Following Physician: Alice Damian MD  Following Physician Phone: [phone]  Overseeing Physician: Alice Damian MD  (Required for Residents Only)  Agreeable to Follow ? Yes  Date/Time of Call 07/31/25 14:58 EDT, Spoke with: per md order    Care Coordination  Same Day SOC?: No  Primary Care Physician: Alice Damian MD  Primary Care Physician Phone: [phone]  Primary Care Physician Address: [address] [place] [address] [place] [facility]  Visit Instructions: N/A  Service Discharge Location Type: Home  Service Facility Name: N/A  Service Floor Facility: N/A  Service Room No: N/A        Demographics  Patient Last Name: Vinay  Patient First Name: Jayda  Language/Communication Barrier: none  Service Address: [address] [place] [address] [address]  Service City: [place]  Service State: [place]  Service Zip: [address]  [facility] Home Phone: [phone]  Other Phone Numbers:   Telephone Information:   Mobile [phone]     Emergency Contact:   Extended Emergency Contact Information  Primary Emergency Contact: JONNIE CORTÉS  Address: [address] [place] [address] [address]           [address] [place]  Home Phone: [phone]  Mobile Phone: [phone]  Relation: Brother   needed? No  Secondary Emergency Contact: Melanie Mclean  Mobile Phone: [phone]  Relation: Daughter    Admission Information  Admit Date: 7/27/2025  Patient Status at Discharge: Inpatient  Admitting  Diagnosis: Dehydration [E86.0]  Hypomagnesemia [E83.42]  Ataxia [R27.0]  Fall [W19.XXXA]  Near syncope [R55]  Multiple falls [R29.6]  Hematoma of scalp, initial encounter [S00.03XA]  Contusion of right knee, initial encounter [S80.01XA]  Anemia, unspecified type [D64.9]    Caregiver Information  Caregiver First Name:   Caregiver Last Name:   Caregiver Relationship to Patient:   Caregiver Phone Number:   Caregiver Notes:     [employer]  [employer]  No      END PATIENT REGISTRATION INFORMATION        Start PACC Summary    Additional Comments: none    END PACC Summary    Discharge Date: Pending    Referral Source: MIRIAM Quintana    Signed By: Erika Kuo RN, 7/31/2025, 14:58 EDT     Date/Time: 07/31/25 14:58 EDT    End PACC Note

## 2025-07-31 NOTE — DISCHARGE PLACEMENT REQUEST
"Jayda Mclean (65 y.o. Female)       Date of Birth   1960    Social Security Number       Address   [address] [facility] [place] [address]    Home Phone   [phone]    MRN   [id number]       [employer]   Other    Marital Status                               Admission Date   7/27/2025    Admission Type   Emergency    Admitting Provider   Jeison Mcelroy MD    Attending Provider   Mago Moralez MD    Department, Room/Bed   [facility] SURGICAL INPATIENT, 4123/1       Discharge Date       Discharge Disposition   Home or Self Care    Discharge Destination                                 Attending Provider: Mago Moralez MD    Allergies: Lithium, Venlafaxine Hcl Er, Latuda [Lurasidone], Vraylar [Cariprazine Hcl]    Isolation: None   Infection: None   Code Status: CPR    Ht: 162.6 cm (64\")   Wt: 113 kg (250 lb 3.6 oz)    Admission Cmt: None   Principal Problem: Fall [W19.XXXA]                   Active Insurance as of 7/27/2025       Primary Coverage       Payor Plan Insurance Group Employer/Plan Group    ANTHEM MEDICARE REPLACEMENT ANTHEM MEDICARE ADVANTAGE [id number]       Payor Plan Address Payor Plan Phone Number Payor Plan Fax Number Effective Dates    [address] [phone]  3/1/2025 - None Entered    [place] [phone]         Subscriber Name Subscriber Birth Date Member ID       JAYDA MCLEAN 1960 [id number]               Secondary Coverage       Payor Plan Insurance Group Employer/Plan Group    [place] [id number]       Payor Plan Address Payor Plan Phone Number Payor Plan Fax Number Effective Dates    [phone]   7/1/2024 - None Entered    [facility] [id number]         Subscriber Name Subscriber Birth Date Member ID       JAYDA MCLEAN 1960 [id number]                     Emergency Contacts        (Rel.) Home Phone Work Phone Mobile Phone    JONNIE CORTÉS (Brother) [phone] -- [phone] "    Melanie Mclean (Daughter) -- -- [phone]

## 2025-07-31 NOTE — CASE MANAGEMENT/SOCIAL WORK
Continued Stay Note  [facility]     Patient Name: Jayda Mclean  MRN: [id number]  Today's Date: 7/31/2025    Admit Date: 7/27/2025    Plan: DC Plan: Home  Referral to [facility] pending acceptance   Discharge Plan       Row Name 07/31/25 1203       Plan    Plan DC Plan: Home  Referral to [facility] pending acceptance    Plan Comments Dc barriers: pending acceptance with Home health  , referral to [facility]                 Expected Discharge Date and Time       Expected Discharge Date Expected Discharge Time    Jul 31, 2025             Senait Acosta RN    SIPS 1  [email or web address]  Office [phone]  Cell [phone]

## 2025-08-01 ENCOUNTER — READMISSION MANAGEMENT (OUTPATIENT)
Dept: CALL CENTER | Facility: HOSPITAL | Age: 65
End: 2025-08-01
Payer: MEDICARE

## 2025-08-01 NOTE — OUTREACH NOTE
Medical Week 1 Survey      Flowsheet Row Responses   [facility] patient discharged from? [facility]   Does the patient have one of the following disease processes/diagnoses(primary or secondary)? Other   Week 1 attempt successful? Yes   Call start time 1636   Call end time 1644   Discharge diagnosis Fall   Meds reviewed with patient/caregiver? Yes   Does the patient have all medications ordered at discharge? No   What is keeping the patient from filling the prescriptions? Transportation   Nursing Interventions Nurse called pharmacy   Prescription comments Pt states she can't get her caregiver to get medication til Monday.  Called [facility] and they do not deliver and only do postal service. She states she will just have caregiver  on Monday.   Does the patient have a primary care provider?  Yes   Does the patient have an appointment with their PCP within 7 days of discharge? Yes   Has the patient kept scheduled appointments due by today? N/A   What is the Home health agency?  [facility]   Has home health visited the patient within 72 hours of discharge? Yes   Psychosocial issues? No   Did the patient receive a copy of their discharge instructions? Yes   Nursing interventions Reviewed instructions with patient   What is the patient's perception of their health status since discharge? Improving   Is the patient/caregiver able to teach back signs and symptoms related to disease process for when to call PCP? Yes   Is the patient/caregiver able to teach back signs and symptoms related to disease process for when to call 911? Yes   Is the patient/caregiver able to teach back the hierarchy of who to call/visit for symptoms/problems? PCP, Specialist, Home health nurse, Urgent Care, ED, 911 Yes   Week 1 call completed? Yes   Call end time 1644            Rachna Ziegler Licensed Nurse

## 2025-08-01 NOTE — CASE MANAGEMENT/SOCIAL WORK
Case Management Discharge Note      Final Note: HOME WITH [facility]    Provided Post Acute Provider List?: Yes  Post Acute Provider List: Nursing Home  Delivered To: Patient  Method of Delivery: In person    Selected Continued Care - Discharged on 7/31/2025 Admission date: 7/27/2025 - Discharge disposition: Home or Self Care          Home Medical Care Coordination complete.      Service Provider Services Address Phone Fax Patient Preferred    [facility] [address] [facility] [address] [phone] --                    Transportation Services  Transportation: Private Transportation  Private: Car    Final Discharge Disposition Code: 06 - home with home health care